# Patient Record
Sex: MALE | Race: BLACK OR AFRICAN AMERICAN | NOT HISPANIC OR LATINO | Employment: FULL TIME | URBAN - METROPOLITAN AREA
[De-identification: names, ages, dates, MRNs, and addresses within clinical notes are randomized per-mention and may not be internally consistent; named-entity substitution may affect disease eponyms.]

---

## 2017-02-14 ENCOUNTER — TRANSFERRED RECORDS (OUTPATIENT)
Dept: HEALTH INFORMATION MANAGEMENT | Facility: CLINIC | Age: 57
End: 2017-02-14

## 2017-02-27 ENCOUNTER — TRANSFERRED RECORDS (OUTPATIENT)
Dept: HEALTH INFORMATION MANAGEMENT | Facility: CLINIC | Age: 57
End: 2017-02-27

## 2017-02-27 ENCOUNTER — MEDICAL CORRESPONDENCE (OUTPATIENT)
Dept: HEALTH INFORMATION MANAGEMENT | Facility: CLINIC | Age: 57
End: 2017-02-27

## 2017-02-28 ENCOUNTER — PRE VISIT (OUTPATIENT)
Dept: ORTHOPEDICS | Facility: CLINIC | Age: 57
End: 2017-02-28

## 2017-02-28 ASSESSMENT — ENCOUNTER SYMPTOMS
MUSCLE WEAKNESS: 0
JOINT SWELLING: 0
STIFFNESS: 0
MYALGIAS: 0
MUSCLE CRAMPS: 0
BACK PAIN: 1
ARTHRALGIAS: 1
NECK PAIN: 0

## 2017-02-28 NOTE — TELEPHONE ENCOUNTER
1.  Date/reason for appt: 3/7/17 left ankle chip fracture   2.  Referring provider: CAIO HUERTA    3.  Call to patient (Yes / No - short description): no, referral   4.  Previous care at / records requested from: [facility]   5.  Other: Faxed requests.

## 2017-03-06 ENCOUNTER — MEDICAL CORRESPONDENCE (OUTPATIENT)
Dept: HEALTH INFORMATION MANAGEMENT | Facility: CLINIC | Age: 57
End: 2017-03-06

## 2017-03-07 ENCOUNTER — OFFICE VISIT (OUTPATIENT)
Dept: ORTHOPEDICS | Facility: CLINIC | Age: 57
End: 2017-03-07

## 2017-03-07 VITALS — WEIGHT: 214.6 LBS | BODY MASS INDEX: 29.07 KG/M2 | HEIGHT: 72 IN

## 2017-03-07 DIAGNOSIS — M19.072 ARTHRITIS OF LEFT ANKLE: Primary | ICD-10-CM

## 2017-03-07 NOTE — MR AVS SNAPSHOT
After Visit Summary   3/7/2017    Anderson Mitchell    MRN: [id number]           Patient Information     Date Of Birth          1960        Visit Information        Provider Department      3/7/2017 10:50 AM Ayush Hamilton MD [facility]        Today's Diagnoses     Arthritis of left ankle    -  1       Follow-ups after your visit        Your next 10 appointments already scheduled     Apr 04, 2017  8:30 AM CDT   XR INJECTION with UCXR3,  IMAGING NURSE, [facility] RAD   [facility] ([facility])    [address] [address] [phone]           Stop drinking 1 hour before the exam.  You may take your medicines as usual, except for blood thinners (Coumadin, Plavix, Ticlid, Persantine, Aggrenox, Pletal, Effient, Brilliant). Talk to your doctor if you take these.  Tell your doctor if:   You have ever had an allergic reaction to X-ray dye (contrast fluid).   There is a chance you may be pregnant.  Please bring a list of your current medicines to your exam. Include vitamins, minerals and over-the-counter medicines.  Please call the Imaging Department at your exam site with any questions.              Future tests that were ordered for you today     Open Future Orders        Priority Expected Expires Ordered    XR Joint Injection Intermed Left Routine 3/7/2017 3/7/2018 3/7/2017            Who to contact     Please call your clinic at [phone] to:    Ask questions about your health    Make or cancel appointments    Discuss your medicines    Learn about your test results    Speak to your doctor   If you have compliments or concerns about an experience at your clinic, or if you wish to file a complaint, please contact [facility] Patient Relations at [phone] or email us at [email or web address]         Additional Information About Your Visit        MyChart  "Information     [employer] gives you secure access to your electronic health record. If you see a primary care provider, you can also send messages to your care team and make appointments. If you have questions, please call your primary care clinic.  If you do not have a primary care provider, please call [phone] and they will assist you.      Zen99 is an electronic gateway that provides easy, online access to your medical records. With [employer], you can request a clinic appointment, read your test results, renew a prescription or communicate with your care team.     To access your existing account, please contact your [facility] or call [phone] for assistance.        Care EveryWhere ID     This is your Care EveryWhere ID. This could be used by other organizations to access your [place] medical records  [id number]        Your Vitals Were     Height BMI (Body Mass Index)                1.84 m (6' 0.44\") 28.75 kg/m2           Blood Pressure from Last 3 Encounters:   05/13/14 (!) 133/96   05/07/13 (!) 142/91   05/08/12 143/85    Weight from Last 3 Encounters:   03/07/17 97.3 kg (214 lb 9.6 oz)   05/13/14 102.5 kg (226 lb)   05/23/13 104.1 kg (229 lb 6.4 oz)               Primary Care Provider    Duane M Koons       No address on file        Thank you!     Thank you for choosing [facility]  for your care. Our goal is always to provide you with excellent care. Hearing back from our patients is one way we can continue to improve our services. Please take a few minutes to complete the written survey that you may receive in the mail after your visit with us. Thank you!             Your Updated Medication List - Protect others around you: Learn how to safely use, store and throw away your medicines at www.disposemymeds.org.          This list is accurate as of: 3/7/17 11:05 AM.  Always use your most recent med list.                   Brand Name Dispense Instructions " for use    ACIPHEX 20 MG EC tablet   Generic drug:  RABEprazole      Take 3 tablets by mouth daily.       FEXOFENADINE HCL PO      Take 1 tablet by mouth daily.       FLONASE 50 MCG/ACT spray   Generic drug:  fluticasone      2 sprays by Both Nostrils route daily.       hydrochlorothiazide 25 MG tablet    HYDRODIURIL     Take 1 tablet by mouth daily.       multivitamin per tablet      Take 1 tablet by mouth daily.       VERAPAMIL HCL PO

## 2017-03-07 NOTE — LETTER
3/7/2017       RE: Anderson Mitchell  [address]  [place] [address]     Dear Colleague,    Thank you for referring your patient, Anderson Mitchell, to the [facility] at [facility]. Please see a copy of my visit note below.    CHIEF COMPLAINT:  Left foot and ankle pain secondary to an injury sustained in 2016.      HISTORY OF PRESENT ILLNESS:  Mr. Mitchell is 56-year-old man who presents today for evaluation of his left ankle area.  The patient reports to have stepped off a surface while being at work.  The patient did not think much of it.  He did not give it any serious importance until later on when he was evaluated by a  and he was told it was just an ankle sprain.      Since then, the patient reports to have problems and difficulties with the left ankle.  Reports to have problems, especially with walking on uneven terrain.  The patient has not had any formal treatment for this.      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      The patient reports also to be very heavily involved in physical activities and to have been working on his weight control and reports to have lost 28 pounds in the past year as a way to try to avoid taking any blood pressure medications.      PAST MEDICAL HISTORY:  Hypertension, history of thalassemia.      PAST SURGICAL HISTORY:  Colonoscopy in 2012 as well as  abdominal surgery.      DRUG ALLERGIES:  None.      MEDICATIONS:  Please refer to encounter form.      PHYSICAL EXAMINATION:  On today's visit, he presents as a pleasant male in no apparent distress with a height of 6 feet and a weight of 214 pounds.  Denies to have any constitutional symptoms.      On today's visit, he presents with full range of motion of the left ankle, hindfoot and midfoot joints.  However, there is some stiffness  for inversion and eversion.  This is somewhat painful to him, especially along the sinus tarsi.  The posterior process of the subtalar joint is also painful.      The anteromedial aspect of the ankle joint is quite painful to the touch.  There is no effusion.  Forefoot exam is grossly unremarkable.      IMAGING:  CT scan from early 2017 was reviewed today which was significant for showing a fair amount of osteoarthritis across the subtalar joint with an ossicle located along the lateral gutter of the subtalar and ankle joint.  He presents with overall a very healthy ankle joint except for the presence of some osteophytes located along the most anteromedial portion.      ASSESSMENT:     1.  Left ankle impingement.   2.  Left subtalar joint osteoarthritis.      PLAN:  I discussed with the patient the natural history of his condition.  For the time being, given the fact that he has not tried any type of treatment, we are going to recommend to proceed with the use of a corticosteroid injection into his subtalar joint.  If this is not successful in improving his discomfort, we can always consider the possibility of a subtalar joint arthrodesis with lateral gutter debridement.      I discussed with him the most likely postoperative course and complications from such intervention.      All questions were answered.  Patient was pleased with the discussion.  The patient would like to proceed with the nonsurgical route for the time being and he will contact us if any problems arise.      All questions were answered.      TT 30 minutes, CT 20 minutes.           Again, thank you for allowing me to participate in the care of your patient.      Sincerely,    Ayush Hamilton MD

## 2017-03-07 NOTE — PROGRESS NOTES
CHIEF COMPLAINT:  Left foot and ankle pain secondary to an injury sustained in 2016.      HISTORY OF PRESENT ILLNESS:  Mr. Mitchell is 56-year-old man who presents today for evaluation of his left ankle area.  The patient reports to have stepped off a surface while being at work.  The patient did not think much of it.  He did not give it any serious importance until later on when he was evaluated by a  and he was told it was just an ankle sprain.      Since then, the patient reports to have problems and difficulties with the left ankle.  Reports to have problems, especially with walking on uneven terrain.  The patient has not had any formal treatment for this.      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      The patient reports also to be very heavily involved in physical activities and to have been working on his weight control and reports to have lost 28 pounds in the past year as a way to try to avoid taking any blood pressure medications.      PAST MEDICAL HISTORY:  Hypertension, history of thalassemia.      PAST SURGICAL HISTORY:  Colonoscopy in 2012 as well as  abdominal surgery.      DRUG ALLERGIES:  None.      MEDICATIONS:  Please refer to encounter form.      PHYSICAL EXAMINATION:  On today's visit, he presents as a pleasant male in no apparent distress with a height of 6 feet and a weight of 214 pounds.  Denies to have any constitutional symptoms.      On today's visit, he presents with full range of motion of the left ankle, hindfoot and midfoot joints.  However, there is some stiffness for inversion and eversion.  This is somewhat painful to him, especially along the sinus tarsi.  The posterior process of the subtalar joint is also painful.      The anteromedial aspect of the ankle joint is quite painful to the touch.  There is no effusion.  Forefoot exam is grossly unremarkable.      IMAGING:   CT scan from early 2017 was reviewed today which was significant for showing a fair amount of osteoarthritis across the subtalar joint with an ossicle located along the lateral gutter of the subtalar and ankle joint.  He presents with overall a very healthy ankle joint except for the presence of some osteophytes located along the most anteromedial portion.      ASSESSMENT:     1.  Left ankle impingement.   2.  Left subtalar joint osteoarthritis.      PLAN:  I discussed with the patient the natural history of his condition.  For the time being, given the fact that he has not tried any type of treatment, we are going to recommend to proceed with the use of a corticosteroid injection into his subtalar joint.  If this is not successful in improving his discomfort, we can always consider the possibility of a subtalar joint arthrodesis with lateral gutter debridement.      I discussed with him the most likely postoperative course and complications from such intervention.      All questions were answered.  Patient was pleased with the discussion.  The patient would like to proceed with the nonsurgical route for the time being and he will contact us if any problems arise.      All questions were answered.      TT 30 minutes, CT 20 minutes.       Answers for HPI/ROS submitted by the patient on 2/28/2017   General Symptoms: Yes  Skin Symptoms: No  HENT Symptoms: No  EYE SYMPTOMS: No  HEART SYMPTOMS: No  LUNG SYMPTOMS: No  INTESTINAL SYMPTOMS: No  URINARY SYMPTOMS: No  REPRODUCTIVE SYMPTOMS: No  SKELETAL SYMPTOMS: Yes  BLOOD SYMPTOMS: No  NERVOUS SYSTEM SYMPTOMS: No  MENTAL HEALTH SYMPTOMS: No  Back pain: Yes  Muscle aches: No  Neck pain: No  Swollen joints: No  Joint pain: Yes  Bone pain: Yes  Muscle cramps: No  Muscle weakness: No  Joint stiffness: No  Bone fracture: Yes

## 2017-12-13 DIAGNOSIS — M19.072 ARTHRITIS OF LEFT ANKLE: Primary | ICD-10-CM

## 2017-12-19 ENCOUNTER — RADIANT APPOINTMENT (OUTPATIENT)
Dept: GENERAL RADIOLOGY | Facility: CLINIC | Age: 57
End: 2017-12-19
Attending: ORTHOPAEDIC SURGERY
Payer: COMMERCIAL

## 2017-12-19 DIAGNOSIS — M19.072 ARTHRITIS OF LEFT ANKLE: ICD-10-CM

## 2017-12-19 RX ORDER — BUPIVACAINE HYDROCHLORIDE 2.5 MG/ML
10 INJECTION, SOLUTION EPIDURAL; INFILTRATION; INTRACAUDAL ONCE
Status: COMPLETED | OUTPATIENT
Start: 2017-12-19 | End: 2017-12-19

## 2017-12-19 RX ORDER — IOPAMIDOL 408 MG/ML
10 INJECTION, SOLUTION INTRATHECAL ONCE
Status: COMPLETED | OUTPATIENT
Start: 2017-12-19 | End: 2017-12-19

## 2017-12-19 RX ORDER — TRIAMCINOLONE ACETONIDE 40 MG/ML
40 INJECTION, SUSPENSION INTRA-ARTICULAR; INTRAMUSCULAR ONCE
Status: COMPLETED | OUTPATIENT
Start: 2017-12-19 | End: 2017-12-19

## 2017-12-19 RX ORDER — LIDOCAINE HYDROCHLORIDE 10 MG/ML
30 INJECTION, SOLUTION EPIDURAL; INFILTRATION; INTRACAUDAL; PERINEURAL ONCE
Status: COMPLETED | OUTPATIENT
Start: 2017-12-19 | End: 2017-12-19

## 2017-12-19 RX ADMIN — TRIAMCINOLONE ACETONIDE 40 MG: 40 INJECTION, SUSPENSION INTRA-ARTICULAR; INTRAMUSCULAR at 13:21

## 2017-12-19 RX ADMIN — IOPAMIDOL 10 ML: 408 INJECTION, SOLUTION INTRATHECAL at 13:21

## 2017-12-19 RX ADMIN — LIDOCAINE HYDROCHLORIDE 50 MG: 10 INJECTION, SOLUTION EPIDURAL; INFILTRATION; INTRACAUDAL; PERINEURAL at 13:21

## 2017-12-19 RX ADMIN — BUPIVACAINE HYDROCHLORIDE 10 MG: 2.5 INJECTION, SOLUTION EPIDURAL; INFILTRATION; INTRACAUDAL at 13:21

## 2018-04-04 ENCOUNTER — MYC MEDICAL ADVICE (OUTPATIENT)
Dept: ORTHOPEDICS | Facility: CLINIC | Age: 58
End: 2018-04-04

## 2018-04-04 DIAGNOSIS — M25.572 PAIN IN JOINT INVOLVING ANKLE AND FOOT, LEFT: Primary | ICD-10-CM

## 2018-04-04 NOTE — TELEPHONE ENCOUNTER
Order for injection was placed. Patient was sent message informing him of this, and the number to schedule was given.

## 2018-04-17 ENCOUNTER — RADIANT APPOINTMENT (OUTPATIENT)
Dept: GENERAL RADIOLOGY | Facility: CLINIC | Age: 58
End: 2018-04-17
Attending: ORTHOPAEDIC SURGERY
Payer: COMMERCIAL

## 2018-04-17 DIAGNOSIS — M25.572 PAIN IN JOINT INVOLVING ANKLE AND FOOT, LEFT: ICD-10-CM

## 2018-04-17 RX ORDER — LIDOCAINE HYDROCHLORIDE 10 MG/ML
5 INJECTION, SOLUTION EPIDURAL; INFILTRATION; INTRACAUDAL; PERINEURAL ONCE
Status: COMPLETED | OUTPATIENT
Start: 2018-04-17 | End: 2018-04-17

## 2018-04-17 RX ORDER — BUPIVACAINE HYDROCHLORIDE 2.5 MG/ML
25 INJECTION, SOLUTION EPIDURAL; INFILTRATION; INTRACAUDAL ONCE
Status: COMPLETED | OUTPATIENT
Start: 2018-04-17 | End: 2018-04-17

## 2018-04-17 RX ORDER — TRIAMCINOLONE ACETONIDE 40 MG/ML
40 INJECTION, SUSPENSION INTRA-ARTICULAR; INTRAMUSCULAR ONCE
Status: COMPLETED | OUTPATIENT
Start: 2018-04-17 | End: 2018-04-17

## 2018-04-17 RX ORDER — IOPAMIDOL 408 MG/ML
20 INJECTION, SOLUTION INTRATHECAL ONCE
Status: COMPLETED | OUTPATIENT
Start: 2018-04-17 | End: 2018-04-17

## 2018-04-17 RX ADMIN — LIDOCAINE HYDROCHLORIDE 2 ML: 10 INJECTION, SOLUTION EPIDURAL; INFILTRATION; INTRACAUDAL; PERINEURAL at 09:13

## 2018-04-17 RX ADMIN — BUPIVACAINE HYDROCHLORIDE 4 ML: 2.5 INJECTION, SOLUTION EPIDURAL; INFILTRATION; INTRACAUDAL at 09:13

## 2018-04-17 RX ADMIN — TRIAMCINOLONE ACETONIDE 1 ML: 40 INJECTION, SUSPENSION INTRA-ARTICULAR; INTRAMUSCULAR at 09:13

## 2018-04-17 RX ADMIN — IOPAMIDOL 1 ML: 408 INJECTION, SOLUTION INTRATHECAL at 09:13

## 2019-03-05 DIAGNOSIS — M25.572 PAIN IN JOINT INVOLVING ANKLE AND FOOT, LEFT: Primary | ICD-10-CM

## 2019-03-26 ENCOUNTER — ANCILLARY PROCEDURE (OUTPATIENT)
Dept: GENERAL RADIOLOGY | Facility: CLINIC | Age: 59
End: 2019-03-26
Attending: ORTHOPAEDIC SURGERY
Payer: COMMERCIAL

## 2019-03-26 DIAGNOSIS — M25.572 PAIN IN JOINT INVOLVING ANKLE AND FOOT, LEFT: ICD-10-CM

## 2019-03-26 RX ORDER — IOPAMIDOL 408 MG/ML
20 INJECTION, SOLUTION INTRATHECAL ONCE
Status: COMPLETED | OUTPATIENT
Start: 2019-03-26 | End: 2019-03-26

## 2019-03-26 RX ORDER — LIDOCAINE HYDROCHLORIDE 10 MG/ML
30 INJECTION, SOLUTION EPIDURAL; INFILTRATION; INTRACAUDAL; PERINEURAL ONCE
Status: COMPLETED | OUTPATIENT
Start: 2019-03-26 | End: 2019-03-26

## 2019-03-26 RX ORDER — TRIAMCINOLONE ACETONIDE 40 MG/ML
40 INJECTION, SUSPENSION INTRA-ARTICULAR; INTRAMUSCULAR ONCE
Status: COMPLETED | OUTPATIENT
Start: 2019-03-26 | End: 2019-03-26

## 2019-03-26 RX ORDER — BUPIVACAINE HYDROCHLORIDE 2.5 MG/ML
10 INJECTION, SOLUTION EPIDURAL; INFILTRATION; INTRACAUDAL ONCE
Status: COMPLETED | OUTPATIENT
Start: 2019-03-26 | End: 2019-03-26

## 2019-03-26 RX ADMIN — TRIAMCINOLONE ACETONIDE 40 MG: 40 INJECTION, SUSPENSION INTRA-ARTICULAR; INTRAMUSCULAR at 08:12

## 2019-03-26 RX ADMIN — LIDOCAINE HYDROCHLORIDE 5 ML: 10 INJECTION, SOLUTION EPIDURAL; INFILTRATION; INTRACAUDAL; PERINEURAL at 08:11

## 2019-03-26 RX ADMIN — BUPIVACAINE HYDROCHLORIDE 10 MG: 2.5 INJECTION, SOLUTION EPIDURAL; INFILTRATION; INTRACAUDAL at 08:12

## 2019-03-26 RX ADMIN — IOPAMIDOL 2 ML: 408 INJECTION, SOLUTION INTRATHECAL at 08:12

## 2019-09-30 ENCOUNTER — HEALTH MAINTENANCE LETTER (OUTPATIENT)
Age: 59
End: 2019-09-30

## 2019-12-31 ENCOUNTER — TRANSFERRED RECORDS (OUTPATIENT)
Dept: HEALTH INFORMATION MANAGEMENT | Facility: CLINIC | Age: 59
End: 2019-12-31

## 2019-12-31 ENCOUNTER — MEDICAL CORRESPONDENCE (OUTPATIENT)
Dept: HEALTH INFORMATION MANAGEMENT | Facility: CLINIC | Age: 59
End: 2019-12-31

## 2020-01-01 ENCOUNTER — TRANSFERRED RECORDS (OUTPATIENT)
Dept: HEALTH INFORMATION MANAGEMENT | Facility: CLINIC | Age: 60
End: 2020-01-01

## 2020-01-02 ENCOUNTER — TRANSFERRED RECORDS (OUTPATIENT)
Dept: HEALTH INFORMATION MANAGEMENT | Facility: CLINIC | Age: 60
End: 2020-01-02

## 2020-01-02 NOTE — TELEPHONE ENCOUNTER
RECORDS RECEIVED FROM: [facility]- Dr. Rico Adams   DATE RECEIVED: 1/21/2020   NOTES STATUS DETAILS   OFFICE NOTE from referring provider Received/ In process *1/2 Fax request sent to [facility] ([phone]) for referral and office notes. -Bhao     12/31/19 Referral    OFFICE NOTE from other specialist Received- 1/10/2020 *1/9/2020 Fax request sent to [facility] ([phone]) for recs. -Bhao    2/4/16 Office visit with Dr. Arben Sauer ([facility])   DISCHARGE SUMMARY from hospital N/A    OPERATIVE REPORT N/A    MEDICATION LIST Internal         ENDOSCOPY  Received EGD: 3/25/16 ([facility])   COLONOSCOPY N/A    ERCP N/A    EUS N/A    STOOL TESTING N/A    PERTINENT LABS Internal/ Received    PATHOLOGY REPORTS (RELATED) N/A    IMAGING (CT, MRI, EGD) Internal CT Abdomen Pelvis: 2/9/16     REFERRAL INFORMATION    Date referral was placed: 1/21/2020   Date all records received: N/A   Date records were scanned into Epic: N/A   Date records were sent to Provider to review: N/A   Date and recommendation received from provider:  LETTER SENT  SCHEDULE APPOINTMENT   Date patient was contacted to schedule: 1/2/2020     Action 1/9/2020 -Bhao   Action Taken Fax request sent to [facility] ([phone]) for office notes.     10:38am Received a call from [facility] HIM department; there are no office notes for this Pt- just the referral and labs that has been faxed over and received.     10:41am Called and LVM for Pt to see if there are any outside med recs.     3:25pm Pt called and noted that he has been seen at [facility] back in 2016. Will send a fax request to [facility] for recs. -Bhao      Action 1/10/2020 7:59am -Bhao   Action Taken Received office notes from [facility]; sent to scan for uploading.    8:15am Called and spoke with Pt; updated Pt that recs from [facility] has been received.

## 2020-01-06 ENCOUNTER — DOCUMENTATION ONLY (OUTPATIENT)
Dept: CARE COORDINATION | Facility: CLINIC | Age: 60
End: 2020-01-06

## 2020-01-06 ENCOUNTER — TRANSCRIBE ORDERS (OUTPATIENT)
Dept: OTHER | Age: 60
End: 2020-01-06

## 2020-01-06 DIAGNOSIS — R10.13 EPIGASTRIC PAIN: Primary | ICD-10-CM

## 2020-01-07 ASSESSMENT — ENCOUNTER SYMPTOMS
CHILLS: 0
BLOOD IN STOOL: 0
ALTERED TEMPERATURE REGULATION: 0
MYALGIAS: 1
BOWEL INCONTINENCE: 0
BACK PAIN: 1
NAUSEA: 0
RECTAL PAIN: 0
CONSTIPATION: 1
ABDOMINAL PAIN: 1
POLYDIPSIA: 0
DEPRESSION: 0
STIFFNESS: 0
WEIGHT LOSS: 1
DIARRHEA: 1
ARTHRALGIAS: 1
HEARTBURN: 1
MUSCLE WEAKNESS: 0
JAUNDICE: 0
HALLUCINATIONS: 0
WEIGHT GAIN: 0
INCREASED ENERGY: 0
PANIC: 1
FATIGUE: 0
FEVER: 0
NECK PAIN: 0
DECREASED CONCENTRATION: 0
NERVOUS/ANXIOUS: 1
POLYPHAGIA: 0
BLOATING: 1
JOINT SWELLING: 0
NIGHT SWEATS: 1
VOMITING: 0
MUSCLE CRAMPS: 0
INSOMNIA: 1
DECREASED APPETITE: 0

## 2020-01-20 ENCOUNTER — TELEPHONE (OUTPATIENT)
Dept: GASTROENTEROLOGY | Facility: CLINIC | Age: 60
End: 2020-01-20

## 2020-01-20 NOTE — TELEPHONE ENCOUNTER
Spoke to patient reminding of appointment scheduled on 1/21/20 at 320pm with [facility] clinic. Patient to arrive 15 min early. To reschedule or cancel patient to call [phone].      BENITA Pacheco

## 2020-01-21 ENCOUNTER — PRE VISIT (OUTPATIENT)
Dept: GASTROENTEROLOGY | Facility: CLINIC | Age: 60
End: 2020-01-21

## 2020-01-21 ENCOUNTER — OFFICE VISIT (OUTPATIENT)
Dept: GASTROENTEROLOGY | Facility: CLINIC | Age: 60
End: 2020-01-21
Payer: COMMERCIAL

## 2020-01-21 VITALS
OXYGEN SATURATION: 100 % | DIASTOLIC BLOOD PRESSURE: 81 MMHG | SYSTOLIC BLOOD PRESSURE: 141 MMHG | HEIGHT: 73 IN | WEIGHT: 209.5 LBS | TEMPERATURE: 98.6 F | HEART RATE: 58 BPM | BODY MASS INDEX: 27.77 KG/M2

## 2020-01-21 DIAGNOSIS — K21.9 GASTROESOPHAGEAL REFLUX DISEASE, ESOPHAGITIS PRESENCE NOT SPECIFIED: Primary | ICD-10-CM

## 2020-01-21 DIAGNOSIS — Z85.09 H/O MALIGNANT GASTROINTESTINAL STROMAL TUMOR (GIST): ICD-10-CM

## 2020-01-21 ASSESSMENT — MIFFLIN-ST. JEOR: SCORE: 1829.17

## 2020-01-21 ASSESSMENT — PAIN SCALES - GENERAL: PAINLEVEL: NO PAIN (0)

## 2020-01-21 NOTE — LETTER
1/21/2020       RE: Anderson Mitchell  [address]  [facility] [address]     Dear Colleague,    Thank you for referring your patient, Anderson Mitchell, to the [facility] GASTROENTEROLOGY AND IBD CLINIC at [facility]. Please see a copy of my visit note below.    GI CLINIC VISIT    CC/REFERRING MD:  Rico Adams    REASON FOR CONSULTATION:   The pt is a 59 year old male who I was asked to see in consultation at the request of Dr. Rico Adams for heartburn.    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    Symptoms of typical heartburn around time of new job, may have been GERD brought on by stress, change in diet, etc. Now nearly resolved while on rabeprazole. Unlikely to represent angina, cardiac chest pain given his ability to run 4 miles without symptoms. Less likely to represent recurrence of GIST- tumor is relatively low risk (typical cutoffs for risk are 5cm and 5 mitoses, so his tumor was not lowest risk).     Favor diagnostic EGD if still symptomatic from heartburn standpoint.    Favoring additional CT AP, although this might be excessive- oncology stated no further follow up needed in 2013 and CT scan and EGD in 2016 still unremarkable. Can be discussed at follow up appointments (EGD or with primary care).    1. Schedule EGD in 4-8 weeks. You could cancel if you are feeling back to normal.   2. Decrease rabeprazole to one tablet once per day for 1 week, then one tablet every other day for 1 week, then stop it. If still symptomatic from heartburn, ok to take once to twice per day.   3. We will discuss ordering a CT scan at your EGD appointment.     RTC 6 months    Thank you for this consultation.  It was a pleasure to participate in the care of this patient; please contact us with any further  questions.     Tejas Bradley MD    Division of Gastroenterology, Hepatology and Nutrition  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    Heartburn started when he started a new job November 25th. Described as burning, congestion, tightness at level of sternum. Now totally resolved, he thinks it may be due to improvement in stress at work. No other issues with food or liquids. He was having a lot of liquid into mouth- thought due to nasal drainage but may have some from esophagus. Also resolved. Currently feels 85% back to normal. Able to run 4 miles on Sunday on the treadmill without chest pain, shortness of breath.    Now taking rabeprazole with improvement. Was treating symptoms for 1 year with supplements- gastrazyme, hydro-zyme.     Non smoker. Plays baseball in summer.     Notes worsening symptoms if drinking alcohol.    Working at [employer].     Resected gastric GIST was 6cm with 7 mitoses out of 50hpf. He followed with hematology until 2013.     ROS:    No fevers or chills  No weight loss  No blurry vision, double vision or change in vision  No sore throat  No lymphadenopathy  No headache, paraesthesias, or weakness in a limb  No shortness of breath or wheezing  No chest pain or pressure  No arthralgias or myalgias  No rashes or skin changes  No odynophagia or dysphagia  No BRBPR, hematochezia, melena  No dysuria, frequency or urgency  No hot/cold intolerance or polyria  No anxiety or depression    PROBLEM LIST  Patient Active Problem List    Diagnosis Date Noted     Heterozygous thalassemia 05/07/2013     Priority: Medium     Diagnosis updated by automated process. Provider to review and confirm.       Essential hypertension 05/07/2013     Priority: Medium     Problem list name updated by automated process. Provider  to review       Shoulder pain 12/22/2009     Priority: Medium     Gastrointestinal stromal tumor (H) 05/05/2008     Priority: Medium       PERTINENT PAST MEDICAL HISTORY:  Past Medical History:   Diagnosis Date     Gastrointestinal stromal tumor (H) 5/5/2008     Hypertension      Malignant neoplasm (H)     gastrointestinal mass-removed 5-2008     Thalassaemia trait 5/7/2013     PREVIOUS SURGERIES:  Past Surgical History:   Procedure Laterality Date     ABDOMEN SURGERY       COLONOSCOPY  4/6/2012    Procedure:COLONOSCOPY; Surgeon:TAMIE REYNA; Location: GI     ORTHOPEDIC SURGERY       PREVIOUS ENDOSCOPY:  EGD 3/25/16  Normal esophagus. Z line 43cm  Stomach deformity from previous surgery. No GIST.   Normal duodenum.    Colonoscopy 4/6/12  Findings:       The perianal and digital rectal examinations were normal. A few        small-mouthed diverticula were found in the sigmoid colon. The exam was otherwise without abnormality.    ALLERGIES:  Allergies   Allergen Reactions     Nkda [No Known Drug Allergies]      PERTINENT MEDICATIONS:    Current Outpatient Medications:      FEXOFENADINE HCL PO, Take 1 tablet by mouth daily., Disp: , Rfl:      fluticasone (FLONASE) 50 MCG/ACT nasal spray, 2 sprays by Both Nostrils route daily., Disp: , Rfl:      hydrochlorothiazide (HYDRODIURIL) 25 MG tablet, Take 1 tablet by mouth daily., Disp: , Rfl:      Multiple Vitamin (MULTIVITAMIN) per tablet, Take 1 tablet by mouth daily., Disp: , Rfl:      rabeprazole (ACIPHEX) 20 MG tablet, Take 3 tablets by mouth daily., Disp: , Rfl:      VERAPAMIL HCL PO, , Disp: , Rfl:     SOCIAL HISTORY:  Social History     Socioeconomic History     Marital status: Single     Spouse name: Not on file     Number of children: Not on file     Years of education: Not on file     Highest education level: Not on file   Occupational History     Not on file   Social Needs     Financial resource strain: Not on file     Food insecurity:     Worry:  "Not on file     Inability: Not on file     Transportation needs:     Medical: Not on file     Non-medical: Not on file   Tobacco Use     Smoking status: Former Smoker     Types: Cigars     Smokeless tobacco: Never Used     Tobacco comment: OCC. CIGAR   Substance and Sexual Activity     Alcohol use: Yes     Alcohol/week: 0.0 standard drinks     Comment: 1-2 drinks a couple times per week     Drug use: No     Sexual activity: Not on file   Lifestyle     Physical activity:     Days per week: Not on file     Minutes per session: Not on file     Stress: Not on file   Relationships     Social connections:     Talks on phone: Not on file     Gets together: Not on file     Attends Yarsani service: Not on file     Active member of club or organization: Not on file     Attends meetings of clubs or organizations: Not on file     Relationship status: Not on file     Intimate partner violence:     Fear of current or ex partner: Not on file     Emotionally abused: Not on file     Physically abused: Not on file     Forced sexual activity: Not on file   Other Topics Concern     Parent/sibling w/ CABG, MI or angioplasty before 65F 55M? Not Asked   Social History Narrative     Not on file     FAMILY HISTORY:  Family History   Problem Relation Age of Onset     Hypertension Father      Respiratory Brother         YUNG     Respiratory Father         YUNG     Hypertension Brother      Thyroid Disease Mother      Cerebrovascular Disease Father      C.A.D. Father    Mother had stomach cancer and supposedly her parents also.  Past/family/social history reviewed and no changes    PHYSICAL EXAMINATION:  Constitutional: aaox3, cooperative, pleasant, not dyspneic/diaphoretic, no acute distress  Vitals reviewed: BP (!) 141/81   Pulse 58   Temp 98.6  F (37  C) (Oral)   Ht 1.854 m (6' 1\")   Wt 95 kg (209 lb 8 oz)   SpO2 100%   BMI 27.64 kg/m     Wt:   Wt Readings from Last 2 Encounters:   01/21/20 95 kg (209 lb 8 oz)   03/07/17 97.3 kg (214 " lb 9.6 oz)      Eyes: Sclera anicteric/injected  Ears/nose/mouth/throat: Normal oropharynx without ulcers or exudate, mucus membranes moist, hearing intact  Neck: supple, thyroid normal size  CV: No edema  Respiratory: Unlabored breathing  Lymph: No cervical lymphadenopathy  Abd: Nondistended, +bs, no hepatosplenomegaly, nontender, no peritoneal signs  Skin: warm, perfused, no jaundice  Psych: Normal affect  MSK: Normal gait    PERTINENT STUDIES:  Orders Only on 05/07/2013   Component Date Value Ref Range Status     Sodium 05/07/2013 145* 133 - 144 mmol/L Final     Potassium 05/07/2013 3.9  3.4 - 5.3 mmol/L Final     Chloride 05/07/2013 105  94 - 109 mmol/L Final     Carbon Dioxide 05/07/2013 28  20 - 32 mmol/L Final     Anion Gap 05/07/2013 11  6 - 17 mmol/L Final     Glucose 05/07/2013 100* 60 - 99 mg/dL Final     Urea Nitrogen 05/07/2013 16  7 - 30 mg/dL Final     Creatinine 05/07/2013 1.22  0.66 - 1.25 mg/dL Final     GFR Estimate 05/07/2013 63  >60 mL/min/1.7m2 Final     GFR Estimate If Black 05/07/2013 76  >60 mL/min/1.7m2 Final     Calcium 05/07/2013 8.6  8.5 - 10.4 mg/dL Final     Bilirubin Total 05/07/2013 0.6  0.2 - 1.3 mg/dL Final     Albumin 05/07/2013 3.7* 3.9 - 5.1 g/dL Final     Protein Total 05/07/2013 7.1  6.8 - 8.8 g/dL Final     Alkaline Phosphatase 05/07/2013 52  40 - 150 U/L Final     ALT 05/07/2013 47  0 - 70 U/L Final     AST 05/07/2013 45  0 - 45 U/L Final     WBC 05/07/2013 5.3  4.0 - 11.0 10e9/L Final     RBC Count 05/07/2013 5.50  4.4 - 5.9 10e12/L Final     Hemoglobin 05/07/2013 15.5  13.3 - 17.7 g/dL Final     Hematocrit 05/07/2013 42.3  40.0 - 53.0 % Final     MCV 05/07/2013 77* 78 - 100 fl Final     MCH 05/07/2013 28.2  26.5 - 33.0 pg Final     MCHC 05/07/2013 36.6* 31.5 - 36.5 g/dL Final     RDW 05/07/2013 14.4  10.0 - 15.0 % Final     Platelet Count 05/07/2013 205  150 - 450 10e9/L Final     Diff Method 05/07/2013 Automated Method   Final     % Neutrophils 05/07/2013 50.1  40 - 75 %  Final     % Lymphocytes 05/07/2013 36.4  20 - 48 % Final     % Monocytes 05/07/2013 9.5  0 - 12 % Final     % Eosinophils 05/07/2013 2.7  0 - 6 % Final     % Basophils 05/07/2013 1.1  0 - 2 % Final     % Immature Granulocytes 05/07/2013 0.2  0 - 0.4 % Final     Absolute Neutrophil 05/07/2013 2.6  1.6 - 8.3 10e9/L Final     Absolute Lymphocytes 05/07/2013 1.9  0.8 - 5.3 10e9/L Final     Absolute Monocytes 05/07/2013 0.5  0.0 - 1.3 10e9/L Final     Absolute Eosinophils 05/07/2013 0.1  0.0 - 0.7 10e9/L Final     Absolute Basophils 05/07/2013 0.1  0.0 - 0.2 10e9/L Final     Abs Immature Granulocytes 05/07/2013 0.0  0 - 0.03 10e9/L Final     CT AP 2/9/16  IMPRESSION:   1. No acute findings in the abdomen or pelvis.  2. Stable changes of partial gastrectomy.  3. Sigmoid diverticulosis without diverticulitis.    Answers for HPI/ROS submitted by the patient on 1/7/2020   General Symptoms: Yes  Skin Symptoms: No  HENT Symptoms: No  EYE SYMPTOMS: No  HEART SYMPTOMS: No  LUNG SYMPTOMS: No  INTESTINAL SYMPTOMS: Yes  URINARY SYMPTOMS: No  REPRODUCTIVE SYMPTOMS: Yes  SKELETAL SYMPTOMS: Yes  BLOOD SYMPTOMS: No  NERVOUS SYSTEM SYMPTOMS: No  MENTAL HEALTH SYMPTOMS: Yes  Fever: No  Loss of appetite: No  Weight loss: Yes  Weight gain: No  Fatigue: No  Night sweats: Yes  Chills: No  Increased stress: Yes  Excessive hunger: No  Excessive thirst: No  Feeling hot or cold when others believe the temperature is normal: No  Loss of height: No  Post-operative complications: No  Surgical site pain: No  Hallucinations: No  Change in or Loss of Energy: No  Hyperactivity: Yes  Confusion: No  Heart burn or indigestion: Yes  Nausea: No  Vomiting: No  Abdominal pain: Yes  Bloating: Yes  Constipation: Yes  Diarrhea: Yes  Blood in stool: No  Black stools: Yes  Rectal or Anal pain: No  Fecal incontinence: No  Yellowing of skin or eyes: No  Vomit with blood: No  Change in stools: Yes  Back pain: Yes  Muscle aches: Yes  Neck pain: No  Swollen joints:  No  Joint pain: Yes  Bone pain: No  Muscle cramps: No  Muscle weakness: No  Joint stiffness: No  Bone fracture: No  Scrotal pain or swelling: No  Erectile dysfunction: Yes  Penile discharge: No  Genital ulcers: No  Reduced libido: No  Nervous or Anxious: Yes  Depression: No  Trouble sleeping: Yes  Trouble thinking or concentrating: No  Mood changes: No  Panic attacks: Yes    Tejas Bradley MD

## 2020-01-21 NOTE — PATIENT INSTRUCTIONS
It was a pleasure taking care of you today.  I've included a brief summary of our discussion and care plan from today's visit below.  Please review this information with your primary care provider.  _______________________________________________________________________    My recommendations are summarized as follows:  1. Schedule EGD in 4-8 weeks. You could cancel if you are feeling back to normal.   2. Decrease rabeprazole to one tablet once per day for 1 week, then one tablet every other day for 1 week, then stop it. If still symptomatic from heartburn, ok to take once to twice per day.   3. We will discuss ordering a CT scan at your EGD appointment.     Please call our nurse Seema with any questions or concerns- [phone].  --    Return to GI Clinic in 6 months to review your progress.    _______________________________________________________________________    Who do I call with any questions after my visit?  Please be in touch if there are any further questions that arise following today's visit.  There are multiple ways to contact your gastroenterology care team.        During business hours, you may reach a Gastroenterology nurse at [phone] and choose option 3.         To schedule or reschedule an appointment, please call [phone].       You can always send a secure message through [employer].  [employer] messages are answered by your nurse or doctor typically within 24 hours.  Please allow extra time on weekends and holidays.        For urgent/emergent questions after business hours, you may reach the on-call GI Fellow by contacting the [facility] at [phone].     How will I get the results of any tests ordered?    You will receive all of your results.  If you have signed up for [employer], any tests ordered at your visit will be available to you after your physician reviews them.  Typically this takes 1-2 weeks.  If there are urgent results that require a change in your care  plan, your physician or nurse will call you to discuss the next steps.      What is [employer]?  [employer] is a secure way for you to access all of your healthcare records from the [facility].  It is a web based computer program, so you can sign on to it from any location.  It also allows you to send secure messages to your care team.  I recommend signing up for [employer] access if you have not already done so and are comfortable with using a computer.      How to I schedule a follow-up visit?  If you did not schedule a follow-up visit today, please call [phone] to schedule a follow-up office visit.        Sincerely,    Tejas Bradley MD     [facility]  Division of Gastroenterology

## 2020-01-21 NOTE — PROGRESS NOTES
GI CLINIC VISIT    CC/REFERRING MD:  Rico Adams    REASON FOR CONSULTATION:   The pt is a 59 year old male who I was asked to see in consultation at the request of Dr. Rico Adams for heartburn.    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    Symptoms of typical heartburn around time of new job, may have been GERD brought on by stress, change in diet, etc. Now nearly resolved while on rabeprazole. Unlikely to represent angina, cardiac chest pain given his ability to run 4 miles without symptoms. Less likely to represent recurrence of GIST- tumor is relatively low risk (typical cutoffs for risk are 5cm and 5 mitoses, so his tumor was not lowest risk).     Favor diagnostic EGD if still symptomatic from heartburn standpoint.    Favoring additional CT AP, although this might be excessive- oncology stated no further follow up needed in 2013 and CT scan and EGD in 2016 still unremarkable. Can be discussed at follow up appointments (EGD or with primary care).    1. Schedule EGD in 4-8 weeks. You could cancel if you are feeling back to normal.   2. Decrease rabeprazole to one tablet once per day for 1 week, then one tablet every other day for 1 week, then stop it. If still symptomatic from heartburn, ok to take once to twice per day.   3. We will discuss ordering a CT scan at your EGD appointment.     RTC 6 months    Thank you for this consultation.  It was a pleasure to participate in the care of this patient; please contact us with any further questions.     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    Heartburn started when he started a new job November 25th. Described as burning, congestion, tightness at level of sternum. Now totally resolved, he thinks it may be due to improvement in stress at work. No other issues with food or liquids. He was having a lot of liquid into mouth- thought due to nasal drainage but may have some from esophagus. Also resolved. Currently feels 85% back to normal. Able to run 4 miles on Sunday on the treadmill without chest pain, shortness of breath.    Now taking rabeprazole with improvement. Was treating symptoms for 1 year with supplements- gastrazyme, hydro-zyme.     Non smoker. Plays baseball in summer.     Notes worsening symptoms if drinking alcohol.    Working at [employer].     Resected gastric GIST was 6cm with 7 mitoses out of 50hpf. He followed with hematology until 2013.     ROS:    No fevers or chills  No weight loss  No blurry vision, double vision or change in vision  No sore throat  No lymphadenopathy  No headache, paraesthesias, or weakness in a limb  No shortness of breath or wheezing  No chest pain or pressure  No arthralgias or myalgias  No rashes or skin changes  No odynophagia or dysphagia  No BRBPR, hematochezia, melena  No dysuria, frequency or urgency  No hot/cold intolerance or polyria  No anxiety or depression    PROBLEM LIST  Patient Active Problem List    Diagnosis Date Noted     Heterozygous thalassemia 05/07/2013     Priority: Medium     Diagnosis updated by automated process. Provider to review and confirm.       Essential hypertension 05/07/2013     Priority: Medium     Problem list name updated by automated process. Provider to review       Shoulder pain 12/22/2009     Priority: Medium     Gastrointestinal stromal tumor (H) 05/05/2008     Priority: Medium       PERTINENT PAST MEDICAL HISTORY:  Past Medical History:   Diagnosis Date     Gastrointestinal stromal tumor (H) 5/5/2008     Hypertension      Malignant neoplasm (H)      gastrointestinal mass-removed 5-2008     Thalassaemia trait 5/7/2013     PREVIOUS SURGERIES:  Past Surgical History:   Procedure Laterality Date     ABDOMEN SURGERY       COLONOSCOPY  4/6/2012    Procedure:COLONOSCOPY; Surgeon:TAMIE REYNA; Location: GI     ORTHOPEDIC SURGERY       PREVIOUS ENDOSCOPY:  EGD 3/25/16  Normal esophagus. Z line 43cm  Stomach deformity from previous surgery. No GIST.   Normal duodenum.    Colonoscopy 4/6/12  Findings:       The perianal and digital rectal examinations were normal. A few        small-mouthed diverticula were found in the sigmoid colon. The exam was otherwise without abnormality.    ALLERGIES:  Allergies   Allergen Reactions     Nkda [No Known Drug Allergies]      PERTINENT MEDICATIONS:    Current Outpatient Medications:      FEXOFENADINE HCL PO, Take 1 tablet by mouth daily., Disp: , Rfl:      fluticasone (FLONASE) 50 MCG/ACT nasal spray, 2 sprays by Both Nostrils route daily., Disp: , Rfl:      hydrochlorothiazide (HYDRODIURIL) 25 MG tablet, Take 1 tablet by mouth daily., Disp: , Rfl:      Multiple Vitamin (MULTIVITAMIN) per tablet, Take 1 tablet by mouth daily., Disp: , Rfl:      rabeprazole (ACIPHEX) 20 MG tablet, Take 3 tablets by mouth daily., Disp: , Rfl:      VERAPAMIL HCL PO, , Disp: , Rfl:     SOCIAL HISTORY:  Social History     Socioeconomic History     Marital status: Single     Spouse name: Not on file     Number of children: Not on file     Years of education: Not on file     Highest education level: Not on file   Occupational History     Not on file   Social Needs     Financial resource strain: Not on file     Food insecurity:     Worry: Not on file     Inability: Not on file     Transportation needs:     Medical: Not on file     Non-medical: Not on file   Tobacco Use     Smoking status: Former Smoker     Types: Cigars     Smokeless tobacco: Never Used     Tobacco comment: OCC. CIGAR   Substance and Sexual Activity     Alcohol use: Yes      "Alcohol/week: 0.0 standard drinks     Comment: 1-2 drinks a couple times per week     Drug use: No     Sexual activity: Not on file   Lifestyle     Physical activity:     Days per week: Not on file     Minutes per session: Not on file     Stress: Not on file   Relationships     Social connections:     Talks on phone: Not on file     Gets together: Not on file     Attends Taoism service: Not on file     Active member of club or organization: Not on file     Attends meetings of clubs or organizations: Not on file     Relationship status: Not on file     Intimate partner violence:     Fear of current or ex partner: Not on file     Emotionally abused: Not on file     Physically abused: Not on file     Forced sexual activity: Not on file   Other Topics Concern     Parent/sibling w/ CABG, MI or angioplasty before 65F 55M? Not Asked   Social History Narrative     Not on file     FAMILY HISTORY:  Family History   Problem Relation Age of Onset     Hypertension Father      Respiratory Brother         YUNG     Respiratory Father         YUNG     Hypertension Brother      Thyroid Disease Mother      Cerebrovascular Disease Father      C.A.D. Father    Mother had stomach cancer and supposedly her parents also.  Past/family/social history reviewed and no changes    PHYSICAL EXAMINATION:  Constitutional: aaox3, cooperative, pleasant, not dyspneic/diaphoretic, no acute distress  Vitals reviewed: BP (!) 141/81   Pulse 58   Temp 98.6  F (37  C) (Oral)   Ht 1.854 m (6' 1\")   Wt 95 kg (209 lb 8 oz)   SpO2 100%   BMI 27.64 kg/m    Wt:   Wt Readings from Last 2 Encounters:   01/21/20 95 kg (209 lb 8 oz)   03/07/17 97.3 kg (214 lb 9.6 oz)      Eyes: Sclera anicteric/injected  Ears/nose/mouth/throat: Normal oropharynx without ulcers or exudate, mucus membranes moist, hearing intact  Neck: supple, thyroid normal size  CV: No edema  Respiratory: Unlabored breathing  Lymph: No cervical lymphadenopathy  Abd: Nondistended, +bs, no " hepatosplenomegaly, nontender, no peritoneal signs  Skin: warm, perfused, no jaundice  Psych: Normal affect  MSK: Normal gait    PERTINENT STUDIES:  Orders Only on 05/07/2013   Component Date Value Ref Range Status     Sodium 05/07/2013 145* 133 - 144 mmol/L Final     Potassium 05/07/2013 3.9  3.4 - 5.3 mmol/L Final     Chloride 05/07/2013 105  94 - 109 mmol/L Final     Carbon Dioxide 05/07/2013 28  20 - 32 mmol/L Final     Anion Gap 05/07/2013 11  6 - 17 mmol/L Final     Glucose 05/07/2013 100* 60 - 99 mg/dL Final     Urea Nitrogen 05/07/2013 16  7 - 30 mg/dL Final     Creatinine 05/07/2013 1.22  0.66 - 1.25 mg/dL Final     GFR Estimate 05/07/2013 63  >60 mL/min/1.7m2 Final     GFR Estimate If Black 05/07/2013 76  >60 mL/min/1.7m2 Final     Calcium 05/07/2013 8.6  8.5 - 10.4 mg/dL Final     Bilirubin Total 05/07/2013 0.6  0.2 - 1.3 mg/dL Final     Albumin 05/07/2013 3.7* 3.9 - 5.1 g/dL Final     Protein Total 05/07/2013 7.1  6.8 - 8.8 g/dL Final     Alkaline Phosphatase 05/07/2013 52  40 - 150 U/L Final     ALT 05/07/2013 47  0 - 70 U/L Final     AST 05/07/2013 45  0 - 45 U/L Final     WBC 05/07/2013 5.3  4.0 - 11.0 10e9/L Final     RBC Count 05/07/2013 5.50  4.4 - 5.9 10e12/L Final     Hemoglobin 05/07/2013 15.5  13.3 - 17.7 g/dL Final     Hematocrit 05/07/2013 42.3  40.0 - 53.0 % Final     MCV 05/07/2013 77* 78 - 100 fl Final     MCH 05/07/2013 28.2  26.5 - 33.0 pg Final     MCHC 05/07/2013 36.6* 31.5 - 36.5 g/dL Final     RDW 05/07/2013 14.4  10.0 - 15.0 % Final     Platelet Count 05/07/2013 205  150 - 450 10e9/L Final     Diff Method 05/07/2013 Automated Method   Final     % Neutrophils 05/07/2013 50.1  40 - 75 % Final     % Lymphocytes 05/07/2013 36.4  20 - 48 % Final     % Monocytes 05/07/2013 9.5  0 - 12 % Final     % Eosinophils 05/07/2013 2.7  0 - 6 % Final     % Basophils 05/07/2013 1.1  0 - 2 % Final     % Immature Granulocytes 05/07/2013 0.2  0 - 0.4 % Final     Absolute Neutrophil 05/07/2013 2.6  1.6 -  8.3 10e9/L Final     Absolute Lymphocytes 05/07/2013 1.9  0.8 - 5.3 10e9/L Final     Absolute Monocytes 05/07/2013 0.5  0.0 - 1.3 10e9/L Final     Absolute Eosinophils 05/07/2013 0.1  0.0 - 0.7 10e9/L Final     Absolute Basophils 05/07/2013 0.1  0.0 - 0.2 10e9/L Final     Abs Immature Granulocytes 05/07/2013 0.0  0 - 0.03 10e9/L Final     CT AP 2/9/16  IMPRESSION:   1. No acute findings in the abdomen or pelvis.  2. Stable changes of partial gastrectomy.  3. Sigmoid diverticulosis without diverticulitis.    Answers for HPI/ROS submitted by the patient on 1/7/2020   General Symptoms: Yes  Skin Symptoms: No  HENT Symptoms: No  EYE SYMPTOMS: No  HEART SYMPTOMS: No  LUNG SYMPTOMS: No  INTESTINAL SYMPTOMS: Yes  URINARY SYMPTOMS: No  REPRODUCTIVE SYMPTOMS: Yes  SKELETAL SYMPTOMS: Yes  BLOOD SYMPTOMS: No  NERVOUS SYSTEM SYMPTOMS: No  MENTAL HEALTH SYMPTOMS: Yes  Fever: No  Loss of appetite: No  Weight loss: Yes  Weight gain: No  Fatigue: No  Night sweats: Yes  Chills: No  Increased stress: Yes  Excessive hunger: No  Excessive thirst: No  Feeling hot or cold when others believe the temperature is normal: No  Loss of height: No  Post-operative complications: No  Surgical site pain: No  Hallucinations: No  Change in or Loss of Energy: No  Hyperactivity: Yes  Confusion: No  Heart burn or indigestion: Yes  Nausea: No  Vomiting: No  Abdominal pain: Yes  Bloating: Yes  Constipation: Yes  Diarrhea: Yes  Blood in stool: No  Black stools: Yes  Rectal or Anal pain: No  Fecal incontinence: No  Yellowing of skin or eyes: No  Vomit with blood: No  Change in stools: Yes  Back pain: Yes  Muscle aches: Yes  Neck pain: No  Swollen joints: No  Joint pain: Yes  Bone pain: No  Muscle cramps: No  Muscle weakness: No  Joint stiffness: No  Bone fracture: No  Scrotal pain or swelling: No  Erectile dysfunction: Yes  Penile discharge: No  Genital ulcers: No  Reduced libido: No  Nervous or Anxious: Yes  Depression: No  Trouble sleeping: Yes  Trouble  thinking or concentrating: No  Mood changes: No  Panic attacks: Yes

## 2020-01-21 NOTE — NURSING NOTE
"Chief Complaint   Patient presents with     New Patient     New consult       Vitals:    01/21/20 1537   BP: (!) 141/81   Pulse: 58   Temp: 98.6  F (37  C)   TempSrc: Oral   SpO2: 100%   Weight: 95 kg (209 lb 8 oz)   Height: 1.854 m (6' 1\")       Body mass index is 27.64 kg/m .    Charo Higgins CMA    "

## 2020-01-22 ENCOUNTER — HOSPITAL ENCOUNTER (OUTPATIENT)
Facility: AMBULATORY SURGERY CENTER | Age: 60
End: 2020-01-22
Attending: INTERNAL MEDICINE
Payer: COMMERCIAL

## 2020-03-13 ENCOUNTER — TELEPHONE (OUTPATIENT)
Dept: GASTROENTEROLOGY | Facility: CLINIC | Age: 60
End: 2020-03-13

## 2020-03-13 NOTE — TELEPHONE ENCOUNTER
Patient Name: Anderson Mitchell   : 1960  MRN: [id number]       : N/A    MyChart Active    VM with information needed to complete pre-assessment call.  Request pt contact Endoscopy Pre-assessment RN to complete upcoming procedure information.  Telephone call-back number provided.    Ashly Montgomery RN  [facility]    Additional Information regarding appointment:  _____________________________________________________      Patient scheduled for:  EGD    Indication for procedure. Gastroesophageal reflux disease, esophagitis presence not specified    Sedation Type: MAC    Procedure Provider:  Kirk      Referring Provider. Belgica Rivers (PCP)    Arrival time verified: Fri / 3.20.2020 / 1200    Facility location verified:   [facility]  [address] [place] [id number]      History & Physical: N/A

## 2020-06-17 ENCOUNTER — TELEPHONE (OUTPATIENT)
Dept: GASTROENTEROLOGY | Facility: CLINIC | Age: 60
End: 2020-06-17

## 2020-07-06 DIAGNOSIS — Z11.59 ENCOUNTER FOR SCREENING FOR OTHER VIRAL DISEASES: Primary | ICD-10-CM

## 2020-07-24 ENCOUNTER — TELEPHONE (OUTPATIENT)
Dept: GASTROENTEROLOGY | Facility: CLINIC | Age: 60
End: 2020-07-24

## 2020-07-24 NOTE — TELEPHONE ENCOUNTER
Patient scheduled for EGD    Indication for procedure. Gastroesophageal reflux disease, esophagitis presence not specified; H/O malignant gastrointestinal stromal tumor (GIST    Referring Provider. Koons, Duane M, Greeno, Edward William, MD    ? No     Arrival time verified? 9 AM    Facility location verified? [facility]    Instructions given regarding prep and procedure Instructions reviewed    Prep Type  NPO    Are you taking any anticoagulants or blood thinners? No     Instructions given? N/a     Electronic implanted devices? Denies     Pre procedure teaching completed? Yes    Transportation from procedure?  policy reviewed. Instructed patient to have someone stay with him for 24 hours post exam    H&P / Pre op physical completed? N/a          [FreeTextEntry1] : check labs\par hold coumadin today\par no new issues to reprot\par

## 2020-07-27 ENCOUNTER — PATIENT OUTREACH (OUTPATIENT)
Dept: GASTROENTEROLOGY | Facility: CLINIC | Age: 60
End: 2020-07-27

## 2020-07-28 DIAGNOSIS — Z11.59 ENCOUNTER FOR SCREENING FOR OTHER VIRAL DISEASES: ICD-10-CM

## 2020-07-29 ENCOUNTER — ANESTHESIA EVENT (OUTPATIENT)
Dept: SURGERY | Facility: AMBULATORY SURGERY CENTER | Age: 60
End: 2020-07-29

## 2020-07-29 LAB
SARS-COV-2 RNA SPEC QL NAA+PROBE: NOT DETECTED
SPECIMEN SOURCE: NORMAL

## 2020-07-29 NOTE — PLAN OF CARE
Attempted to call Pt to confirm details of EGD on 7/30/20 with Dr. HARRIS Bradley. Pt hung up phone when he was informed his EGD may have to be rescheduled for another date d/t pending COVID test. Attempted to call Pt back and Writer stated the phone call was a courteous call to confirm details. Pt stated he knew the details and Pt hung up phone.

## 2020-07-30 ENCOUNTER — HOSPITAL ENCOUNTER (OUTPATIENT)
Facility: AMBULATORY SURGERY CENTER | Age: 60
End: 2020-07-30
Attending: INTERNAL MEDICINE
Payer: COMMERCIAL

## 2020-07-30 ENCOUNTER — ANESTHESIA (OUTPATIENT)
Dept: SURGERY | Facility: AMBULATORY SURGERY CENTER | Age: 60
End: 2020-07-30

## 2020-07-30 VITALS
SYSTOLIC BLOOD PRESSURE: 142 MMHG | HEART RATE: 60 BPM | DIASTOLIC BLOOD PRESSURE: 91 MMHG | HEIGHT: 73 IN | OXYGEN SATURATION: 100 % | BODY MASS INDEX: 26.64 KG/M2 | WEIGHT: 201 LBS | TEMPERATURE: 97.5 F | RESPIRATION RATE: 16 BRPM

## 2020-07-30 VITALS — HEART RATE: 60 BPM

## 2020-07-30 DIAGNOSIS — C49.A0 GASTROINTESTINAL STROMAL TUMOR (H): Primary | ICD-10-CM

## 2020-07-30 LAB — UPPER GI ENDOSCOPY: NORMAL

## 2020-07-30 RX ORDER — GLYCOPYRROLATE 0.2 MG/ML
INJECTION, SOLUTION INTRAMUSCULAR; INTRAVENOUS PRN
Status: DISCONTINUED | OUTPATIENT
Start: 2020-07-30 | End: 2020-07-30

## 2020-07-30 RX ORDER — ONDANSETRON 2 MG/ML
4 INJECTION INTRAMUSCULAR; INTRAVENOUS EVERY 30 MIN PRN
Status: DISCONTINUED | OUTPATIENT
Start: 2020-07-30 | End: 2020-07-31 | Stop reason: HOSPADM

## 2020-07-30 RX ORDER — CETIRIZINE HYDROCHLORIDE 10 MG/1
10 TABLET ORAL DAILY
COMMUNITY

## 2020-07-30 RX ORDER — MEPERIDINE HYDROCHLORIDE 25 MG/ML
12.5 INJECTION INTRAMUSCULAR; INTRAVENOUS; SUBCUTANEOUS
Status: DISCONTINUED | OUTPATIENT
Start: 2020-07-30 | End: 2020-07-31 | Stop reason: HOSPADM

## 2020-07-30 RX ORDER — PROPOFOL 10 MG/ML
INJECTION, EMULSION INTRAVENOUS PRN
Status: DISCONTINUED | OUTPATIENT
Start: 2020-07-30 | End: 2020-07-30

## 2020-07-30 RX ORDER — ONDANSETRON 4 MG/1
4 TABLET, ORALLY DISINTEGRATING ORAL EVERY 30 MIN PRN
Status: DISCONTINUED | OUTPATIENT
Start: 2020-07-30 | End: 2020-07-31 | Stop reason: HOSPADM

## 2020-07-30 RX ORDER — LIDOCAINE 40 MG/G
CREAM TOPICAL
Status: DISCONTINUED | OUTPATIENT
Start: 2020-07-30 | End: 2020-07-31 | Stop reason: HOSPADM

## 2020-07-30 RX ORDER — ONDANSETRON 4 MG/1
4 TABLET, ORALLY DISINTEGRATING ORAL EVERY 6 HOURS PRN
Status: CANCELLED | OUTPATIENT
Start: 2020-07-30

## 2020-07-30 RX ORDER — ONDANSETRON 2 MG/ML
4 INJECTION INTRAMUSCULAR; INTRAVENOUS
Status: DISCONTINUED | OUTPATIENT
Start: 2020-07-30 | End: 2020-07-31 | Stop reason: HOSPADM

## 2020-07-30 RX ORDER — SODIUM CHLORIDE, SODIUM LACTATE, POTASSIUM CHLORIDE, CALCIUM CHLORIDE 600; 310; 30; 20 MG/100ML; MG/100ML; MG/100ML; MG/100ML
INJECTION, SOLUTION INTRAVENOUS CONTINUOUS PRN
Status: DISCONTINUED | OUTPATIENT
Start: 2020-07-30 | End: 2020-07-30

## 2020-07-30 RX ORDER — NALOXONE HYDROCHLORIDE 0.4 MG/ML
.1-.4 INJECTION, SOLUTION INTRAMUSCULAR; INTRAVENOUS; SUBCUTANEOUS
Status: DISCONTINUED | OUTPATIENT
Start: 2020-07-30 | End: 2020-07-31 | Stop reason: HOSPADM

## 2020-07-30 RX ORDER — FLUMAZENIL 0.1 MG/ML
0.2 INJECTION, SOLUTION INTRAVENOUS
Status: CANCELLED | OUTPATIENT
Start: 2020-07-30 | End: 2020-07-30

## 2020-07-30 RX ORDER — ONDANSETRON 2 MG/ML
4 INJECTION INTRAMUSCULAR; INTRAVENOUS EVERY 6 HOURS PRN
Status: CANCELLED | OUTPATIENT
Start: 2020-07-30

## 2020-07-30 RX ORDER — NALOXONE HYDROCHLORIDE 0.4 MG/ML
.1-.4 INJECTION, SOLUTION INTRAMUSCULAR; INTRAVENOUS; SUBCUTANEOUS
Status: CANCELLED | OUTPATIENT
Start: 2020-07-30 | End: 2020-07-31

## 2020-07-30 RX ORDER — SODIUM CHLORIDE, SODIUM LACTATE, POTASSIUM CHLORIDE, CALCIUM CHLORIDE 600; 310; 30; 20 MG/100ML; MG/100ML; MG/100ML; MG/100ML
INJECTION, SOLUTION INTRAVENOUS CONTINUOUS
Status: DISCONTINUED | OUTPATIENT
Start: 2020-07-30 | End: 2020-07-31 | Stop reason: HOSPADM

## 2020-07-30 RX ORDER — SODIUM CHLORIDE, SODIUM LACTATE, POTASSIUM CHLORIDE, CALCIUM CHLORIDE 600; 310; 30; 20 MG/100ML; MG/100ML; MG/100ML; MG/100ML
500 INJECTION, SOLUTION INTRAVENOUS CONTINUOUS
Status: DISCONTINUED | OUTPATIENT
Start: 2020-07-30 | End: 2020-07-31 | Stop reason: HOSPADM

## 2020-07-30 RX ADMIN — PROPOFOL 50 MG: 10 INJECTION, EMULSION INTRAVENOUS at 10:06

## 2020-07-30 RX ADMIN — PROPOFOL 70 MG: 10 INJECTION, EMULSION INTRAVENOUS at 09:59

## 2020-07-30 RX ADMIN — PROPOFOL 100 MG: 10 INJECTION, EMULSION INTRAVENOUS at 10:01

## 2020-07-30 RX ADMIN — SODIUM CHLORIDE, SODIUM LACTATE, POTASSIUM CHLORIDE, CALCIUM CHLORIDE: 600; 310; 30; 20 INJECTION, SOLUTION INTRAVENOUS at 09:54

## 2020-07-30 RX ADMIN — GLYCOPYRROLATE 0.2 MG: 0.2 INJECTION, SOLUTION INTRAMUSCULAR; INTRAVENOUS at 09:56

## 2020-07-30 RX ADMIN — SODIUM CHLORIDE, SODIUM LACTATE, POTASSIUM CHLORIDE, CALCIUM CHLORIDE 500 ML: 600; 310; 30; 20 INJECTION, SOLUTION INTRAVENOUS at 09:10

## 2020-07-30 ASSESSMENT — MIFFLIN-ST. JEOR: SCORE: 1785.61

## 2020-07-30 NOTE — DISCHARGE INSTRUCTIONS
Discharge Instructions after Upper Endoscopy (EGD)       Activity and Diet   You were given medicine for pain. You may be dizzy or sleepy.   For 24 hours:     Do not drive or use heavy equipment.     Do not make important decisions.     Do not drink any alcohol.   ___ You may return to your regular diet.       Discomfort   You may have a sore throat for 2 to 3 days. It may help to:     Avoid hot liquids for 24 hours.     Use sore throat lozenges.     Gargle as needed with salt water up to 4 times a day. Mix 1 cup of warm water with 1 teaspoon of salt. Do not swallow.   ___ Your esophagus was dilated (opened) or banded during the exam:     Drink only cool liquids for the rest of the day. Eat a soft diet for the next few days.     You may have a sore chest for 2 to 3 days.       You may take Tylenol (acetaminophen) for pain unless your doctor has told you not to.       Do not take aspirin or ibuprofen (Advil, Motrin) or other NSAIDS (anti-inflammatory drugs) for ___ days.       Follow-up   ___ We took small tissue samples for study. If you do not have a follow-up visit scheduled, call your provider s office in 2 weeks for the results.       Other instructions________________________________________________________       When to call us:   Problems are rare. Call right away if you have:     Unusual throat pain or trouble swallowing     Unusual pain in belly or chest that is not relieved by belching or passing air     Black stools (tar-like looking bowel movement)     Temperature above 100.6  F. (37.5  C).       If you vomit blood or have severe pain, go to an emergency room.       If you have questions, call:   Monday to Friday, 7 a.m. to 4:30 p.m.: Endoscopy: [phone] (We may have to call you back)       After hours: Hospital: [phone] (Ask for the GI fellow on call)

## 2020-07-30 NOTE — PRE-PROCEDURE
Anderson Mitchell  [id number]  male  60 year old      Reason for procedure/surgery: GERD, history of GIST    Patient Active Problem List   Diagnosis     Shoulder pain     Gastrointestinal stromal tumor (H)     Heterozygous thalassemia     Essential hypertension       Past Surgical History:    Past Surgical History:   Procedure Laterality Date     ABDOMEN SURGERY       COLONOSCOPY  4/6/2012    Procedure:COLONOSCOPY; Surgeon:TAMIE REYNA; Location: GI     ORTHOPEDIC SURGERY         Past Medical History:   Past Medical History:   Diagnosis Date     Anxiety 2016    not Medicated     Arthritis     tr knee and both ankles     Chronic constipation NOW    from Re-flux med     Fracture 2016    lower lt ankle     Gastrointestinal stromal tumor (H) 5/5/2008     Hypertension      Malignant neoplasm (H)     gastrointestinal mass-removed 5-2008     Thalassaemia trait 5/7/2013       Social History:   Social History     Tobacco Use     Smoking status: Former Smoker     Packs/day: 0.00     Years: 0.00     Pack years: 0.00     Types: Cigars     Smokeless tobacco: Never Used     Tobacco comment: OCC. CIGAR   Substance Use Topics     Alcohol use: Yes     Alcohol/week: 0.0 standard drinks     Comment: 2 a week       Family History:   Family History   Problem Relation Age of Onset     Hypertension Father         Descease     Respiratory Father         YUNG     Cerebrovascular Disease Father         ,     C.A.D. Father      Respiratory Brother         YUNG     Hypertension Brother      Thyroid Disease Mother         descease     Hypertension Brother        Allergies:   Allergies   Allergen Reactions     Nkda [No Known Drug Allergies]        Active Medications:   Current Outpatient Medications   Medication Sig Dispense Refill     cetirizine (ZYRTEC) 10 MG tablet Take 10 mg by mouth daily       fluticasone (FLONASE) 50 MCG/ACT nasal spray 2 sprays by Both Nostrils route daily.       hydrochlorothiazide (HYDRODIURIL) 25  "MG tablet Take 1 tablet by mouth daily.       Multiple Vitamin (MULTIVITAMIN) per tablet Take 1 tablet by mouth daily.       rabeprazole (ACIPHEX) 20 MG tablet Take 3 tablets by mouth daily.       VERAPAMIL HCL PO Take 240 mg by mouth          Systemic Review:   CONSTITUTIONAL: NEGATIVE for fever, chills, change in weight  ENT/MOUTH: NEGATIVE for ear, mouth and throat problems  RESP: NEGATIVE for significant cough or SOB  CV: NEGATIVE for chest pain, palpitations or peripheral edema    Physical Examination:   Vital Signs: BP (!) 157/92   Temp 97.9  F (36.6  C) (Oral)   Resp 16   Ht 1.854 m (6' 1\")   Wt 91.2 kg (201 lb)   SpO2 100%   BMI 26.52 kg/m    GENERAL: healthy, alert and no distress  NECK: no adenopathy, no asymmetry, masses, or scars  RESP: lungs clear to auscultation - no rales, rhonchi or wheezes  CV: regular rate and rhythm, normal S1 S2, no S3 or S4, no murmur, click or rub, no peripheral edema and peripheral pulses strong  ABDOMEN: soft, nontender, no hepatosplenomegaly, no masses and bowel sounds normal  MS: no gross musculoskeletal defects noted, no edema    Plan: Appropriate to proceed as scheduled.      Tejas Bradley MD  7/30/2020    PCP:  Koons, Duane M "

## 2020-07-30 NOTE — ANESTHESIA PREPROCEDURE EVALUATION
"Anesthesia Pre-Procedure Evaluation    Patient: Anderson Mitchell   MRN:     [id number] Gender:   male   Age:    60 year old :      1960        Preoperative Diagnosis: Gastroesophageal reflux disease, esophagitis presence not specified [K21.9]   Procedure(s):  ESOPHAGOGASTRODUODENOSCOPY (EGD)     LABS:  CBC:   Lab Results   Component Value Date    WBC 5.3 2013    WBC 5.5 2012    HGB 15.5 2013    HGB 15.9 2012    HCT 42.3 2013    HCT 42.8 2012     2013     2012     BMP:   Lab Results   Component Value Date     (H) 2013     2012    POTASSIUM 3.9 2013    POTASSIUM 4.0 2012    CHLORIDE 105 2013    CHLORIDE 104 2012    CO2 28 2013    CO2 27 2012    BUN 16 2013    BUN 20 2012    CR 1.22 2013    CR 1.18 2012     (H) 2013     (H) 2012     COAGS: No results found for: PTT, INR, FIBR  POC: No results found for: BGM, HCG, HCGS  OTHER:   Lab Results   Component Value Date    JAMES 8.6 2013    ALBUMIN 3.7 (L) 2013    PROTTOTAL 7.1 2013    ALT 47 2013    AST 45 2013    ALKPHOS 52 2013    BILITOTAL 0.6 2013        Preop Vitals    BP Readings from Last 3 Encounters:   20 (!) 157/92   20 (!) 141/81   14 (!) 133/96    Pulse Readings from Last 3 Encounters:   20 58   14 82   13 84      Resp Readings from Last 3 Encounters:   20 16   14 20   13 24    SpO2 Readings from Last 3 Encounters:   20 100%   20 100%   14 94%      Temp Readings from Last 1 Encounters:   20 36.6  C (97.9  F) (Oral)    Ht Readings from Last 1 Encounters:   20 1.854 m (6' 1\")      Wt Readings from Last 1 Encounters:   20 91.2 kg (201 lb)    Estimated body mass index is 26.52 kg/m  as calculated from the following:    Height as of this encounter: 1.854 m " "(6' 1\").    Weight as of this encounter: 91.2 kg (201 lb).     LDA:  Peripheral IV 07/30/20 Right Hand (Active)   Site Assessment WDL 07/30/20 0903   Line Status Saline locked 07/30/20 0903   Phlebitis Scale 0-->no symptoms 07/30/20 0903   Infiltration Scale 0 07/30/20 0903   Number of days: 0        Past Medical History:   Diagnosis Date     Anxiety 2016    not Medicated     Arthritis     tr knee and both ankles     Chronic constipation NOW    from Re-flux med     Fracture 2016    lower lt ankle     Gastrointestinal stromal tumor (H) 5/5/2008     Hypertension      Malignant neoplasm (H)     gastrointestinal mass-removed 5-2008     Thalassaemia trait 5/7/2013      Past Surgical History:   Procedure Laterality Date     ABDOMEN SURGERY       COLONOSCOPY  4/6/2012    Procedure:COLONOSCOPY; Surgeon:TAMIE REYNA; Location: GI     ORTHOPEDIC SURGERY        Allergies   Allergen Reactions     Nkda [No Known Drug Allergies]         Anesthesia Evaluation     . Pt has had prior anesthetic. Type: MAC    No history of anesthetic complications          ROS/MED HX    ENT/Pulmonary:  - neg pulmonary ROS     Neurologic:       Cardiovascular:     (+) hypertension-range: 140/80s, ---. : . . . :. .       METS/Exercise Tolerance:  >4 METS   Hematologic:         Musculoskeletal:         GI/Hepatic:     (+) GERD (asymptomatic today)       Renal/Genitourinary:         Endo:         Psychiatric:         Infectious Disease:         Malignancy:         Other:                         PHYSICAL EXAM:   Mental Status/Neuro: A/A/O   Airway: Facies: Feasible  Mallampati: I  Mouth/Opening: Full  TM distance: > 6 cm  Neck ROM: Full   Respiratory: Auscultation: CTAB     Resp. Rate: Normal     Resp. Effort: Normal      CV: Rhythm: Regular  Rate: Age appropriate  Heart: Normal Sounds  Edema: None   Comments:      Dental: Normal Dentition                Assessment:   ASA SCORE: 2    H&P: History and physical reviewed and following " examination; no interval change.   Smoking Status:  Non-Smoker/Unknown   NPO Status: NPO Appropriate     Plan:   Anes. Type:  MAC   Pre-Medication: None   Induction:  N/a   Airway: Native Airway   Access/Monitoring: PIV   Maintenance: N/a     Postop Plan:   Postop Pain: None  Postop Sedation/Airway: Not planned  Disposition: Outpatient     PONV Management:   Adult Risk Factors:, Non-Smoker     CONSENT: Direct conversation   Plan and risks discussed with: Patient          Comments for Plan/Consent:  Discussed plan for MAC, including risk of aspiration pneumonia, backup plan of general anesthesia and risks of general anesthesia, including sore throat/hoarse voice, abrasions/damage to lips/tongue/teeth, nausea, rare complications (including medication reactions, cardiac, pulmonary).                   Cheryl Rider MD

## 2020-07-30 NOTE — ANESTHESIA POSTPROCEDURE EVALUATION
Anesthesia POST Procedure Evaluation    Patient: Anderson Mitchell   MRN:     [id number] Gender:   male   Age:    60 year old :      1960        Preoperative Diagnosis: Gastroesophageal reflux disease, esophagitis presence not specified [K21.9]   Procedure(s):  ESOPHAGOGASTRODUODENOSCOPY, WITH BIOPSY   Postop Comments: No value filed.     Anesthesia Type: MAC       Disposition: Outpatient   Postop Pain Control: Uneventful            Sign Out: Well controlled pain   PONV: No   Neuro/Psych: Uneventful            Sign Out: Acceptable/Baseline neuro status   Airway/Respiratory: Uneventful            Sign Out: Acceptable/Baseline resp. status   CV/Hemodynamics: Uneventful            Sign Out: Acceptable CV status   Other NRE: NONE   DID A NON-ROUTINE EVENT OCCUR? No         Last Anesthesia Record Vitals:  CRNA VITALS  2020 0944 - 2020 1044      2020             SpO2:  96 %          Last PACU Vitals:  Vitals Value Taken Time   BP     Temp     Pulse 60 2020 10:10 AM   Resp     SpO2     Temp src     NIBP 150/89 2020 10:10 AM   Pulse 65 2020 10:11 AM   SpO2 96 % 2020 10:12 AM   Resp 19 2020 10:11 AM   Temp     Ht Rate 64 2020 10:11 AM   Temp 2           Electronically Signed By: Cheryl Rider MD, 2020, 12:30 PM

## 2020-07-31 LAB — COPATH REPORT: NORMAL

## 2020-12-10 ENCOUNTER — ANCILLARY PROCEDURE (OUTPATIENT)
Dept: CT IMAGING | Facility: CLINIC | Age: 60
End: 2020-12-10
Attending: INTERNAL MEDICINE
Payer: COMMERCIAL

## 2020-12-10 DIAGNOSIS — C49.A0 GASTROINTESTINAL STROMAL TUMOR (H): ICD-10-CM

## 2020-12-10 LAB — RADIOLOGIST FLAGS: NORMAL

## 2020-12-10 PROCEDURE — 74177 CT ABD & PELVIS W/CONTRAST: CPT | Performed by: RADIOLOGY

## 2020-12-10 RX ORDER — IOPAMIDOL 755 MG/ML
123 INJECTION, SOLUTION INTRAVASCULAR ONCE
Status: COMPLETED | OUTPATIENT
Start: 2020-12-10 | End: 2020-12-10

## 2020-12-10 RX ADMIN — IOPAMIDOL 123 ML: 755 INJECTION, SOLUTION INTRAVASCULAR at 09:14

## 2020-12-11 ENCOUNTER — MYC MEDICAL ADVICE (OUTPATIENT)
Dept: GASTROENTEROLOGY | Facility: CLINIC | Age: 60
End: 2020-12-11

## 2020-12-15 DIAGNOSIS — N28.1 KIDNEY CYST, ACQUIRED: Primary | ICD-10-CM

## 2020-12-16 ENCOUNTER — TELEPHONE (OUTPATIENT)
Dept: UROLOGY | Facility: CLINIC | Age: 60
End: 2020-12-16

## 2020-12-16 NOTE — TELEPHONE ENCOUNTER
[facility] Call Center    Phone Message    May a detailed message be left on voicemail: yes     Reason for Call: Appointment Intake    Referring Provider Name: Tejas Bradley MD in [facility] GA[facility]OLOGY  Diagnosis and/or Symptoms: incidentally noted left kidney cyst vs RCC, please coordinate appropriate workup and follow up.    Spoke with patient and offered next available video appointment, which is 2/26. Patient stated that he is more concerned and is not willing to wait until then. Please advise on any possible sooner openings. Patient can be reached directly at [phone]. Thanks!    Action Taken: Message routed to:  [facility] Urology    Travel Screening: Not Applicable

## 2020-12-16 NOTE — TELEPHONE ENCOUNTER
uncomfotable  with burning during urination pressure to go for months  His ct scan notes enlargement and getting larger can you give me a time sooner for his consultation . Natalie Benton, [facility] Staff Nurse

## 2020-12-19 ENCOUNTER — MYC MEDICAL ADVICE (OUTPATIENT)
Dept: ORTHOPEDICS | Facility: CLINIC | Age: 60
End: 2020-12-19

## 2020-12-19 DIAGNOSIS — M25.572 PAIN IN JOINT INVOLVING ANKLE AND FOOT, LEFT: Primary | ICD-10-CM

## 2020-12-21 NOTE — TELEPHONE ENCOUNTER
MEDICAL RECORDS REQUEST   [facility]  Urology Clinic  [address] [place] [address]  PHONE: [phone]  Fax: [phone]        FUTURE VISIT INFORMATION                                                   Anderson Mitchell, : 1960 scheduled for future visit at [facility]    APPOINTMENT INFORMATION:    Date: 20 2:40PM    Provider:  Raymundo Vivas MD    Reason for Visit/Diagnosis: Renal cyst     REFERRAL INFORMATION:    Referring provider:  N/A    Specialty: N/A    Referring providers clinic:      Clinic contact number:  N/A    RECORDS REQUESTED FOR VISIT                                                     NOTES  STATUS/DETAILS   OFFICE NOTE from referring provider  yes   OFFICE NOTE from other specialist  no   DISCHARGE SUMMARY from hospital  no   DISCHARGE REPORT from the ER  no   OPERATIVE REPORT  no   MEDICATION LIST  no   KIDNEY CYST      CT ABD PELVIS   yes- 12/10/20      PRE-VISIT CHECKLIST      Record collection complete Yes   Appointment appropriately scheduled           (right time/right provider) Yes   MyChart activation Yes   Questionnaire complete If no, please explain: In process      Completed by: Viky Higgins

## 2020-12-23 ENCOUNTER — VIRTUAL VISIT (OUTPATIENT)
Dept: UROLOGY | Facility: CLINIC | Age: 60
End: 2020-12-23
Attending: INTERNAL MEDICINE
Payer: COMMERCIAL

## 2020-12-23 ENCOUNTER — PRE VISIT (OUTPATIENT)
Dept: UROLOGY | Facility: CLINIC | Age: 60
End: 2020-12-23

## 2020-12-23 DIAGNOSIS — Z11.59 ENCOUNTER FOR SCREENING FOR OTHER VIRAL DISEASES: Primary | ICD-10-CM

## 2020-12-23 DIAGNOSIS — N28.1 KIDNEY CYST, ACQUIRED: Primary | ICD-10-CM

## 2020-12-23 DIAGNOSIS — N28.89 LEFT RENAL MASS: ICD-10-CM

## 2020-12-23 PROCEDURE — 99243 OFF/OP CNSLTJ NEW/EST LOW 30: CPT | Mod: 95 | Performed by: UROLOGY

## 2020-12-23 RX ORDER — ACETAMINOPHEN 160 MG/5ML
300 SUSPENSION, ORAL (FINAL DOSE FORM) ORAL 3 TIMES DAILY
COMMUNITY
End: 2021-07-15

## 2020-12-23 NOTE — PROGRESS NOTES
"Anderson Mitchell is a 60 year old male who is being evaluated via a billable video visit.       Video Visit Technology for this patient: No video technology available to patient, please call patient over the phone   [phone]         The patient has been notified of following:      \"This video visit will be conducted via a call between you and your physician/provider. We have found that certain health care needs can be provided without the need for an in-person physical exam.  This service lets us provide the care you need with a video conversation.  If a prescription is necessary we can send it directly to your pharmacy.  If lab work is needed we can place an order for that and you can then stop by our lab to have the test done at a later time.     Video visits are billed at different rates depending on your insurance coverage.  Please reach out to your insurance provider with any questions.     If during the course of the call the physician/provider feels a video visit is not appropriate, you will not be charged for this service.\"     Patient has given verbal consent for Video visit? Yes  How would you like to obtain your AVS? MyChart  If you are dropped from the video visit, the video invite should be resent to: Text to cell phone: [phone]     Will anyone else be joining your video visit? No          Chief Complaint:   Left Renal Mass         Consult or Referral:     Mr. Anderson Mitchell is a 60 year old male evaluated at the request of Dr. Bradley.         History of Present Illness:    Anderson Mitchell is a very pleasant 60 year old male who presents with a history of left renal mass/complex cyst. This was noted incidentally on recent CT.    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     Hypertension      Malignant neoplasm (H)     gastrointestinal mass-removed 5-2008     Thalassaemia trait 5/7/2013          Past Surgical History:     Past Surgical History:   Procedure Laterality Date     ABDOMEN SURGERY       COLONOSCOPY  4/6/2012    Procedure:COLONOSCOPY; Surgeon:TAMIE REYNA; Location: GI     ESOPHAGOSCOPY, GASTROSCOPY, DUODENOSCOPY (EGD), COMBINED N/A 7/30/2020    Procedure: ESOPHAGOGASTRODUODENOSCOPY, WITH BIOPSY;  Surgeon: Tejas Bradley MD;  [facility]     ORTHOPEDIC SURGERY            Medications     Current Outpatient Medications   Medication     cetirizine (ZYRTEC) 10 MG tablet     fluticasone (FLONASE) 50 MCG/ACT nasal spray     Multiple Vitamin (MULTIVITAMIN) per tablet     rabeprazole (ACIPHEX) 20 MG tablet     st pittman wort 300 MG TABS tablet     VERAPAMIL HCL PO     hydrochlorothiazide (HYDRODIURIL) 25 MG tablet     No current facility-administered medications for this visit.             Family History:     Family History   Problem Relation Age of Onset     Hypertension Father         Descease     Respiratory Father         YUNG     Cerebrovascular Disease Father         ,     C.A.D. Father      Respiratory Brother         YUNG     Hypertension Brother      Thyroid Disease Mother         descease     Hypertension Brother      No known family history of  malignancy.         Social History:     Social History     Socioeconomic History     Marital status: Single     Spouse name: Not on file     Number of children: Not on file     Years of education: Not on file     Highest education level: Not on file   Occupational History     Not on file   Social Needs     Financial resource strain: Not on file     Food insecurity     Worry: Not on file     Inability: Not on file     Transportation needs     Medical: Not on file     Non-medical: Not on file   Tobacco Use     Smoking status: Former Smoker     Packs/day: 0.00      Years: 0.00     Pack years: 0.00     Types: Cigars     Smokeless tobacco: Never Used     Tobacco comment: OCC. CIGAR   Substance and Sexual Activity     Alcohol use: Yes     Alcohol/week: 0.0 standard drinks     Comment: 2 a week     Drug use: No     Sexual activity: Never   Lifestyle     Physical activity     Days per week: Not on file     Minutes per session: Not on file     Stress: Not on file   Relationships     Social connections     Talks on phone: Not on file     Gets together: Not on file     Attends Hinduism service: Not on file     Active member of club or organization: Not on file     Attends meetings of clubs or organizations: Not on file     Relationship status: Not on file     Intimate partner violence     Fear of current or ex partner: Not on file     Emotionally abused: Not on file     Physically abused: Not on file     Forced sexual activity: Not on file   Other Topics Concern     Parent/sibling w/ CABG, MI or angioplasty before 65F 55M? Not Asked   Social History Narrative     Not on file            Allergies:   Cats, Nkda [no known drug allergies], No clinical screening - see comments, and Nuts         Review of Systems:  From intake questionnaire     Skin: negative  Eyes: negative  Ears/Nose/Throat: negative  Respiratory: No shortness of breath, dyspnea on exertion, cough, or hemoptysis  Cardiovascular: No chest pain or palpitations  Gastrointestinal: negative; no nausea/vomiting, constipation or diarrhea  Genitourinary: as per HPI  Musculoskeletal: negative  Neurologic: negative  Psychiatric: negative  Hematologic/Lymphatic/Immunologic: negative  Endocrine: negative         Physical Exam:     Patient is a 60 year old  male   Vitals: There were no vitals taken for this visit.  Constitutional: There is no height or weight on file to calculate BMI.  Alert, no acute distress, oriented, conversant  Eyes: no scleral icterus; extraocular muscles intact  Respiratory: no respiratory distress, or pursed  lip breathing  Musculoskeletal: normal range of motion  Skin: no notable lesions visible  Neuro: Alert, oriented, speech and mentation normal  Psych: affect and mood normal, alert and oriented to person, place and time  ***      Labs and Pathology:    I reviewed all applicable laboratory and pathology data and went over findings with patient  Significant for   Lab Results   Component Value Date    CR 1.22 05/07/2013    CR 1.18 05/08/2012    CR 1.22 11/02/2011    CR 1.33 05/02/2011    CR 1.15 11/03/2010    CR 1.12 05/03/2010    CR 1.16 12/10/2009    CR 1.15 09/03/2009    CR 1.13 06/04/2009    CR 1.21 03/09/2009         Imaging:    I directly visualized and reviewed all applicable imaging a with patient.    CT abd/pelvis 12/10/2020   IMPRESSION:  In this patient with a history of GIST and partial  gastrectomy: No evidence for disease recurrence.  1. Incidentally noted 7 mm left renal inferior pole nonsimple cyst  which has grown since 2/9/2016. Differential proteinaceous cyst versus  renal cell carcinoma. Recommend MRI for further evaluation or urologic  consultation.      Outside and Past Medical records:    I spent 10 minutes reviewing outside and past medical records.         Assessment and Plan:     Assessment:   {Diagnoses:967634}    Plan:  ***    Orders  No orders of the defined types were placed in this encounter.        Video-Visit Details    Type of service:  Video Visit    Video Start Time: 2:58 PM  Video End Time: 3:11 PM    Originating Location (pt. Location): Home    Distant Location (provider location):  [facility] UROLOGY AND [facility] FOR PROSTATE AND UROLOGIC CANCERS    Platform used for Video Visit: Guanakito Vivas MD  Urology  [facility]

## 2020-12-23 NOTE — LETTER
"12/23/2020       RE: Anderson Mitchell  [address]  [place] [address]     Dear Colleague,    Thank you for referring your patient, Anderson Mitchell, to the [facility]OLOGY [facility] [place] [facility]. Please see a copy of my visit note below.    Anderson Mitchell is a 60 year old male who is being evaluated via a billable video visit.       Video Visit Technology for this patient: No video technology available to patient, please call patient over the phone   [phone]          The patient has been notified of following:      \"This video visit will be conducted via a call between you and your physician/provider. We have found that certain health care needs can be provided without the need for an in-person physical exam.  This service lets us provide the care you need with a video conversation.  If a prescription is necessary we can send it directly to your pharmacy.  If lab work is needed we can place an order for that and you can then stop by our lab to have the test done at a later time.     Video visits are billed at different rates depending on your insurance coverage.  Please reach out to your insurance provider with any questions.     If during the course of the call the physician/provider feels a video visit is not appropriate, you will not be charged for this service.\"     Patient has given verbal consent for Video visit? Yes  How would you like to obtain your AVS? MyChart  If you are dropped from the video visit, the video invite should be resent to: Text to cell phone: [phone]     Will anyone else be joining your video visit? No          Chief Complaint:   Left Renal Mass         Consult or Referral:     Mr. Anderson Mitchell is a 60 year old male evaluated at the request of Dr. Bradley.         History of Present Illness:    Anderson Mitchell is a very pleasant 60 year old male who presents with a " history of left renal mass/complex cyst. This was noted incidentally on recent CT. No abd or flank pain. No hematuria.    ##RENAL MASS INITIAL ENCOUNTER##     Anderson Mitchell is a very pleasant 60 year old male who presents with a history of a Cystic renal lesion/mass.  This was discovered incidentally.  Initially diagnosed about 2 week(s) ago.  The lesion is cystic ( Category 2 (few thin septa, hyperdense cyst <3 cm, well marginated).  The maximal dimension of the renal lesion is <1 centimeters and located on the left side.      Concerning  his renal function, his last SCr is   Lab Results   Component Value Date    CR 1.22 05/07/2013   .  he has the following risk factors for development of chronic kidney disease none.       he does have a history of previous abdominal or retroperitoneal surgery.  There is not a family history of renal cell cancer.  The patient has a history of smoking and currently is not smoking.    The mass/lesion is located in the Left side and is primarily located in the lower pole.  The tumor is also primarily endophytic.  The mass/lesion primarily lies on the anterior surface.  With regard to the collecting system, the edge of the tumor arrives more than 7 mm from the collecting system.    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          Past Surgical History:     Past Surgical History:   Procedure Laterality Date     ABDOMEN SURGERY       COLONOSCOPY  4/6/2012    Procedure:COLONOSCOPY; Surgeon:TAMIE REYNA; Location: GI     ESOPHAGOSCOPY, GASTROSCOPY, DUODENOSCOPY (EGD),  COMBINED N/A 7/30/2020    Procedure: ESOPHAGOGASTRODUODENOSCOPY, WITH BIOPSY;  Surgeon: Tejas Bradley MD;  [facility]     ORTHOPEDIC SURGERY            Medications     Current Outpatient Medications   Medication     cetirizine (ZYRTEC) 10 MG tablet     fluticasone (FLONASE) 50 MCG/ACT nasal spray     Multiple Vitamin (MULTIVITAMIN) per tablet     rabeprazole (ACIPHEX) 20 MG tablet     st pittman wort 300 MG TABS tablet     VERAPAMIL HCL PO     hydrochlorothiazide (HYDRODIURIL) 25 MG tablet     No current facility-administered medications for this visit.             Family History:     Family History   Problem Relation Age of Onset     Hypertension Father         Descease     Respiratory Father         YUNG     Cerebrovascular Disease Father         ,     C.A.D. Father      Respiratory Brother         YUNG     Hypertension Brother      Thyroid Disease Mother         descease     Hypertension Brother      No known family history of  malignancy.         Social History:     Social History     Socioeconomic History     Marital status: Single     Spouse name: Not on file     Number of children: Not on file     Years of education: Not on file     Highest education level: Not on file   Occupational History     Not on file   Social Needs     Financial resource strain: Not on file     Food insecurity     Worry: Not on file     Inability: Not on file     Transportation needs     Medical: Not on file     Non-medical: Not on file   Tobacco Use     Smoking status: Former Smoker     Packs/day: 0.00     Years: 0.00     Pack years: 0.00     Types: Cigars     Smokeless tobacco: Never Used     Tobacco comment: OCC. CIGAR   Substance and Sexual Activity     Alcohol use: Yes     Alcohol/week: 0.0 standard drinks     Comment: 2 a week     Drug use: No     Sexual activity: Never   Lifestyle     Physical activity     Days per week: Not on file     Minutes per session: Not on file     Stress: Not on file   Relationships     Social  connections     Talks on phone: Not on file     Gets together: Not on file     Attends Gnosticist service: Not on file     Active member of club or organization: Not on file     Attends meetings of clubs or organizations: Not on file     Relationship status: Not on file     Intimate partner violence     Fear of current or ex partner: Not on file     Emotionally abused: Not on file     Physically abused: Not on file     Forced sexual activity: Not on file   Other Topics Concern     Parent/sibling w/ CABG, MI or angioplasty before 65F 55M? Not Asked   Social History Narrative     Not on file            Allergies:   Cats, Nkda [no known drug allergies], No clinical screening - see comments, and Nuts         Review of Systems:  From intake questionnaire     Skin: negative  Eyes: negative  Ears/Nose/Throat: negative  Respiratory: No shortness of breath, dyspnea on exertion, cough, or hemoptysis  Cardiovascular: No chest pain or palpitations  Gastrointestinal: negative; no nausea/vomiting, constipation or diarrhea  Genitourinary: as per HPI  Musculoskeletal: negative  Neurologic: negative  Psychiatric: negative  Hematologic/Lymphatic/Immunologic: negative  Endocrine: negative         Physical Exam:     Patient is a 60 year old  male   Vitals: There were no vitals taken for this visit.  Constitutional: There is no height or weight on file to calculate BMI.  Alert, no acute distress, oriented, conversant  Eyes: no scleral icterus; extraocular muscles intact  Respiratory: no respiratory distress, or pursed lip breathing  Musculoskeletal: normal range of motion  Skin: no notable lesions visible  Neuro: Alert, oriented, speech and mentation normal  Psych: affect and mood normal, alert and oriented to person, place and time      Labs and Pathology:    I reviewed all applicable laboratory and pathology data and went over findings with patient  Significant for   Lab Results   Component Value Date    CR 1.22 05/07/2013    CR 1.18  05/08/2012    CR 1.22 11/02/2011    CR 1.33 05/02/2011    CR 1.15 11/03/2010    CR 1.12 05/03/2010    CR 1.16 12/10/2009    CR 1.15 09/03/2009    CR 1.13 06/04/2009    CR 1.21 03/09/2009       Imaging:    I directly visualized and reviewed all applicable imaging a with patient.    CT abd/pelvis 12/10/2020   IMPRESSION:  In this patient with a history of GIST and partial  gastrectomy: No evidence for disease recurrence.  1. Incidentally noted 7 mm left renal inferior pole nonsimple cyst  which has grown since 2/9/2016. Differential proteinaceous cyst versus  renal cell carcinoma. Recommend MRI for further evaluation or urologic  consultation.      Outside and Past Medical records:    I spent 10 minutes reviewing outside and past medical records.         Assessment and Plan:     Assessment: 60 year old male with 7 mm hyperdense, indeterminate renal cyst in the lower pole of the left kidney. This was discovered incidentally. We had an extensive discussion about the significance of a localized, enhancing kidney mass.  We discussed that approximately 80% of enhancing renal masses turn out to be kidney cancer upon removal, while 20% are found to be benign.  Although certain features such as size, gender and tumor characteristics can change these risks.    We discussed the role of renal mass biopsy including the fact that renal mass biopsy is safe with current techniques, it can be inaccurate and it can be non-diagnostic.  Furthermore, the majority of patients find they ultimately need to proceed with treatment anyway.      We discussed the options for treatment of a localized kidney mass including active surveillance, thermal ablation, and surgical extirpation.  Surgical removal has the best track record and close to a 99% chance of cure for T1a lesions compared to 90% cure with thermal ablation and is generally preferred.  Furthermore, thermal ablation is not optimal for larger masses or centrally located masses.      We  discussed the role of observation and the low risk of metastases associated with lesions less than 2-3 cm in size and the potential for slow/stable growth in many cases.  However, the unpredictable natural history of these lesions was mentioned as a drawback with this approach and the lack of effective therapy in the event of systemic progression.  In general, this approach is most favored for those with limited life expectancy and/or those with indeterminate (< 2-3 cm) renal masses.    We briefly discussed the role of surgery to address renal masses, but in this circumstance, the current mass is small in indeterminate in nature. As such, a period of surveillance would be reasonable. Will plan to obtain MRI in 6 months to better assess nature of this lesion and evaluate interval growth. He is agreeable with the plan for surveillance and will follow-up with me in 6 months with MRI.    Plan:  Follow-up 6 months with renal MRI    Orders  Orders Placed This Encounter   Procedures     MR Renal wo & w Contrast     Basic metabolic panel       Video-Visit Details    Type of service:  Video Visit    Video Start Time: 2:58 PM  Video End Time: 3:11 PM    Originating Location (pt. Location): Home    Distant Location (provider location):  [facility] UROLOGY AND [facility] FOR PROSTATE AND UROLOGIC CANCERS    Platform used for Video Visit: Guanakito Vivas MD  Urology  [facility]

## 2020-12-24 RX ORDER — DEXTROSE MONOHYDRATE 25 G/50ML
25-50 INJECTION, SOLUTION INTRAVENOUS
Status: CANCELLED | OUTPATIENT
Start: 2020-12-24

## 2020-12-24 RX ORDER — NICOTINE POLACRILEX 4 MG
15-30 LOZENGE BUCCAL
Status: CANCELLED | OUTPATIENT
Start: 2020-12-24

## 2020-12-26 DIAGNOSIS — Z11.59 ENCOUNTER FOR SCREENING FOR OTHER VIRAL DISEASES: ICD-10-CM

## 2020-12-26 LAB
SARS-COV-2 RNA SPEC QL NAA+PROBE: NOT DETECTED
SPECIMEN SOURCE: NORMAL

## 2020-12-26 PROCEDURE — U0003 INFECTIOUS AGENT DETECTION BY NUCLEIC ACID (DNA OR RNA); SEVERE ACUTE RESPIRATORY SYNDROME CORONAVIRUS 2 (SARS-COV-2) (CORONAVIRUS DISEASE [COVID-19]), AMPLIFIED PROBE TECHNIQUE, MAKING USE OF HIGH THROUGHPUT TECHNOLOGIES AS DESCRIBED BY CMS-2020-01-R: HCPCS | Performed by: ORTHOPAEDIC SURGERY

## 2020-12-27 NOTE — PROGRESS NOTES
"Anderson Mitchell is a 60 year old male who is being evaluated via a billable video visit.       Video Visit Technology for this patient: No video technology available to patient, please call patient over the phone   [phone]          The patient has been notified of following:      \"This video visit will be conducted via a call between you and your physician/provider. We have found that certain health care needs can be provided without the need for an in-person physical exam.  This service lets us provide the care you need with a video conversation.  If a prescription is necessary we can send it directly to your pharmacy.  If lab work is needed we can place an order for that and you can then stop by our lab to have the test done at a later time.     Video visits are billed at different rates depending on your insurance coverage.  Please reach out to your insurance provider with any questions.     If during the course of the call the physician/provider feels a video visit is not appropriate, you will not be charged for this service.\"     Patient has given verbal consent for Video visit? Yes  How would you like to obtain your AVS? MyChart  If you are dropped from the video visit, the video invite should be resent to: Text to cell phone: [phone]     Will anyone else be joining your video visit? No          Chief Complaint:   Left Renal Mass         Consult or Referral:     Mr. Anderson Mitchell is a 60 year old male evaluated at the request of Dr. Bradley.         History of Present Illness:    Anderson Mitchell is a very pleasant 60 year old male who presents with a history of left renal mass/complex cyst. This was noted incidentally on recent CT. No abd or flank pain. No hematuria.    ##RENAL MASS INITIAL ENCOUNTER##     Anderson Mitchell is a very pleasant 60 year old male who presents with a history of a Cystic renal lesion/mass.  This was discovered incidentally.  Initially diagnosed " about 2 week(s) ago.  The lesion is cystic ( Category 2 (few thin septa, hyperdense cyst <3 cm, well marginated).  The maximal dimension of the renal lesion is <1 centimeters and located on the left side.      Concerning  his renal function, his last SCr is   Lab Results   Component Value Date    CR 1.22 05/07/2013   .  he has the following risk factors for development of chronic kidney disease none.       he does have a history of previous abdominal or retroperitoneal surgery.  There is not a family history of renal cell cancer.  The patient has a history of smoking and currently is not smoking.    The mass/lesion is located in the Left side and is primarily located in the lower pole.  The tumor is also primarily endophytic.  The mass/lesion primarily lies on the anterior surface.  With regard to the collecting system, the edge of the tumor arrives more than 7 mm from the collecting system.    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          Past Surgical History:     Past Surgical History:   Procedure Laterality Date     ABDOMEN SURGERY       COLONOSCOPY  4/6/2012    Procedure:COLONOSCOPY; Surgeon:TAMIE REYNA; Location: GI     ESOPHAGOSCOPY, GASTROSCOPY, DUODENOSCOPY (EGD), COMBINED N/A 7/30/2020    Procedure: ESOPHAGOGASTRODUODENOSCOPY, WITH BIOPSY;  Surgeon: Tejas Bradley MD;  Location: [facility] OR     ORTHOPEDIC SURGERY            Medications     Current Outpatient Medications   Medication     cetirizine (ZYRTEC) 10 MG tablet     fluticasone (FLONASE) 50 MCG/ACT nasal spray     Multiple Vitamin  (MULTIVITAMIN) per tablet     rabeprazole (ACIPHEX) 20 MG tablet     st pittman wort 300 MG TABS tablet     VERAPAMIL HCL PO     hydrochlorothiazide (HYDRODIURIL) 25 MG tablet     No current facility-administered medications for this visit.             Family History:     Family History   Problem Relation Age of Onset     Hypertension Father         Descease     Respiratory Father         YUNG     Cerebrovascular Disease Father         ,     C.A.D. Father      Respiratory Brother         YUNG     Hypertension Brother      Thyroid Disease Mother         descease     Hypertension Brother      No known family history of  malignancy.         Social History:     Social History     Socioeconomic History     Marital status: Single     Spouse name: Not on file     Number of children: Not on file     Years of education: Not on file     Highest education level: Not on file   Occupational History     Not on file   Social Needs     Financial resource strain: Not on file     Food insecurity     Worry: Not on file     Inability: Not on file     Transportation needs     Medical: Not on file     Non-medical: Not on file   Tobacco Use     Smoking status: Former Smoker     Packs/day: 0.00     Years: 0.00     Pack years: 0.00     Types: Cigars     Smokeless tobacco: Never Used     Tobacco comment: OCC. CIGAR   Substance and Sexual Activity     Alcohol use: Yes     Alcohol/week: 0.0 standard drinks     Comment: 2 a week     Drug use: No     Sexual activity: Never   Lifestyle     Physical activity     Days per week: Not on file     Minutes per session: Not on file     Stress: Not on file   Relationships     Social connections     Talks on phone: Not on file     Gets together: Not on file     Attends [employer] service: Not on file     Active member of club or organization: Not on file     Attends meetings of clubs or organizations: Not on file     Relationship status: Not on file     Intimate partner violence     Fear of current or ex  partner: Not on file     Emotionally abused: Not on file     Physically abused: Not on file     Forced sexual activity: Not on file   Other Topics Concern     Parent/sibling w/ CABG, MI or angioplasty before 65F 55M? Not Asked   Social History Narrative     Not on file            Allergies:   Cats, Nkda [no known drug allergies], No clinical screening - see comments, and Nuts         Review of Systems:  From intake questionnaire     Skin: negative  Eyes: negative  Ears/Nose/Throat: negative  Respiratory: No shortness of breath, dyspnea on exertion, cough, or hemoptysis  Cardiovascular: No chest pain or palpitations  Gastrointestinal: negative; no nausea/vomiting, constipation or diarrhea  Genitourinary: as per HPI  Musculoskeletal: negative  Neurologic: negative  Psychiatric: negative  Hematologic/Lymphatic/Immunologic: negative  Endocrine: negative         Physical Exam:     Patient is a 60 year old  male   Vitals: There were no vitals taken for this visit.  Constitutional: There is no height or weight on file to calculate BMI.  Alert, no acute distress, oriented, conversant  Eyes: no scleral icterus; extraocular muscles intact  Respiratory: no respiratory distress, or pursed lip breathing  Musculoskeletal: normal range of motion  Skin: no notable lesions visible  Neuro: Alert, oriented, speech and mentation normal  Psych: affect and mood normal, alert and oriented to person, place and time      Labs and Pathology:    I reviewed all applicable laboratory and pathology data and went over findings with patient  Significant for   Lab Results   Component Value Date    CR 1.22 05/07/2013    CR 1.18 05/08/2012    CR 1.22 11/02/2011    CR 1.33 05/02/2011    CR 1.15 11/03/2010    CR 1.12 05/03/2010    CR 1.16 12/10/2009    CR 1.15 09/03/2009    CR 1.13 06/04/2009    CR 1.21 03/09/2009       Imaging:    I directly visualized and reviewed all applicable imaging a with patient.    CT abd/pelvis 12/10/2020   IMPRESSION:  In  this patient with a history of GIST and partial  gastrectomy: No evidence for disease recurrence.  1. Incidentally noted 7 mm left renal inferior pole nonsimple cyst  which has grown since 2/9/2016. Differential proteinaceous cyst versus  renal cell carcinoma. Recommend MRI for further evaluation or urologic  consultation.      Outside and Past Medical records:    I spent 10 minutes reviewing outside and past medical records.         Assessment and Plan:     Assessment: 60 year old male with 7 mm hyperdense, indeterminate renal cyst in the lower pole of the left kidney. This was discovered incidentally. We had an extensive discussion about the significance of a localized, enhancing kidney mass.  We discussed that approximately 80% of enhancing renal masses turn out to be kidney cancer upon removal, while 20% are found to be benign.  Although certain features such as size, gender and tumor characteristics can change these risks.    We discussed the role of renal mass biopsy including the fact that renal mass biopsy is safe with current techniques, it can be inaccurate and it can be non-diagnostic.  Furthermore, the majority of patients find they ultimately need to proceed with treatment anyway.      We discussed the options for treatment of a localized kidney mass including active surveillance, thermal ablation, and surgical extirpation.  Surgical removal has the best track record and close to a 99% chance of cure for T1a lesions compared to 90% cure with thermal ablation and is generally preferred.  Furthermore, thermal ablation is not optimal for larger masses or centrally located masses.      We discussed the role of observation and the low risk of metastases associated with lesions less than 2-3 cm in size and the potential for slow/stable growth in many cases.  However, the unpredictable natural history of these lesions was mentioned as a drawback with this approach and the lack of effective therapy in the event  of systemic progression.  In general, this approach is most favored for those with limited life expectancy and/or those with indeterminate (< 2-3 cm) renal masses.    We briefly discussed the role of surgery to address renal masses, but in this circumstance, the current mass is small in indeterminate in nature. As such, a period of surveillance would be reasonable. Will plan to obtain MRI in 6 months to better assess nature of this lesion and evaluate interval growth. He is agreeable with the plan for surveillance and will follow-up with me in 6 months with MRI.    Plan:  Follow-up 6 months with renal MRI    Orders  Orders Placed This Encounter   Procedures     MR Renal wo & w Contrast     Basic metabolic panel       Video-Visit Details    Type of service:  Video Visit    Video Start Time: 2:58 PM  Video End Time: 3:11 PM    Originating Location (pt. Location): Home    Distant Location (provider location):  [facility] UROLOGY AND [facility] FOR PROSTATE AND UROLOGIC CANCERS    Platform used for Video Visit: Guanakito Vivas MD  Urology  [facility]

## 2020-12-28 ENCOUNTER — TELEPHONE (OUTPATIENT)
Dept: MEDSURG UNIT | Facility: CLINIC | Age: 60
End: 2020-12-28

## 2020-12-28 NOTE — TELEPHONE ENCOUNTER
Pre-Procedure Negative COVID Test Results    Results Reviewed  The patient has a negative COVID test result within the required timeframe for the scheduled procedure.     No COVID pre-call needed.     Aure Dong RN

## 2020-12-29 ENCOUNTER — HOSPITAL ENCOUNTER (OUTPATIENT)
Dept: GENERAL RADIOLOGY | Facility: CLINIC | Age: 60
End: 2020-12-29
Attending: ORTHOPAEDIC SURGERY
Payer: COMMERCIAL

## 2020-12-29 VITALS — HEART RATE: 69 BPM | DIASTOLIC BLOOD PRESSURE: 94 MMHG | OXYGEN SATURATION: 100 % | SYSTOLIC BLOOD PRESSURE: 150 MMHG

## 2020-12-29 DIAGNOSIS — M25.572 PAIN IN JOINT INVOLVING ANKLE AND FOOT, LEFT: ICD-10-CM

## 2020-12-29 PROCEDURE — 77002 NEEDLE LOCALIZATION BY XRAY: CPT

## 2020-12-29 PROCEDURE — 250N000009 HC RX 250: Performed by: PHYSICIAN ASSISTANT

## 2020-12-29 PROCEDURE — 250N000011 HC RX IP 250 OP 636: Performed by: PHYSICIAN ASSISTANT

## 2020-12-29 PROCEDURE — 255N000002 HC RX 255 OP 636: Performed by: PHYSICIAN ASSISTANT

## 2020-12-29 RX ORDER — ETHYL CHLORIDE 100 %
1 AEROSOL, SPRAY (ML) TOPICAL ONCE
Status: COMPLETED | OUTPATIENT
Start: 2020-12-29 | End: 2020-12-29

## 2020-12-29 RX ORDER — LIDOCAINE HYDROCHLORIDE 10 MG/ML
5 INJECTION, SOLUTION EPIDURAL; INFILTRATION; INTRACAUDAL; PERINEURAL ONCE
Status: COMPLETED | OUTPATIENT
Start: 2020-12-29 | End: 2020-12-29

## 2020-12-29 RX ORDER — IOPAMIDOL 408 MG/ML
10 INJECTION, SOLUTION INTRATHECAL ONCE
Status: COMPLETED | OUTPATIENT
Start: 2020-12-29 | End: 2020-12-29

## 2020-12-29 RX ORDER — LIDOCAINE HYDROCHLORIDE 10 MG/ML
30 INJECTION, SOLUTION EPIDURAL; INFILTRATION; INTRACAUDAL; PERINEURAL ONCE
Status: COMPLETED | OUTPATIENT
Start: 2020-12-29 | End: 2020-12-29

## 2020-12-29 RX ORDER — TRIAMCINOLONE ACETONIDE 40 MG/ML
40 INJECTION, SUSPENSION INTRA-ARTICULAR; INTRAMUSCULAR ONCE
Status: COMPLETED | OUTPATIENT
Start: 2020-12-29 | End: 2020-12-29

## 2020-12-29 RX ADMIN — LIDOCAINE HYDROCHLORIDE 2 ML: 10 INJECTION, SOLUTION EPIDURAL; INFILTRATION; INTRACAUDAL; PERINEURAL at 10:41

## 2020-12-29 RX ADMIN — TRIAMCINOLONE ACETONIDE 40 MG: 40 INJECTION, SUSPENSION INTRA-ARTICULAR; INTRAMUSCULAR at 10:41

## 2020-12-29 RX ADMIN — IOPAMIDOL 1 ML: 408 INJECTION, SOLUTION INTRATHECAL at 10:40

## 2020-12-29 RX ADMIN — LIDOCAINE HYDROCHLORIDE 4 ML: 10 INJECTION, SOLUTION EPIDURAL; INFILTRATION; INTRACAUDAL; PERINEURAL at 10:09

## 2020-12-29 RX ADMIN — Medication 2 APPLICATION.: at 10:08

## 2020-12-29 NOTE — IP AVS SNAPSHOT
[address] [address]  Phone: [phone]                                    After Visit Summary   12/29/2020    Anderson Mitchell    MRN: [id number]           After Visit Summary Signature Page    I have received my discharge instructions, and my questions have been answered. I have discussed any challenges I see with this plan with the nurse or doctor.    ..........................................................................................................................................  Patient/Patient Representative Signature      ..........................................................................................................................................  Patient Representative Print Name and Relationship to Patient    ..................................................               ................................................  Date                                   Time    ..........................................................................................................................................  Reviewed by Signature/Title    ...................................................              ..............................................  Date                                               Time          22EPIC Rev 08/18

## 2020-12-29 NOTE — DISCHARGE INSTRUCTIONS
Orthopedic Discharge Instructions: After Your Injection  ________________________________________    Patient Name:  Anderson Mitchell  Today's Date:  December 29, 2020  The provider who performed your Left Ankle Joint Injection was _______________ at [facility]) in the Radiology Department.  Care of needle site    If you have new numbness down your leg, this may last up to 6 hours, but it should go away. You may need help with walking until your leg feels normal.     Over the next 24 to 48 hours, pain at the needle site may increase before it gets better.     For the next 48 hours, use ice packs for 15 minutes, three to four times a day for pain.    If you have a bandage, you may remove it the next morning.     No tub baths, hot tubs or swimming for 48 hours. You may shower the next day.   Activity    Do not drive until morning.     Limit your activity based on your pain level. Follow your doctor s orders for activity.     You may eat a normal diet.     If you had sedation,   - You may feel sleepy, forgetful or unsteady.   - Do not drink alcohol for 24 hours.  Medicines    If you take aspirin or platelet inhibitors, you can restart them tomorrow.     Restart all other medicines today at your regular dose, including Coumadin (warfarin).    If you are restarting Coumadin, talk to your doctor about having your INR checked.   If you had a steroid shot     The medicine should help reduce swelling and pain. This may take from 7 to 10 days.     Side effects from the shot will be mild and go away in 2 to 3 days. Common side effects may include:  -  Insomnia (trouble sleeping).  -  Heartburn.  -  Flushed face.  -  Water retention (bloating or fluid build-up).  -  Increased appetite (feeling more hungry than usual).  -  Increased blood sugar.  If you have diabetes, watch your blood sugar closely. If needed, call your doctor to help you control your blood sugar.  Some patients will get lasting relief from a single  shot. Others may require up to three shots to get results. If you have more than one steroid shot, they should be given two weeks apart.  Some patients do not have relief of symptoms.    Follow-up:  No follow-up needed     Call your doctor or go to the Emergency Room if you have severe pain, fever or problems with bowel or bladder control.

## 2021-01-15 ENCOUNTER — HEALTH MAINTENANCE LETTER (OUTPATIENT)
Age: 61
End: 2021-01-15

## 2021-02-03 ENCOUNTER — TRANSFERRED RECORDS (OUTPATIENT)
Dept: HEALTH INFORMATION MANAGEMENT | Facility: CLINIC | Age: 61
End: 2021-02-03

## 2021-02-08 ENCOUNTER — TRANSFERRED RECORDS (OUTPATIENT)
Dept: HEALTH INFORMATION MANAGEMENT | Facility: CLINIC | Age: 61
End: 2021-02-08

## 2021-02-10 ENCOUNTER — TRANSFERRED RECORDS (OUTPATIENT)
Dept: HEALTH INFORMATION MANAGEMENT | Facility: CLINIC | Age: 61
End: 2021-02-10

## 2021-02-12 ENCOUNTER — MEDICAL CORRESPONDENCE (OUTPATIENT)
Dept: HEALTH INFORMATION MANAGEMENT | Facility: CLINIC | Age: 61
End: 2021-02-12

## 2021-02-15 ENCOUNTER — TRANSCRIBE ORDERS (OUTPATIENT)
Dept: OTHER | Age: 61
End: 2021-02-15

## 2021-02-15 DIAGNOSIS — R20.0 NUMBNESS: Primary | ICD-10-CM

## 2021-03-01 ASSESSMENT — ENCOUNTER SYMPTOMS
INCREASED ENERGY: 0
TREMORS: 0
DISTURBANCES IN COORDINATION: 0
MUSCLE CRAMPS: 1
HALLUCINATIONS: 0
ARTHRALGIAS: 1
WEAKNESS: 1
DECREASED APPETITE: 0
HEADACHES: 0
ALTERED TEMPERATURE REGULATION: 0
MUSCLE WEAKNESS: 1
POLYDIPSIA: 1
NUMBNESS: 1
STIFFNESS: 0
JOINT SWELLING: 0
POLYPHAGIA: 0
FEVER: 0
SEIZURES: 0
TINGLING: 1
WEIGHT LOSS: 1
DIZZINESS: 0
NECK PAIN: 0
WEIGHT GAIN: 1
FATIGUE: 0
MEMORY LOSS: 0
LOSS OF CONSCIOUSNESS: 0
PARALYSIS: 0
SPEECH CHANGE: 0
NIGHT SWEATS: 0
MYALGIAS: 0
CHILLS: 0
BACK PAIN: 0

## 2021-03-01 NOTE — NURSING NOTE
"Reason For Visit:   Chief Complaint   Patient presents with     Consult     Left chip fracture. Pt stated he walked over a chip about a year ago and landed wrong. CT scan on 2-.        Primary MD: Koons, Duane M  Referring:   CAIO HUERTA    Age: 56 year old    ?  No      Ht 1.84 m (6' 0.44\")  Wt 97.3 kg (214 lb 9.6 oz)  BMI 28.75 kg/m2      Pain Assessment  Patient Currently in Pain: Yes  0-10 Pain Scale: 6  Primary Pain Location: Ankle  Pain Orientation: Left  Pain Descriptors: Aching (intermittent)  Alleviating Factors:  (Nothing)  Aggravating Factors: Standing (for long periods of time. )                   Current Outpatient Prescriptions   Medication Sig Dispense Refill     VERAPAMIL HCL PO        rabeprazole (ACIPHEX) 20 MG tablet Take 3 tablets by mouth daily.       FEXOFENADINE HCL PO Take 1 tablet by mouth daily.       fluticasone (FLONASE) 50 MCG/ACT nasal spray 2 sprays by Both Nostrils route daily.       hydrochlorothiazide (HYDRODIURIL) 25 MG tablet Take 1 tablet by mouth daily.       Multiple Vitamin (MULTIVITAMIN) per tablet Take 1 tablet by mouth daily.         Allergies   Allergen Reactions     Nkda [No Known Drug Allergies]        " complains of pain/discomfort

## 2021-03-10 ENCOUNTER — PRE VISIT (OUTPATIENT)
Dept: NEUROLOGY | Facility: CLINIC | Age: 61
End: 2021-03-10

## 2021-03-10 NOTE — TELEPHONE ENCOUNTER
FUTURE VISIT INFORMATION      FUTURE VISIT INFORMATION:    Date: 3/15/2021    Time: 830am    [place] [facility] [place]ERRAL INFORMATION:    Referring provider:  Rico Resendiz     Referring providers clinic:  [facility]     Reason for visit/diagnosis  Numbness     RECORDS REQUESTED FROM:       Clinic name Comments Records Status Imaging Status   [facility]   Scanned to Chart N/A

## 2021-03-15 ENCOUNTER — OFFICE VISIT (OUTPATIENT)
Dept: NEUROLOGY | Facility: CLINIC | Age: 61
End: 2021-03-15
Attending: FAMILY MEDICINE
Payer: COMMERCIAL

## 2021-03-15 VITALS
OXYGEN SATURATION: 100 % | SYSTOLIC BLOOD PRESSURE: 146 MMHG | RESPIRATION RATE: 16 BRPM | DIASTOLIC BLOOD PRESSURE: 88 MMHG | HEART RATE: 54 BPM

## 2021-03-15 DIAGNOSIS — G62.9 NEUROPATHY: Primary | ICD-10-CM

## 2021-03-15 DIAGNOSIS — G62.9 NEUROPATHY: ICD-10-CM

## 2021-03-15 LAB
ALBUMIN SERPL-MCNC: 3.8 G/DL (ref 3.4–5)
ALP SERPL-CCNC: 61 U/L (ref 40–150)
ALT SERPL W P-5'-P-CCNC: 42 U/L (ref 0–70)
AST SERPL W P-5'-P-CCNC: 28 U/L (ref 0–45)
B BURGDOR IGG+IGM SER QL: 0.04 (ref 0–0.89)
BILIRUB DIRECT SERPL-MCNC: 0.2 MG/DL (ref 0–0.2)
BILIRUB SERPL-MCNC: 0.5 MG/DL (ref 0.2–1.3)
DEPRECATED CALCIDIOL+CALCIFEROL SERPL-MC: 42 UG/L (ref 20–75)
PROT SERPL-MCNC: 7.5 G/DL (ref 6.8–8.8)

## 2021-03-15 PROCEDURE — 36415 COLL VENOUS BLD VENIPUNCTURE: CPT | Performed by: PATHOLOGY

## 2021-03-15 PROCEDURE — 84630 ASSAY OF ZINC: CPT | Mod: 90 | Performed by: PATHOLOGY

## 2021-03-15 PROCEDURE — 86618 LYME DISEASE ANTIBODY: CPT | Mod: 90 | Performed by: PATHOLOGY

## 2021-03-15 PROCEDURE — 83520 IMMUNOASSAY QUANT NOS NONAB: CPT | Mod: 90 | Performed by: PATHOLOGY

## 2021-03-15 PROCEDURE — 82525 ASSAY OF COPPER: CPT | Mod: 90 | Performed by: PATHOLOGY

## 2021-03-15 PROCEDURE — 99000 SPECIMEN HANDLING OFFICE-LAB: CPT | Performed by: PATHOLOGY

## 2021-03-15 PROCEDURE — 84165 PROTEIN E-PHORESIS SERUM: CPT | Mod: 90 | Performed by: PATHOLOGY

## 2021-03-15 PROCEDURE — 86255 FLUORESCENT ANTIBODY SCREEN: CPT | Mod: 90 | Performed by: PATHOLOGY

## 2021-03-15 PROCEDURE — 84446 ASSAY OF VITAMIN E: CPT | Mod: 90 | Performed by: PATHOLOGY

## 2021-03-15 PROCEDURE — 84207 ASSAY OF VITAMIN B-6: CPT | Mod: 90 | Performed by: PATHOLOGY

## 2021-03-15 PROCEDURE — 82306 VITAMIN D 25 HYDROXY: CPT | Mod: 90 | Performed by: PATHOLOGY

## 2021-03-15 PROCEDURE — 99205 OFFICE O/P NEW HI 60 MIN: CPT | Performed by: PSYCHIATRY & NEUROLOGY

## 2021-03-15 PROCEDURE — 83519 RIA NONANTIBODY: CPT | Mod: 90 | Performed by: PATHOLOGY

## 2021-03-15 PROCEDURE — 82784 ASSAY IGA/IGD/IGG/IGM EACH: CPT | Mod: 90 | Performed by: PATHOLOGY

## 2021-03-15 PROCEDURE — 86146 BETA-2 GLYCOPROTEIN ANTIBODY: CPT | Mod: 90 | Performed by: PATHOLOGY

## 2021-03-15 PROCEDURE — 86334 IMMUNOFIX E-PHORESIS SERUM: CPT | Mod: 90 | Performed by: PATHOLOGY

## 2021-03-15 PROCEDURE — 80076 HEPATIC FUNCTION PANEL: CPT | Performed by: PATHOLOGY

## 2021-03-15 RX ORDER — TRIAMCINOLONE ACETONIDE 55 UG/1
55 SPRAY, METERED NASAL
COMMUNITY
Start: 2021-03-05 | End: 2022-08-08

## 2021-03-15 ASSESSMENT — PAIN SCALES - GENERAL: PAINLEVEL: NO PAIN (0)

## 2021-03-15 NOTE — PATIENT INSTRUCTIONS
I suspect there is an ongoing neuropathy (nerve irritation) leading to your symptoms and further testing is needed:  - EMG of the upper extremities (both arms and hands) as well as one leg (Left)  - Serum studies to look for common causes of neuropathy that can come on relatively quickly

## 2021-03-15 NOTE — PROGRESS NOTES
[facility] Neurology Consultation    Anderson Mitchell MRN# [id number]   Age: 60 year old YOB: 1960     Requesting physician: Michael Steven Broton Koons, Duane M     Reason for Consultation: numbness      History of Presenting Symptoms:   Anderson Mitchell is a 60 year old male who presents today for evaluation of numbness.  The patient was seen with his PCP 2/3/2020 for multiple issues, but most specifically he was describign a 6 month history of right hand weakness.  The patient also noted numbness in his right hand after suffering a headaches over his left eye and left side of the head (lasted 24 hours).    The patient has pertinent medical history for HTN (treated with verapamil, chlorthalidone), and is s/p gastrectomy for gastrointestinal mass (5/2008).  Most recently (12/23/2020), he was seen with urology for a incidental finding of 7mm hyperdense, renal cystic mass that was enhancing and was to follow up with MRI in 6 months.    In 1/2015 the patient was seen with his PCP and was reporting 9 month history of paresthesias in his b/l hands and feet leading to tests being ordered; EMG was done 2/4/2015 showed chronic/remote L4-S1 radiculopathy in the clinical context of lumbosacral spinal stenosis.  MRI lumbar spine was ordered, but I see no imaging report or results upon chart review.    Today, the patient reports having numbness and tingling in his hands and feet for 4-5 months (possibly longer).  Symptom onset was quick, and initially on the medial surface of his second digit, then digit b/l.  There is tingling and itching in the hands on (palms and fingers).  The hand symptoms are all day long, and not changing over the last months.  There is burning that fluctuates on top of the other positive sensory symptoms.  No position makes his hand symptoms improve or worsen.  He hasn't tried any mediations to help his symptoms of the hands.  There is no discoloration of the hands or feet, and no rash.  The  patient has noted dropping items sometimes, and needs to concentrate on squeezing items as to not drop them.     At about a month later, the patient developed similar symptoms of paresthesias at the heels, plantar surface of foot, and balls of toes.  These symptoms are more of a fluctuation, versus continued through the day.  He has plantar fascitis, and this isn't necessarily the same thing.  The symptoms are mostly of numbness with tingling, with occasional burning. There is no shooting pain. No specific weakness is noted in the feet.      Past Medical History:     Past Medical History:   Diagnosis Date     Anxiety 2016    not Medicated     Arthritis     tr knee and both ankles     Chronic constipation NOW    from Re-flux med     Fracture 2016    lower lt ankle     Gastrointestinal stromal tumor (H) 5/5/2008     Hypertension      Malignant neoplasm (H)     gastrointestinal mass-removed 5-2008     Thalassaemia trait 5/7/2013      Past Surgical History:     Past Surgical History:   Procedure Laterality Date     ABDOMEN SURGERY       COLONOSCOPY  4/6/2012    Procedure:COLONOSCOPY; Surgeon:TAMIE REYNA; Location: GI     ESOPHAGOSCOPY, GASTROSCOPY, DUODENOSCOPY (EGD), COMBINED N/A 7/30/2020    Procedure: ESOPHAGOGASTRODUODENOSCOPY, WITH BIOPSY;  Surgeon: Tejas Bradley MD;  Location:  OR     ORTHOPEDIC SURGERY        Social History:     Tobacco Use     Smoking status: Former Smoker     Packs/day: 0.00     Years: 0.00     Pack years: 0.00     Types: Cigars     Smokeless tobacco: Never Used     Tobacco comment: OCC. CIGAR   Substance Use Topics     Alcohol use: Yes     Alcohol/week: 0.0 standard drinks     Comment: 2 a week     Drug use: No      Family History:     Family History   Problem Relation Age of Onset     Hypertension Father         Descease     Respiratory Father         YUNG     Cerebrovascular Disease Father         ,     C.A.D. Father      Respiratory Brother         YUNG      Hypertension Brother      Thyroid Disease Mother         descease     Hypertension Brother       Medications:     Current Outpatient Medications   Medication Sig     cetirizine (ZYRTEC) 10 MG tablet Take 10 mg by mouth daily     fluticasone (FLONASE) 50 MCG/ACT nasal spray 2 sprays by Both Nostrils route daily.     hydrochlorothiazide (HYDRODIURIL) 25 MG tablet Take 1 tablet by mouth daily.     Multiple Vitamin (MULTIVITAMIN) per tablet Take 1 tablet by mouth daily.     rabeprazole (ACIPHEX) 20 MG tablet Take 3 tablets by mouth daily.     st johns wort 300 MG TABS tablet Take 300 mg by mouth 3 times daily     VERAPAMIL HCL PO Take 240 mg by mouth       Allergies:     Allergies   Allergen Reactions     Cats Itching and Other (See Comments)     Nkda [No Known Drug Allergies]      No Clinical Screening - See Comments Other (See Comments)     Nuts Itching      Review of Systems:   A comprehensive 10 point review of systems (constitutional, ENT, cardiac, peripheral vascular, lymphatic, respiratory, GI, , Musculoskeletal, skin, Neurological) was performed and found to be negative except as described in this note.      Physical Exam:   Vitals: BP (!) 146/88   Pulse 54   Resp 16   SpO2 100%    General: Seated comfortably in no acute distress.  HEENT: Neck supple with normal range of motion. No paracervical muscle tenderness or tightness.    Heart: Regular rate  Lungs: breathing comfortably  GI: Non tender  Extremities: no edema, full pulses  Skin: No rashes but mottling with darkened spots throughout palms, soles of feet, and body is noted.  Neurologic:     Mental Status: Fully alert, attentive and oriented. Speech clear and fluent, no paraphasic errors.      Cranial Nerves: Visual fields intact. PERRL. EOMI with normal smooth pursuit. Facial sensation intact/symmetric. Facial movements symmetric. Hearing not formally tested but intact to conversation. Palate elevation symmetric, uvula midline. No dysarthria. Shoulder  shrug strong bilaterally. Tongue protrusion midline.     Motor: No tremors or other abnormal movements observed. Muscle tone normal throughout. No pronator drift. Normal/symmetric rapid finger tapping. Strength 5/5 throughout upper and lower extremities.     Deep Tendon Reflexes: 2+/symmetric throughout upper and lower extremities. No clonus. Toes downgoing bilaterally.     Sensory: Slight errors in reproting pinprick and light touch in distal toes (errors in reporting sensation) in both feet. Vibration 10 seconds b/l at toes. Error in reporting proprioception on left great toe. Otherwise sensations intact throughout b/l in upper and lower extremities. Sway with Romberg, but no falls.      Coordination: Finger-nose-finger intact without dysmetria. Rapid alternating movements intact/symmetric with normal speed and rhythm.     Gait: Normal, steady casual gait. Tandem is with some need of side-steps when done backward. Heel and toe walking is without som.         Data: Pertinent prior to visit   Imaging:  No major imaging to review (cannot see MRI cervical spine report or image, no lumbar image to review)    Laboratory:  A1c, ESR, TSH, B12, Folate - all normal         Assessment and Plan:   Assessment:  Distal symmetric sensory polyneuropathy    The patient has had a relatively acute onset of sensory positive symptoms that are non-progressive starting in his hands b/l, and now are in his hands and feet b/l.  Exam today is only revealing for slight proprioceptive loss in the left great toe, and slightly diminished vibration in the toes b/l.  Clinically, his initial sensory symptoms were in a median nerve distribution, but have since spread slightly out of this region b/l.  Given his history of gastrointestinal stromal tumor, and new renal mass that is being monitored, along with absence of heavy alcohol use or diabetes, I think further investigation into multiple causes of small and large fiber neuropathy is needed.   Although the patient has prior history of lumbosacral radiculopathy, his distribution and clinical presentation is not suggestive for a radicular compression and I do not think MRI lumbar spine would be that helpful.    I did not note any specific focal deficits consistent with a hemorrhagic stroke or ischemic stroke on exam today. While the patient did have a major headache around the onset of his symptoms, I do not feel strongly about obtaining an MRI brain to rule out possible stroke given his exam today.  If testing is unrevealing, then this test will be reconsidered along with other testing (small fiber neuropathy testing).  At our next visit, pending results, the patient and I will discuss possible symptomatic treatment if needed (consider gabapentin, Lyrica, duloxetine, other).     Plan:  EMG b/l hands, left leg  SPEP, Immunofixation, Copper, Zinc, Hepatic panel, B6, B1, Vitamin D, Vitamin E, ANCA, Beta 2 glycoprotein, Lyme, paraneoplastic panel     Follow up in Neurology clinic in one month  or earlier as needed should new concerns arise.    JERRELL Wallace D.O.   of Neurology    Total time (70 min) in this patient encounter was spent on precharting, counseling and/or coordination of care. We reviewed diagnostic results, impressions, and discussed other possible tests if symptoms do not improve. We discussed the implications of the diagnosis, as well as risks and benefits of management options. We reviewed treatment instructions and our scheduled follow-up as specified in the discharge plan. We also discussed the importance of compliance with the chosen course of treatment. The patient is in agreement with this plan and has no further questions.

## 2021-03-15 NOTE — LETTER
3/15/2021       RE: Anderson Mitchell  [address]  [place] [address]     Dear Colleague,    Thank you for referring your patient, Anderson Mitchell, to the [facility] NEUROLOGY CLIN[facility] [place] at [facility]. Please see a copy of my visit note below.    [facility] Neurology Consultation    Anderson Mitchell MRN# [id number]   Age: 60 year old YOB: 1960     Requesting physician: Michael Steven Broton Koons, Duane M     Reason for Consultation: numbness      History of Presenting Symptoms:   Anderson Mitchell is a 60 year old male who presents today for evaluation of numbness.  The patient was seen with his PCP 2/3/2020 for multiple issues, but most specifically he was describign a 6 month history of right hand weakness.  The patient also noted numbness in his right hand after suffering a headaches over his left eye and left side of the head (lasted 24 hours).    The patient has pertinent medical history for HTN (treated with verapamil, chlorthalidone), and is s/p gastrectomy for gastrointestinal mass (5/2008).  Most recently (12/23/2020), he was seen with urology for a incidental finding of 7mm hyperdense, renal cystic mass that was enhancing and was to follow up with MRI in 6 months.    In 1/2015 the patient was seen with his PCP and was reporting 9 month history of paresthesias in his b/l hands and feet leading to tests being ordered; EMG was done 2/4/2015 showed chronic/remote L4-S1 radiculopathy in the clinical context of lumbosacral spinal stenosis.  MRI lumbar spine was ordered, but I see no imaging report or results upon chart review.    Today, the patient reports having numbness and tingling in his hands and feet for 4-5 months (possibly longer).  Symptom onset was quick, and initially on the medial surface of his second digit, then digit b/l.  There is tingling and itching in the hands on (palms and fingers).  The hand  symptoms are all day long, and not changing over the last months.  There is burning that fluctuates on top of the other positive sensory symptoms.  No position makes his hand symptoms improve or worsen.  He hasn't tried any mediations to help his symptoms of the hands.  There is no discoloration of the hands or feet, and no rash.  The patient has noted dropping items sometimes, and needs to concentrate on squeezing items as to not drop them.     At about a month later, the patient developed similar symptoms of paresthesias at the heels, plantar surface of foot, and balls of toes.  These symptoms are more of a fluctuation, versus continued through the day.  He has plantar fascitis, and this isn't necessarily the same thing.  The symptoms are mostly of numbness with tingling, with occasional burning. There is no shooting pain. No specific weakness is noted in the feet.      Past Medical History:     Past Medical History:   Diagnosis Date     Anxiety 2016    not Medicated     Arthritis     tr knee and both ankles     Chronic constipation NOW    from Re-flux med     Fracture 2016    lower lt ankle     Gastrointestinal stromal tumor (H) 5/5/2008     Hypertension      Malignant neoplasm (H)     gastrointestinal mass-removed 5-2008     Thalassaemia trait 5/7/2013      Past Surgical History:     Past Surgical History:   Procedure Laterality Date     ABDOMEN SURGERY       COLONOSCOPY  4/6/2012    Procedure:COLONOSCOPY; Surgeon:TAMIE REYNA; Location: GI     ESOPHAGOSCOPY, GASTROSCOPY, DUODENOSCOPY (EGD), COMBINED N/A 7/30/2020    Procedure: ESOPHAGOGASTRODUODENOSCOPY, WITH BIOPSY;  Surgeon: Tejas Bradley MD;  Location:  OR     ORTHOPEDIC SURGERY        Social History:     Tobacco Use     Smoking status: Former Smoker     Packs/day: 0.00     Years: 0.00     Pack years: 0.00     Types: Cigars     Smokeless tobacco: Never Used     Tobacco comment: OCC. CIGAR   Substance Use Topics     Alcohol use: Yes      Alcohol/week: 0.0 standard drinks     Comment: 2 a week     Drug use: No      Family History:     Family History   Problem Relation Age of Onset     Hypertension Father         Descease     Respiratory Father         YUNG     Cerebrovascular Disease Father         ,     C.A.D. Father      Respiratory Brother         YUNG     Hypertension Brother      Thyroid Disease Mother         descease     Hypertension Brother       Medications:     Current Outpatient Medications   Medication Sig     cetirizine (ZYRTEC) 10 MG tablet Take 10 mg by mouth daily     fluticasone (FLONASE) 50 MCG/ACT nasal spray 2 sprays by Both Nostrils route daily.     hydrochlorothiazide (HYDRODIURIL) 25 MG tablet Take 1 tablet by mouth daily.     Multiple Vitamin (MULTIVITAMIN) per tablet Take 1 tablet by mouth daily.     rabeprazole (ACIPHEX) 20 MG tablet Take 3 tablets by mouth daily.     st johns wort 300 MG TABS tablet Take 300 mg by mouth 3 times daily     VERAPAMIL HCL PO Take 240 mg by mouth       Allergies:     Allergies   Allergen Reactions     Cats Itching and Other (See Comments)     Nkda [No Known Drug Allergies]      No Clinical Screening - See Comments Other (See Comments)     Nuts Itching      Review of Systems:   A comprehensive 10 point review of systems (constitutional, ENT, cardiac, peripheral vascular, lymphatic, respiratory, GI, , Musculoskeletal, skin, Neurological) was performed and found to be negative except as described in this note.      Physical Exam:   Vitals: BP (!) 146/88   Pulse 54   Resp 16   SpO2 100%    General: Seated comfortably in no acute distress.  HEENT: Neck supple with normal range of motion. No paracervical muscle tenderness or tightness.    Heart: Regular rate  Lungs: breathing comfortably  GI: Non tender  Extremities: no edema, full pulses  Skin: No rashes but mottling with darkened spots throughout palms, soles of feet, and body is noted.  Neurologic:     Mental Status: Fully alert, attentive  and oriented. Speech clear and fluent, no paraphasic errors.      Cranial Nerves: Visual fields intact. PERRL. EOMI with normal smooth pursuit. Facial sensation intact/symmetric. Facial movements symmetric. Hearing not formally tested but intact to conversation. Palate elevation symmetric, uvula midline. No dysarthria. Shoulder shrug strong bilaterally. Tongue protrusion midline.     Motor: No tremors or other abnormal movements observed. Muscle tone normal throughout. No pronator drift. Normal/symmetric rapid finger tapping. Strength 5/5 throughout upper and lower extremities.     Deep Tendon Reflexes: 2+/symmetric throughout upper and lower extremities. No clonus. Toes downgoing bilaterally.     Sensory: Slight errors in reproting pinprick and light touch in distal toes (errors in reporting sensation) in both feet. Vibration 10 seconds b/l at toes. Error in reporting proprioception on left great toe. Otherwise sensations intact throughout b/l in upper and lower extremities. Sway with Romberg, but no falls.      Coordination: Finger-nose-finger intact without dysmetria. Rapid alternating movements intact/symmetric with normal speed and rhythm.     Gait: Normal, steady casual gait. Tandem is with some need of side-steps when done backward. Heel and toe walking is without som.         Data: Pertinent prior to visit   Imaging:  No major imaging to review (cannot see MRI cervical spine report or image, no lumbar image to review)    Laboratory:  A1c, ESR, TSH, B12, Folate - all normal         Assessment and Plan:   Assessment:  Distal symmetric sensory polyneuropathy    The patient has had a relatively acute onset of sensory positive symptoms that are non-progressive starting in his hands b/l, and now are in his hands and feet b/l.  Exam today is only revealing for slight proprioceptive loss in the left great toe, and slightly diminished vibration in the toes b/l.  Clinically, his initial sensory symptoms were in a  median nerve distribution, but have since spread slightly out of this region b/l.  Given his history of gastrointestinal stromal tumor, and new renal mass that is being monitored, along with absence of heavy alcohol use or diabetes, I think further investigation into multiple causes of small and large fiber neuropathy is needed.  Although the patient has prior history of lumbosacral radiculopathy, his distribution and clinical presentation is not suggestive for a radicular compression and I do not think MRI lumbar spine would be that helpful.    I did not note any specific focal deficits consistent with a hemorrhagic stroke or ischemic stroke on exam today. While the patient did have a major headache around the onset of his symptoms, I do not feel strongly about obtaining an MRI brain to rule out possible stroke given his exam today.  If testing is unrevealing, then this test will be reconsidered along with other testing (small fiber neuropathy testing).  At our next visit, pending results, the patient and I will discuss possible symptomatic treatment if needed (consider gabapentin, Lyrica, duloxetine, other).     Plan:  EMG b/l hands, left leg  SPEP, Immunofixation, Copper, Zinc, Hepatic panel, B6, B1, Vitamin D, Vitamin E, ANCA, Beta 2 glycoprotein, Lyme, paraneoplastic panel     Follow up in Neurology clinic in one month  or earlier as needed should new concerns arise.    JERRELL Wallace D.O.   of Neurology    Total time (70 min) in this patient encounter was spent on precharting, counseling and/or coordination of care. We reviewed diagnostic results, impressions, and discussed other possible tests if symptoms do not improve. We discussed the implications of the diagnosis, as well as risks and benefits of management options. We reviewed treatment instructions and our scheduled follow-up as specified in the discharge plan. We also discussed the importance of compliance with the chosen course  of treatment. The patient is in agreement with this plan and has no further questions.

## 2021-03-16 ENCOUNTER — IMMUNIZATION (OUTPATIENT)
Dept: NURSING | Facility: CLINIC | Age: 61
End: 2021-03-16
Payer: COMMERCIAL

## 2021-03-16 LAB
ALBUMIN SERPL ELPH-MCNC: 4.5 G/DL (ref 3.7–5.1)
ALPHA1 GLOB SERPL ELPH-MCNC: 0.3 G/DL (ref 0.2–0.4)
ALPHA2 GLOB SERPL ELPH-MCNC: 0.6 G/DL (ref 0.5–0.9)
ANCA AB PATTERN SER IF-IMP: NORMAL
B-GLOBULIN SERPL ELPH-MCNC: 0.6 G/DL (ref 0.6–1)
B2 GLYCOPROT1 IGG SERPL IA-ACNC: 2.6 U/ML
B2 GLYCOPROT1 IGM SERPL IA-ACNC: <2.9 U/ML
C-ANCA TITR SER IF: NORMAL {TITER}
GAMMA GLOB SERPL ELPH-MCNC: 1.4 G/DL (ref 0.7–1.6)
IGA SERPL-MCNC: 55 MG/DL (ref 84–499)
IGG SERPL-MCNC: 1562 MG/DL (ref 610–1616)
IGM SERPL-MCNC: 35 MG/DL (ref 35–242)
M PROTEIN SERPL ELPH-MCNC: 0.8 G/DL
PROT PATTERN SERPL ELPH-IMP: ABNORMAL
PROT PATTERN SERPL IFE-IMP: ABNORMAL

## 2021-03-16 PROCEDURE — 0001A PR COVID VAC PFIZER DIL RECON 30 MCG/0.3 ML IM: CPT

## 2021-03-16 PROCEDURE — 91300 PR COVID VAC PFIZER DIL RECON 30 MCG/0.3 ML IM: CPT

## 2021-03-17 ENCOUNTER — OFFICE VISIT (OUTPATIENT)
Dept: NEUROLOGY | Facility: CLINIC | Age: 61
End: 2021-03-17
Attending: PSYCHIATRY & NEUROLOGY
Payer: COMMERCIAL

## 2021-03-17 DIAGNOSIS — G62.9 NEUROPATHY: ICD-10-CM

## 2021-03-17 DIAGNOSIS — R20.9 DISTURBANCE OF SKIN SENSATION: Primary | ICD-10-CM

## 2021-03-17 LAB
COPPER SERPL-MCNC: 83.8 UG/DL (ref 70–140)
VIT B6 SERPL-MCNC: 136 NMOL/L (ref 20–125)
ZINC SERPL-MCNC: 94 UG/DL (ref 60–120)

## 2021-03-17 PROCEDURE — 95912 NRV CNDJ TEST 11-12 STUDIES: CPT | Performed by: PSYCHIATRY & NEUROLOGY

## 2021-03-17 NOTE — LETTER
3/17/2021       RE: Anderson Mitchell  [address]  [place] [place] [facility] [address]     Dear Colleague,    Thank you for referring your patient, Anderson Mitchell, to the [facility] EMG CLINIC [place] at [facility]. Please see a copy of my visit note below.    [facility]  Electrodiagnostic Laboratory      Nerve Conduction & EMG Report        Patient:       Anderson Mitchell  Patient ID:    [id number]  Gender:        Male  YOB: 1960  Age:           60 Years 9 Months        History & Examination:  Anderson Mitchell is a 60 year old man with symmetric acral paresthesias. Examination demonstrates preserved strength and reflexes. He is referred for evaluation of possible polyneuropathy or entrapment neuropathies.      Techniques:   Motor conduction studies were done with surface recording electrodes. Sensory conduction studies were performed with surface electrodes, unless indicated otherwise by (n), designating the use of subdermal recording electrodes. Temperature was monitored and recorded throughout the study. Upper extremities were maintained at a temperature of 32 degrees Centigrade or higher.  Lower extremities were maintained at a temperature of 31degrees Centigrade or higher. EMG was done with a concentric needle electrode.        Results:  Left sural, superficial peroneal, median, ulnar, and radial sensory conduction studies were normal. Left peroneal, tibial, median, and ulnar motor conduction studies were normal.     Interpretation:  This is a normal study. In particular, there is no evidence of large-fiber polyneuropathy or entrapment neuropathy.      William Kaplan M.D.         Sensory NCS      Nerve / Sites Rec. Site Onset Peak Ref. NP Amp Ref. PP Amp Dist Charles Ref. Temp     ms ms ms  V  V  V cm m/s m/s  C   L MEDIAN - Dig II Anti      Wrist Dig II 2.66 3.75  16.3 10.0 25.0 14 52.7 48.0 32   L ULNAR - Dig V Anti      Wrist  Dig V 2.60 3.33  17.2 8.0 52.9 12.5 48.0 48.0 32   L RADIAL - Snuff      Forearm Snuff 2.14 2.66  23.7 15.0 27.9 12.5 58.5 48.0 32.5   L SURAL - Lat Mall      Calf Ankle 3.02 3.80  8.3 8.0 10.8 14 46.3 38.0 31.1      Calf Ankle 2.92 3.59  9.0 8.0 12.1 14 48.0  31.2   L SUP PERONEAL      Lat Leg Ware 2.86 3.75  8.5  9.5 12.5 43.6 38.0 31.6   L MEDIAN - Ulnar - Palmar      Median Wrist 1.61 2.14 2.40 30.4  35.6 8 49.5  32.4      Ulnar Wrist 1.56 2.14 2.40 10.7  11.8 8 51.2  32.2       Motor NCS      Nerve / Sites Rec. Site Lat Ref. Amp Ref. Rel Amp Dist Charles Ref. Dur. Area Temp.     ms ms mV mV % cm m/s m/s ms %  C   L MEDIAN - APB      Wrist APB 4.27 4.40 6.6 5.0 100 8   7.60 100 32.4      Elbow APB 8.70  6.5  98.2 25 56.5 48.0 7.55 91.3 32.4   L ULNAR - ADM      Wrist ADM 3.13 3.50 11.0 5.0 100 8   6.41 100 32.4      B.Elbow ADM 6.77  10.4  94.8 24 65.8 48.0 7.03 94.1 32.4      A.Elbow ADM 8.44  10.0  91.4 10 60.0 48.0 7.34 94.2 32.2   L DEEP PERONEAL - EDB      Ankle EDB 3.33 6.00 5.1 2.5 100 8   5.94 100 31.1      FibHead EDB 11.04  3.8  73.9 37 48.0 38.0 6.61 84.3 31.1      Pop Fos EDB 13.28  2.5  49.8 12 53.6 38.0 6.67 64 31.1   L TIBIAL - AH      Ankle AH 4.17 6.00 5.6 4.0 100 8   7.29 100 31      Pop Fos AH 13.54  2.9  51.3 44 46.9 38.0 8.18 74.9 30.8   L MEDIAN - II Lumb      Median II Lumb 3.70  1.5  100 10   7.86 100 32.2      Ulnar Palm Int 3.80  8.7  577 10   6.61 305 32.2   L PERONEAL - Tib Ant      Fib Head Tib Ant 2.81  10.1  100 12   11.88 100 31.1      Knee Tib Ant 4.69  10.1  99.7 10 53.3  11.98 102 31.6       F  Wave      Nerve Min F Lat Max F Lat Mean FLat Temp.    ms ms ms  C   L TIBIAL 57.81 61.56 59.45 30.8                                 Again, thank you for allowing me to participate in the care of your patient.      Sincerely,    William Kaplan MD

## 2021-03-17 NOTE — PROGRESS NOTES
[facility]  Electrodiagnostic Laboratory    Nerve Conduction & EMG Report          Patient:       Anderson Mitchell  Patient ID:    [id number]  Gender:        Male  YOB: 1960  Age:           60 Years 9 Months        History & Examination:  Anderson Mitchell is a 60 year old man with symmetric acral paresthesias. Examination demonstrates preserved strength and reflexes. He is referred for evaluation of possible polyneuropathy or entrapment neuropathies.      Techniques:   Motor conduction studies were done with surface recording electrodes. Sensory conduction studies were performed with surface electrodes, unless indicated otherwise by (n), designating the use of subdermal recording electrodes. Temperature was monitored and recorded throughout the study. Upper extremities were maintained at a temperature of 32 degrees Centigrade or higher.  Lower extremities were maintained at a temperature of 31degrees Centigrade or higher. EMG was done with a concentric needle electrode.        Results:  Left sural, superficial peroneal, median, ulnar, and radial sensory conduction studies were normal. Left peroneal, tibial, median, and ulnar motor conduction studies were normal.     Interpretation:  This is a normal study. In particular, there is no evidence of large-fiber polyneuropathy or entrapment neuropathy.      William Kaplan M.D.         Sensory NCS      Nerve / Sites Rec. Site Onset Peak Ref. NP Amp Ref. PP Amp Dist Charles Ref. Temp     ms ms ms  V  V  V cm m/s m/s  C   L MEDIAN - Dig II Anti      Wrist Dig II 2.66 3.75  16.3 10.0 25.0 14 52.7 48.0 32   L ULNAR - Dig V Anti      Wrist Dig V 2.60 3.33  17.2 8.0 52.9 12.5 48.0 48.0 32   L RADIAL - Snuff      Forearm Snuff 2.14 2.66  23.7 15.0 27.9 12.5 58.5 48.0 32.5   L SURAL - Lat Mall      Calf Ankle 3.02 3.80  8.3 8.0 10.8 14 46.3 38.0 31.1      Calf Ankle 2.92 3.59  9.0 8.0 12.1 14 48.0  31.2   L SUP PERONEAL      Lat Leg Ware 2.86 3.75  8.5  9.5 12.5  43.6 38.0 31.6   L MEDIAN - Ulnar - Palmar      Median Wrist 1.61 2.14 2.40 30.4  35.6 8 49.5  32.4      Ulnar Wrist 1.56 2.14 2.40 10.7  11.8 8 51.2  32.2       Motor NCS      Nerve / Sites Rec. Site Lat Ref. Amp Ref. Rel Amp Dist Charles Ref. Dur. Area Temp.     ms ms mV mV % cm m/s m/s ms %  C   L MEDIAN - APB      Wrist APB 4.27 4.40 6.6 5.0 100 8   7.60 100 32.4      Elbow APB 8.70  6.5  98.2 25 56.5 48.0 7.55 91.3 32.4   L ULNAR - ADM      Wrist ADM 3.13 3.50 11.0 5.0 100 8   6.41 100 32.4      B.Elbow ADM 6.77  10.4  94.8 24 65.8 48.0 7.03 94.1 32.4      A.Elbow ADM 8.44  10.0  91.4 10 60.0 48.0 7.34 94.2 32.2   L DEEP PERONEAL - EDB      Ankle EDB 3.33 6.00 5.1 2.5 100 8   5.94 100 31.1      FibHead EDB 11.04  3.8  73.9 37 48.0 38.0 6.61 84.3 31.1      Pop Fos EDB 13.28  2.5  49.8 12 53.6 38.0 6.67 64 31.1   L TIBIAL - AH      Ankle AH 4.17 6.00 5.6 4.0 100 8   7.29 100 31      Pop Fos AH 13.54  2.9  51.3 44 46.9 38.0 8.18 74.9 30.8   L MEDIAN - II Lumb      Median II Lumb 3.70  1.5  100 10   7.86 100 32.2      Ulnar Palm Int 3.80  8.7  577 10   6.61 305 32.2   L PERONEAL - Tib Ant      Fib Head Tib Ant 2.81  10.1  100 12   11.88 100 31.1      Knee Tib Ant 4.69  10.1  99.7 10 53.3  11.98 102 31.6       F  Wave      Nerve Min F Lat Max F Lat Mean FLat Temp.    ms ms ms  C   L TIBIAL 57.81 61.56 59.45 30.8

## 2021-03-18 LAB
A-TOCOPHEROL VIT E SERPL-MCNC: 9.5 MG/L (ref 5.5–18)
BETA+GAMMA TOCOPHEROL SERPL-MCNC: 1 MG/L (ref 0–6)

## 2021-03-23 ENCOUNTER — TELEPHONE (OUTPATIENT)
Dept: NEUROLOGY | Facility: CLINIC | Age: 61
End: 2021-03-23

## 2021-03-23 DIAGNOSIS — G62.9 NEUROPATHY: Primary | ICD-10-CM

## 2021-03-23 LAB — PNP ABY SERUM: NORMAL

## 2021-03-23 NOTE — TELEPHONE ENCOUNTER
Charan العلي and I spoke about his positive results regarding SPEP and Immunofixation, and I indicated further testing may be needed to define if he has a MGUS or other gammopathy causing a small fiber neuroapthy.  The patient was understanding that he would need to have further tests of his urine and serum to define this condition further, with a possible consult to hematology/oncology pending the results.    - light chains serum and urine  - total protein immunofixation urine    JERRELL Wallace D.O.  [facility] Neurology

## 2021-04-01 DIAGNOSIS — G62.9 NEUROPATHY: ICD-10-CM

## 2021-04-01 LAB
KAPPA LC UR-MCNC: 1.27 MG/DL (ref 0.33–1.94)
KAPPA LC/LAMBDA SER: 0.44 {RATIO} (ref 0.26–1.65)
LAMBDA LC SERPL-MCNC: 2.88 MG/DL (ref 0.57–2.63)

## 2021-04-01 PROCEDURE — 83883 ASSAY NEPHELOMETRY NOT SPEC: CPT | Mod: 90 | Performed by: PATHOLOGY

## 2021-04-01 PROCEDURE — 36415 COLL VENOUS BLD VENIPUNCTURE: CPT | Performed by: PATHOLOGY

## 2021-04-01 PROCEDURE — 99000 SPECIMEN HANDLING OFFICE-LAB: CPT | Performed by: PATHOLOGY

## 2021-04-02 DIAGNOSIS — G62.9 NEUROPATHY: ICD-10-CM

## 2021-04-02 LAB
COLLECT DURATION TIME UR: 24 H
SPECIMEN VOL UR: 1612 ML

## 2021-04-02 PROCEDURE — 99000 SPECIMEN HANDLING OFFICE-LAB: CPT | Performed by: PATHOLOGY

## 2021-04-02 PROCEDURE — 83883 ASSAY NEPHELOMETRY NOT SPEC: CPT | Mod: 90 | Performed by: PATHOLOGY

## 2021-04-02 PROCEDURE — 81050 URINALYSIS VOLUME MEASURE: CPT | Performed by: PATHOLOGY

## 2021-04-02 PROCEDURE — 86335 IMMUNFIX E-PHORSIS/URINE/CSF: CPT | Mod: 90 | Performed by: PATHOLOGY

## 2021-04-05 LAB
PROT ELPH PNL UR ELPH: NORMAL
TOTAL LIGHT CHAINS UR: NORMAL

## 2021-04-06 ENCOUNTER — IMMUNIZATION (OUTPATIENT)
Dept: NURSING | Facility: CLINIC | Age: 61
End: 2021-04-06
Attending: INTERNAL MEDICINE
Payer: COMMERCIAL

## 2021-04-06 PROCEDURE — 91300 PR COVID VAC PFIZER DIL RECON 30 MCG/0.3 ML IM: CPT

## 2021-04-06 PROCEDURE — 0002A PR COVID VAC PFIZER DIL RECON 30 MCG/0.3 ML IM: CPT

## 2021-04-16 ENCOUNTER — VIRTUAL VISIT (OUTPATIENT)
Dept: NEUROLOGY | Facility: CLINIC | Age: 61
End: 2021-04-16
Payer: COMMERCIAL

## 2021-04-16 DIAGNOSIS — G62.9 NEUROPATHY: Primary | ICD-10-CM

## 2021-04-16 PROCEDURE — 99214 OFFICE O/P EST MOD 30 MIN: CPT | Mod: TEL | Performed by: PSYCHIATRY & NEUROLOGY

## 2021-04-16 RX ORDER — GABAPENTIN 100 MG/1
CAPSULE ORAL
Qty: 126 CAPSULE | Refills: 0 | Status: SHIPPED | OUTPATIENT
Start: 2021-04-16 | End: 2021-07-15

## 2021-04-16 RX ORDER — GABAPENTIN 300 MG/1
300 CAPSULE ORAL 3 TIMES DAILY
Qty: 120 CAPSULE | Refills: 3 | Status: SHIPPED | OUTPATIENT
Start: 2021-05-06 | End: 2021-07-15

## 2021-04-16 NOTE — PROGRESS NOTES
Charan العلي is a 60 year old who is being evaluated via a billable video visit.      How would you like to obtain your AVS? mychart  If the video visit is dropped, the invitation should be resent by: send link to [phone]  Will anyone else be joining your video visit? NO      Video Start Time: 0800  Video-Visit Details    Type of service:  Video Visit    Video End Time:8:32 AM    Originating Location (pt. Location): Home    Distant Location (provider location):  [facility] NEUROLOGY [facility]     Platform used for Video Visit: Intelen

## 2021-04-16 NOTE — PROGRESS NOTES
[facility] Neurology Follow Up Visit    Anderson Mitchell MRN# [id number]   Age: 60 year old YOB: 1960     Brief history of symptoms: The patient was initially seen in neurologic consultation on 3/15/2021 for evaluation of neuropathy. Please see the comprehensive neurologic consultation note from that date in the Epic records for details. The patient had 6 months of numbness in his right hand that was recurrent (also reported 9 months of parethesias of the b/l hands and feet in 2015 which led to EMG showing remote L4-S1 radiculopathy.  On exam the patient had slightly diminished proprioception on the b/l toes and was to have an EMG and serum studies to look for common causes of neuropathy.    EMG on 3/17/2021 was normal.    Serum studies were revealing for slightly elevated B6, slight monoclonal IgG of lambda light chain on immunofixation and SPEP.    Follow up studies with ELP urine showed faint monoclonal IgG immunoglobulin of lambda light chain type, and kappa lambda light chain showed Lambda free light chains at 2.88.    Interval history: The patient has the same symptoms as before (intermittent tingling over his hands and feet) lasting at times all day long.  He has noted that the use of tylenol was helpful for relieving some if his symptoms.       Medications:     Current Outpatient Medications   Medication Sig     cetirizine (ZYRTEC) 10 MG tablet Take 10 mg by mouth daily     fluticasone (FLONASE) 50 MCG/ACT nasal spray 2 sprays by Both Nostrils route daily.     hydrochlorothiazide (HYDRODIURIL) 25 MG tablet Take 1 tablet by mouth daily.     Multiple Vitamin (MULTIVITAMIN) per tablet Take 1 tablet by mouth daily.     rabeprazole (ACIPHEX) 20 MG tablet Take 3 tablets by mouth daily.     st johns wort 300 MG TABS tablet Take 300 mg by mouth 3 times daily     triamcinolone (NASACORT) 55 MCG/ACT nasal aerosol 55 sprays     VERAPAMIL HCL PO Take 240 mg by mouth       Review of Systems:   A comprehensive 10  point review of systems (constitutional, ENT, cardiac, peripheral vascular, lymphatic, respiratory, GI, , Musculoskeletal, skin, Neurological) was performed and found to be negative except as described in this note.      Physical Exam:   Telephone         Assessment and Plan:   Assessment:  Small fiber neuropathy, suspected  Monoclonal IgG with elevated Lambda light chain    The patient is having continued symptoms of numbness and tingling in the hands and feet.  He has had some response to tylenol in the past, and may respond to a neuropathic agent for his sensory disturbances if they are coming from (as suspected) a small fiber neuropathy. Given his results on immunofixation and kappa/lambda light chain testing, I do think he would benefit from seeing a hematology/oncology provider to further investigate a potential MGUS.     Plan:  - Gabapentin titrate to 300 mg TID  - Hematology/Oncology referral    Follow up in Neurology clinic in 8 weeks or earlier as needed should new concerns arise.    JERRELL Wallace D.O.   of Neurology    Total time (33 min) in this patient encounter was spent on pre-charting, counseling and/or coordination of care. We reviewed diagnostic results, impressions, and discussed other possible tests if symptoms do not improve. We discussed the implications of the diagnosis, as well as risks and benefits of management options. We reviewed treatment instructions and our scheduled follow-up as specified in the discharge plan. We also discussed the importance of compliance with the chosen course of treatment. The patient is in agreement with this plan and has no further questions.

## 2021-04-16 NOTE — LETTER
4/16/2021       RE: Anderson Mitchell  [address]  [place] [address]     Dear Colleague,    Thank you for referring your patient, Anderson Mitchell, to the [facility] NEUROLOGY CLINIC [place] at [facility]. Please see a copy of my visit note below.    [facility] Neurology Follow Up Visit    Anderson Mitchell MRN# [id number]   Age: 60 year old YOB: 1960     Brief history of symptoms: The patient was initially seen in neurologic consultation on 3/15/2021 for evaluation of neuropathy. Please see the comprehensive neurologic consultation note from that date in the Epic records for details. The patient had 6 months of numbness in his right hand that was recurrent (also reported 9 months of parethesias of the b/l hands and feet in 2015 which led to EMG showing remote L4-S1 radiculopathy.  On exam the patient had slightly diminished proprioception on the b/l toes and was to have an EMG and serum studies to look for common causes of neuropathy.    EMG on 3/17/2021 was normal.    Serum studies were revealing for slightly elevated B6, slight monoclonal IgG of lambda light chain on immunofixation and SPEP.    Follow up studies with ELP urine showed faint monoclonal IgG immunoglobulin of lambda light chain type, and kappa lambda light chain showed Lambda free light chains at 2.88.    Interval history: The patient has the same symptoms as before (intermittent tingling over his hands and feet) lasting at times all day long.  He has noted that the use of tylenol was helpful for relieving some if his symptoms.       Medications:     Current Outpatient Medications   Medication Sig     cetirizine (ZYRTEC) 10 MG tablet Take 10 mg by mouth daily     fluticasone (FLONASE) 50 MCG/ACT nasal spray 2 sprays by Both Nostrils route daily.     hydrochlorothiazide (HYDRODIURIL) 25 MG tablet Take 1 tablet by mouth daily.     Multiple Vitamin (MULTIVITAMIN) per  tablet Take 1 tablet by mouth daily.     rabeprazole (ACIPHEX) 20 MG tablet Take 3 tablets by mouth daily.     st johns wort 300 MG TABS tablet Take 300 mg by mouth 3 times daily     triamcinolone (NASACORT) 55 MCG/ACT nasal aerosol 55 sprays     VERAPAMIL HCL PO Take 240 mg by mouth       Review of Systems:   A comprehensive 10 point review of systems (constitutional, ENT, cardiac, peripheral vascular, lymphatic, respiratory, GI, , Musculoskeletal, skin, Neurological) was performed and found to be negative except as described in this note.      Physical Exam:   Telephone         Assessment and Plan:   Assessment:  Small fiber neuropathy, suspected  Monoclonal IgG with elevated Lambda light chain    The patient is having continued symptoms of numbness and tingling in the hands and feet.  He has had some response to tylenol in the past, and may respond to a neuropathic agent for his sensory disturbances if they are coming from (as suspected) a small fiber neuropathy. Given his results on immunofixation and kappa/lambda light chain testing, I do think he would benefit from seeing a hematology/oncology provider to further investigate a potential MGUS.     Plan:  - Gabapentin titrate to 300 mg TID  - Hematology/Oncology referral    Follow up in Neurology clinic in 8 weeks or earlier as needed should new concerns arise.    JERRELL Wallace D.O.   of Neurology    Total time (33 min) in this patient encounter was spent on pre-charting, counseling and/or coordination of care. We reviewed diagnostic results, impressions, and discussed other possible tests if symptoms do not improve. We discussed the implications of the diagnosis, as well as risks and benefits of management options. We reviewed treatment instructions and our scheduled follow-up as specified in the discharge plan. We also discussed the importance of compliance with the chosen course of treatment. The patient is in agreement with this  plan and has no further questions.      Charan العلي is a 60 year old who is being evaluated via a billable video visit.      How would you like to obtain your AVS? mychart  If the video visit is dropped, the invitation should be resent by: send link to [phone]  Will anyone else be joining your video visit? NO      Video Start Time: 0800  Video-Visit Details    Type of service:  Video Visit    Video End Time:8:32 AM    Originating Location (pt. Location): Home    Distant Location (provider location):  [facility] NEUROLOGY [facility]     Platform used for Video Visit: [email or web address]      Again, thank you for allowing me to participate in the care of your patient.      Sincerely,    Timo Wallace, DO

## 2021-05-18 ENCOUNTER — PRE VISIT (OUTPATIENT)
Dept: ONCOLOGY | Facility: CLINIC | Age: 61
End: 2021-05-18

## 2021-06-08 DIAGNOSIS — Z11.59 ENCOUNTER FOR SCREENING FOR OTHER VIRAL DISEASES: ICD-10-CM

## 2021-06-08 DIAGNOSIS — M25.572 PAIN IN JOINT INVOLVING ANKLE AND FOOT, LEFT: Primary | ICD-10-CM

## 2021-06-14 ENCOUNTER — VIRTUAL VISIT (OUTPATIENT)
Dept: ONCOLOGY | Facility: CLINIC | Age: 61
End: 2021-06-14
Attending: PSYCHIATRY & NEUROLOGY
Payer: COMMERCIAL

## 2021-06-14 DIAGNOSIS — G62.9 NEUROPATHY: ICD-10-CM

## 2021-06-14 DIAGNOSIS — D47.2 MGUS (MONOCLONAL GAMMOPATHY OF UNKNOWN SIGNIFICANCE): Primary | ICD-10-CM

## 2021-06-14 PROCEDURE — 99204 OFFICE O/P NEW MOD 45 MIN: CPT | Mod: 95 | Performed by: INTERNAL MEDICINE

## 2021-06-14 NOTE — H&P (VIEW-ONLY)
Visit Date: 06/14/2021     This consult has been requested by Dr. iTmo Wallace for monoclonal gammopathy.     Mr. Mitchell is a 61-year-old gentleman with polyneuropathy.  The patient follows up in neurology clinic.  He had multiple labs done as part of workup for neuropathy and it revealed monoclonal gammopathy. His labs are reviewed and summarized below.  1.  Multiple labs done on 02/03/2021:  -HIV nonreactive.  -Normal calcium and creatinine.  -Normal hemoglobin A1c.  -Normal ESR.  -Normal TSH.  -Normal vitamin B12.  -Normal folic acid.  2.  Multiple labs done on 03/15/2021:    -Normal LFT.  -Normal copper.  -Normal zinc.  -Normal vitamin D.  -SPEP revealed monoclonal peak of 0.8.  -Serum immunofixation reveals monoclonal IgG lambda.  -IgG level of 1562.  3.  On 04/01/2021:  -Edison free light chain of 1.27 and lambda free light chain of 2.88. Normal ratio.  -Urine immunofixation reveals faint monoclonal IgG lambda.  4.  Multiple CBCs have been done in the past.  WBC, hemoglobin and platelet have been normal.  MCV has been mildly low around 77.  The patient carries a diagnosis of thalassemia trait.     The patient had gastric GIST many years ago.  In 2008, he had EGD done, which revealed a submucosal smooth nodule 5 cm near the antrum of the stomach.  The patient had wedge gastrectomy on 05/05/2008.  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  No angiolymphatic or perineural invasion.  Margins negative.     REVIEW OF SYSTEMS:  Main problem is neuropathy.  He has numbness in the hand.  He has some numbness and sometimes burning sensation in the feet.  He is still active.  He can do all the work.  He exercises without restrictions.     REVIEW OF SYSTEMS:  No headache.  No dizziness.  No neck pain.  No chest pain.  No shortness of breath at rest.  No abdominal pain, nausea or vomiting.  No urinary or bowel complaints.  No bleeding.  No fever, chills, or night sweats.     He had constipation.  It  resolved when he stopped gabapentin.     All other review of systems is negative.     ALLERGIES:  Reviewed.     MEDICATIONS:  Reviewed.     PAST MEDICAL HISTORY:    1.  Peripheral neuropathy.  2.  Anxiety.  3.  Osteoarthritis.  4.  Left ankle fracture.  5.  GIST status post resection in 05/2008.  6.  Hypertension.  7.  Thalassemia trait.  8.  Partial gastrectomy.     SOCIAL HISTORY:    -No smoking.   -No alcohol use.     FAMILY HISTORY:    -Father had prostate cancer.    -Mother had some kind of intestinal cancer.    -He has 4 brothers.  No cancer in them.  -He has 4 sisters. One has breast cancer.     PHYSICAL EXAMINATION:    He is alert and oriented x 3.  Not in any distress.    No cough. No labored breathing.   Rest of a comprehensive physical exam was deferred due to public health emergency video visit restrictions.     LABORATORY:  Reviewed.     ASSESSMENT:    1.  A 61-year-old gentleman with MGUS.  2.  Peripheral neuropathy.  3.  Gastric GIST, status post partial gastrectomy in 05/2008.  No evidence of recurrence.  4.  Thalassemia trait.     RECOMMENDATIONS:    1.  I had a long discussion with the patient via video visit.  Labs were all reviewed. It revealed monoclonal gammopathy.  Difference between  MGUS and myeloma reviewed.  Based on all the investigations so far, he has MGUS.  I discussed with him regarding relationship of a monoclonal gammopathy and neuropathy.  I explained to the patient that we need to rule out myeloma/amyloidosis.  He already had multiple labs including SPEP, serum and urine immunofixation done.  Discussed regarding bone marrow biopsy.  Procedure was explained.  He is agreeable for it.  Bone marrow biopsy under sedation will be scheduled.  We also discussed regarding a skeletal survey.  He is agreeable for it.  Bone survey will be scheduled.  I will see him in about 2 weeks after the bone marrow biopsy.     2.  The patient has history of gastric GIST.  It is 13 years since he had a  gastric resection.  He is doing well.  Last CT abdomen and pelvis on 12/10/2020 did not reveal any evidence of malignancy. No follow up scans needed.    3.  The patient has mild microcytosis without anemia.  He carries a diagnosis of thalassemia trait, which will explain that.    4.  He had few questions, which were all answered.  I will see him after bone marrow biopsy.     Thanks for the consult.     Total video visit time of 45 minutes.     Jackie Braden MD           D: 2021   T: 2021   MT: [id number]     Name:     LITZY MUSTAFA  MRN:      [id number]        Account:    [id number]   :      1960           Visit Date: 2021      Document: [id number]

## 2021-06-14 NOTE — PROGRESS NOTES
Charan العلي is a 61 year old who is being evaluated via a billable video visit.      How would you like to obtain your AVS? Amazonhart    Please send text to: [phone]    Will anyone else be joining your video visit? No      Video Start Time:   Video-Visit Details    Type of service:  Video Visit    Video End Time:    Originating Location (pt. Location): Home    Distant Location (provider location):  [facility]     Platform used for Video Visit: Charles

## 2021-06-14 NOTE — PATIENT INSTRUCTIONS
1. Bone marrow biopsy in next 1-2 weeks.  2. Bone survey in next few weeks.  3. See me in 1 month.    * Please call to schedule

## 2021-06-14 NOTE — LETTER
6/14/2021         RE: Anderson Mitchell  [address]  [facility] [address]        Dear Colleague,    Thank you for referring your patient, Anderson Mitchell, to the [facility]. Please see a copy of my visit note below.    Charan العلي is a 61 year old who is being evaluated via a billable video visit.      How would you like to obtain your AVS? [employer]    Please send text to: [phone]    Will anyone else be joining your video visit? No  {If patient encounters technical issues they should call [phone] :[id number]}    Video Start Time:   Video-Visit Details    Type of service:  Video Visit    Video End Time:    Originating Location (pt. Location): Home    Distant Location (provider location):  [facility]     Platform used for Video Visit: [facility]    Visit Date: 06/14/2021     This consult has been requested by Dr. Timo Wallace for monoclonal gammopathy.     Mr. Mitchell is a 61-year-old gentleman with polyneuropathy.  The patient follows up in neurology clinic.  He had multiple labs done as part of workup for neuropathy and it revealed monoclonal gammopathy. His labs are reviewed and summarized below.  1.  Multiple labs done on 02/03/2021:  -HIV nonreactive.  -Normal calcium and creatinine.  -Normal hemoglobin A1c.  -Normal ESR.  -Normal TSH.  -Normal vitamin B12.  -Normal folic acid.  2.  Multiple labs done on 03/15/2021:    -Normal LFT.  -Normal copper.  -Normal zinc.  -Normal vitamin D.  -SPEP revealed monoclonal peak of 0.8.  -Serum immunofixation reveals monoclonal IgG lambda.  -IgG level of 1562.  3.  On 04/01/2021:  -[place] free light chain of 1.27 and lambda free light chain of 2.88. Normal ratio.  -Urine immunofixation reveals faint monoclonal IgG lambda.  4.  Multiple CBCs have been done in the past.  WBC, hemoglobin and platelet have been normal.  MCV has been mildly low around 77.  The patient carries a diagnosis of  thalassemia trait.     The patient had gastric GIST many years ago.  In 2008, he had EGD done, which revealed a submucosal smooth nodule 5 cm near the antrum of the stomach.  The patient had wedge gastrectomy on 05/05/2008.  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  No angiolymphatic or perineural invasion.  Margins negative.     REVIEW OF SYSTEMS:  Main problem is neuropathy.  He has numbness in the hand.  He has some numbness and sometimes burning sensation in the feet.  He is still active.  He can do all the work.  He exercises without restrictions.     REVIEW OF SYSTEMS:  No headache.  No dizziness.  No neck pain.  No chest pain.  No shortness of breath at rest.  No abdominal pain, nausea or vomiting.  No urinary or bowel complaints.  No bleeding.  No fever, chills, or night sweats.     He had constipation.  It resolved when he stopped gabapentin.     All other review of systems is negative.     ALLERGIES:  Reviewed.     MEDICATIONS:  Reviewed.     PAST MEDICAL HISTORY:    1.  Peripheral neuropathy.  2.  Anxiety.  3.  Osteoarthritis.  4.  Left ankle fracture.  5.  GIST status post resection in 05/2008.  6.  Hypertension.  7.  Thalassemia trait.  8.  Partial gastrectomy.     SOCIAL HISTORY:    -No smoking.   -No alcohol use.     FAMILY HISTORY:    -Father had prostate cancer.    -Mother had some kind of intestinal cancer.    -He has 4 brothers.  No cancer in them.  -He has 4 sisters. One has breast cancer.     PHYSICAL EXAMINATION:    He is alert and oriented x 3.  Not in any distress.    No cough. No labored breathing.   Rest of a comprehensive physical exam was deferred due to public health emergency video visit restrictions.     LABORATORY:  Reviewed.     ASSESSMENT:    1.  A 61-year-old gentleman with MGUS.  2.  Peripheral neuropathy.  3.  Gastric GIST, status post partial gastrectomy in 05/2008.  No evidence of recurrence.  4.  Thalassemia trait.     RECOMMENDATIONS:    1.  I had a long discussion  with the patient via video visit.  Labs were all reviewed. It revealed monoclonal gammopathy.  Difference between  MGUS and myeloma reviewed.  Based on all the investigations so far, he has MGUS.  I discussed with him regarding relationship of a monoclonal gammopathy and neuropathy.  I explained to the patient that we need to rule out myeloma/amyloidosis.  He already had multiple labs including SPEP, serum and urine immunofixation done.  Discussed regarding bone marrow biopsy.  Procedure was explained.  He is agreeable for it.  Bone marrow biopsy under sedation will be scheduled.  We also discussed regarding a skeletal survey.  He is agreeable for it.  Bone survey will be scheduled.  I will see him in about 2 weeks after the bone marrow biopsy.     2.  The patient has history of gastric GIST.  It is 13 years since he had a gastric resection.  He is doing well.  Last CT abdomen and pelvis on 12/10/2020 did not reveal any evidence of malignancy. No follow up scans needed.    3.  The patient has mild microcytosis without anemia.  He carries a diagnosis of thalassemia trait, which will explain that.    4.  He had few questions, which were all answered.  I will see him after bone marrow biopsy.     Thanks for the consult.     Total video visit time of 45 minutes.     Jackie Braden MD           D: 2021   T: 2021   MT: [id number]     Name:     LITZY MUSTAFA  MRN:      [id number]        Account:    [id number]   :      1960           Visit Date: 2021      Document: [id number]    Visit Date: 2021     This consult has been requested by Dr. Timo Wallace for monoclonal gammopathy.     Mr. Mustafa is a 61-year-old gentleman with polyneuropathy.  The patient follows up in neurology clinic.  He had multiple labs done as part of workup for neuropathy and it revealed monoclonal gammopathy. His labs are reviewed and summarized below.  1.  Multiple labs done on 2021:  -HIV  nonreactive.  -Normal calcium and creatinine.  -Normal hemoglobin A1c.  -Normal ESR.  -Normal TSH.  -Normal vitamin B12.  -Normal folic acid.  2.  Multiple labs done on 03/15/2021:    -Normal LFT.  -Normal copper.  -Normal zinc.  -Normal vitamin D.  -SPEP revealed monoclonal peak of 0.8.  -Serum immunofixation reveals monoclonal IgG lambda.  -IgG level of 1562.  3.  On 04/01/2021:  -[place] free light chain of 1.27 and lambda free light chain of 2.88. Normal ratio.  -Urine immunofixation reveals faint monoclonal IgG lambda.  4.  Multiple CBCs have been done in the past.  WBC, hemoglobin and platelet have been normal.  MCV has been mildly low around 77.  The patient carries a diagnosis of thalassemia trait.     The patient had gastric GIST many years ago.  In 2008, he had EGD done, which revealed a submucosal smooth nodule 5 cm near the antrum of the stomach.  The patient had wedge gastrectomy on 05/05/2008.  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  No angiolymphatic or perineural invasion.  Margins negative.     REVIEW OF SYSTEMS:  Main problem is neuropathy.  He has numbness in the hand.  He has some numbness and sometimes burning sensation in the feet.  He is still active.  He can do all the work.  He exercises without restrictions.     REVIEW OF SYSTEMS:  No headache.  No dizziness.  No neck pain.  No chest pain.  No shortness of breath at rest.  No abdominal pain, nausea or vomiting.  No urinary or bowel complaints.  No bleeding.  No fever, chills, or night sweats.     He had constipation.  It resolved when he stopped gabapentin.     All other review of systems is negative.     ALLERGIES:  Reviewed.     MEDICATIONS:  Reviewed.     PAST MEDICAL HISTORY:    1.  Peripheral neuropathy.  2.  Anxiety.  3.  Osteoarthritis.  4.  Left ankle fracture.  5.  GIST status post resection in 05/2008.  6.  Hypertension.  7.  Thalassemia trait.  8.  Partial gastrectomy.     SOCIAL HISTORY:    -No smoking.   -No alcohol  use.     FAMILY HISTORY:    -Father had prostate cancer.    -Mother had some kind of intestinal cancer.    -He has 4 brothers.  No cancer in them.  -He has 4 sisters. One has breast cancer.     PHYSICAL EXAMINATION:    He is alert and oriented x 3.  Not in any distress.    No cough. No labored breathing.   Rest of a comprehensive physical exam was deferred due to [facility] emergency video visit restrictions.     LABORATORY:  Reviewed.     ASSESSMENT:    1.  A 61-year-old gentleman with MGUS.  2.  Peripheral neuropathy.  3.  Gastric GIST, status post partial gastrectomy in 05/2008.  No evidence of recurrence.  4.  Thalassemia trait.     RECOMMENDATIONS:    1.  I had a long discussion with the patient via video visit.  Labs were all reviewed. It revealed monoclonal gammopathy.  Difference between  MGUS and myeloma reviewed.  Based on all the investigations so far, he has MGUS.  I discussed with him regarding relationship of a monoclonal gammopathy and neuropathy.  I explained to the patient that we need to rule out myeloma/amyloidosis.  He already had multiple labs including SPEP, serum and urine immunofixation done.  Discussed regarding bone marrow biopsy.  Procedure was explained.  He is agreeable for it.  Bone marrow biopsy under sedation will be scheduled.  We also discussed regarding a skeletal survey.  He is agreeable for it.  Bone survey will be scheduled.  I will see him in about 2 weeks after the bone marrow biopsy.     2.  The patient has history of gastric GIST.  It is 13 years since he had a gastric resection.  He is doing well.  Last CT abdomen and pelvis on 12/10/2020 did not reveal any evidence of malignancy. No follow up scans needed.    3.  The patient has mild microcytosis without anemia.  He carries a diagnosis of thalassemia trait, which will explain that.    4.  He had few questions, which were all answered.  I will see him after bone marrow biopsy.     Thanks for the consult.     Total video  visit time of 45 minutes.     Jackie Braden MD           D: 2021   T: 2021   MT: [id number]     Name:     LITZY MUSTAFA  MRN:      [id number]        Account:    [id number]   :      1960           Visit Date: 2021      Document: [id number]          Again, thank you for allowing me to participate in the care of your patient.        Sincerely,        Jackie Braden MD

## 2021-06-14 NOTE — LETTER
6/14/2021         RE: Anderson Mitchell  [address]  [facility] [address]        Dear Colleague,    Thank you for referring your patient, Anderson Mitchell, to the [facility]. Please see a copy of my visit note below.    Charan العلي is a 61 year old who is being evaluated via a billable video visit.      How would you like to obtain your AVS? [employer]    Please send text to: [phone]    Will anyone else be joining your video visit? No  {If patient encounters technical issues they should call [phone] :[id number]}    Video Start Time:   Video-Visit Details    Type of service:  Video Visit    Video End Time:    Originating Location (pt. Location): Home    Distant Location (provider location):  [facility]     Platform used for Video Visit: [employer]    Visit Date: 06/14/2021     This consult has been requested by Dr. Timo Wallace for monoclonal gammopathy.     Mr. Mitchell is a 61-year-old gentleman with polyneuropathy.  The patient follows up in neurology clinic.  He had multiple labs done as part of workup for neuropathy and it revealed monoclonal gammopathy. His labs are reviewed and summarized below.  1.  Multiple labs done on 02/03/2021:  -HIV nonreactive.  -Normal calcium and creatinine.  -Normal hemoglobin A1c.  -Normal ESR.  -Normal TSH.  -Normal vitamin B12.  -Normal folic acid.  2.  Multiple labs done on 03/15/2021:    -Normal LFT.  -Normal copper.  -Normal zinc.  -Normal vitamin D.  -SPEP revealed monoclonal peak of 0.8.  -Serum immunofixation reveals monoclonal IgG lambda.  -IgG level of 1562.  3.  On 04/01/2021:  -[place] free light chain of 1.27 and lambda free light chain of 2.88. Normal ratio.  -Urine immunofixation reveals faint monoclonal IgG lambda.  4.  Multiple CBCs have been done in the past.  WBC, hemoglobin and platelet have been normal.  MCV has been mildly low around 77.  The patient carries a diagnosis of  thalassemia trait.     The patient had gastric GIST many years ago.  In 2008, he had EGD done, which revealed a submucosal smooth nodule 5 cm near the antrum of the stomach.  The patient had wedge gastrectomy on 05/05/2008.  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  No angiolymphatic or perineural invasion.  Margins negative.     REVIEW OF SYSTEMS:  Main problem is neuropathy.  He has numbness in the hand.  He has some numbness and sometimes burning sensation in the feet.  He is still active.  He can do all the work.  He exercises without restrictions.     REVIEW OF SYSTEMS:  No headache.  No dizziness.  No neck pain.  No chest pain.  No shortness of breath at rest.  No abdominal pain, nausea or vomiting.  No urinary or bowel complaints.  No bleeding.  No fever, chills, or night sweats.     He had constipation.  It resolved when he stopped gabapentin.     All other review of systems is negative.     ALLERGIES:  Reviewed.     MEDICATIONS:  Reviewed.     PAST MEDICAL HISTORY:    1.  Peripheral neuropathy.  2.  Anxiety.  3.  Osteoarthritis.  4.  Left ankle fracture.  5.  GIST status post resection in 05/2008.  6.  Hypertension.  7.  Thalassemia trait.  8.  Partial gastrectomy.     SOCIAL HISTORY:    -No smoking.   -No alcohol use.     FAMILY HISTORY:    -Father had prostate cancer.    -Mother had some kind of intestinal cancer.    -He has 4 brothers.  No cancer in them.  -He has 4 sisters. One has breast cancer.     PHYSICAL EXAMINATION:    He is alert and oriented x 3.  Not in any distress.    No cough. No labored breathing.   Rest of a comprehensive physical exam was deferred due to public health emergency video visit restrictions.     LABORATORY:  Reviewed.     ASSESSMENT:    1.  A 61-year-old gentleman with MGUS.  2.  Peripheral neuropathy.  3.  Gastric GIST, status post partial gastrectomy in 05/2008.  No evidence of recurrence.  4.  Thalassemia trait.     RECOMMENDATIONS:    1.  I had a long discussion  with the patient via video visit.  Labs were all reviewed. It revealed monoclonal gammopathy.  Difference between  MGUS and myeloma reviewed.  Based on all the investigations so far, he has MGUS.  I discussed with him regarding relationship of a monoclonal gammopathy and neuropathy.  I explained to the patient that we need to rule out myeloma/amyloidosis.  He already had multiple labs including SPEP, serum and urine immunofixation done.  Discussed regarding bone marrow biopsy.  Procedure was explained.  He is agreeable for it.  Bone marrow biopsy under sedation will be scheduled.  We also discussed regarding a skeletal survey.  He is agreeable for it.  Bone survey will be scheduled.  I will see him in about 2 weeks after the bone marrow biopsy.     2.  The patient has history of gastric GIST.  It is 13 years since he had a gastric resection.  He is doing well.  Last CT abdomen and pelvis on 12/10/2020 did not reveal any evidence of malignancy. No follow up scans needed.    3.  The patient has mild microcytosis without anemia.  He carries a diagnosis of thalassemia trait, which will explain that.    4.  He had few questions, which were all answered.  I will see him after bone marrow biopsy.     Thanks for the consult.     Total video visit time of 45 minutes.     Jackie Braden MD           D: 2021   T: 2021   MT: [id number]     Name:     LITZY MUSTAFA  MRN:      [id number]        Account:    [id number]   :      1960           Visit Date: 2021      Document: [id number]    Visit Date: 2021     This consult has been requested by Dr. Timo Wallace for monoclonal gammopathy.     Mr. Mustafa is a 61-year-old gentleman with polyneuropathy.  The patient follows up in neurology clinic.  He had multiple labs done as part of workup for neuropathy and it revealed monoclonal gammopathy. His labs are reviewed and summarized below.  1.  Multiple labs done on 2021:  -HIV  nonreactive.  -Normal calcium and creatinine.  -Normal hemoglobin A1c.  -Normal ESR.  -Normal TSH.  -Normal vitamin B12.  -Normal folic acid.  2.  Multiple labs done on 03/15/2021:    -Normal LFT.  -Normal copper.  -Normal zinc.  -Normal vitamin D.  -SPEP revealed monoclonal peak of 0.8.  -Serum immunofixation reveals monoclonal IgG lambda.  -IgG level of 1562.  3.  On 04/01/2021:  -[place] free light chain of 1.27 and lambda free light chain of 2.88. Normal ratio.  -Urine immunofixation reveals faint monoclonal IgG lambda.  4.  Multiple CBCs have been done in the past.  WBC, hemoglobin and platelet have been normal.  MCV has been mildly low around 77.  The patient carries a diagnosis of thalassemia trait.     The patient had gastric GIST many years ago.  In 2008, he had EGD done, which revealed a submucosal smooth nodule 5 cm near the antrum of the stomach.  The patient had wedge gastrectomy on 05/05/2008.  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  No angiolymphatic or perineural invasion.  Margins negative.     REVIEW OF SYSTEMS:  Main problem is neuropathy.  He has numbness in the hand.  He has some numbness and sometimes burning sensation in the feet.  He is still active.  He can do all the work.  He exercises without restrictions.     REVIEW OF SYSTEMS:  No headache.  No dizziness.  No neck pain.  No chest pain.  No shortness of breath at rest.  No abdominal pain, nausea or vomiting.  No urinary or bowel complaints.  No bleeding.  No fever, chills, or night sweats.     He had constipation.  It resolved when he stopped gabapentin.     All other review of systems is negative.     ALLERGIES:  Reviewed.     MEDICATIONS:  Reviewed.     PAST MEDICAL HISTORY:    1.  Peripheral neuropathy.  2.  Anxiety.  3.  Osteoarthritis.  4.  Left ankle fracture.  5.  GIST status post resection in 05/2008.  6.  Hypertension.  7.  Thalassemia trait.  8.  Partial gastrectomy.     SOCIAL HISTORY:    -No smoking.   -No alcohol  use.     FAMILY HISTORY:    -Father had prostate cancer.    -Mother had some kind of intestinal cancer.    -He has 4 brothers.  No cancer in them.  -He has 4 sisters. One has breast cancer.     PHYSICAL EXAMINATION:    He is alert and oriented x 3.  Not in any distress.    No cough. No labored breathing.   Rest of a comprehensive physical exam was deferred due to [facility] emergency video visit restrictions.     LABORATORY:  Reviewed.     ASSESSMENT:    1.  A 61-year-old gentleman with MGUS.  2.  Peripheral neuropathy.  3.  Gastric GIST, status post partial gastrectomy in 05/2008.  No evidence of recurrence.  4.  Thalassemia trait.     RECOMMENDATIONS:    1.  I had a long discussion with the patient via video visit.  Labs were all reviewed. It revealed monoclonal gammopathy.  Difference between  MGUS and myeloma reviewed.  Based on all the investigations so far, he has MGUS.  I discussed with him regarding relationship of a monoclonal gammopathy and neuropathy.  I explained to the patient that we need to rule out myeloma/amyloidosis.  He already had multiple labs including SPEP, serum and urine immunofixation done.  Discussed regarding bone marrow biopsy.  Procedure was explained.  He is agreeable for it.  Bone marrow biopsy under sedation will be scheduled.  We also discussed regarding a skeletal survey.  He is agreeable for it.  Bone survey will be scheduled.  I will see him in about 2 weeks after the bone marrow biopsy.     2.  The patient has history of gastric GIST.  It is 13 years since he had a gastric resection.  He is doing well.  Last CT abdomen and pelvis on 12/10/2020 did not reveal any evidence of malignancy. No follow up scans needed.    3.  The patient has mild microcytosis without anemia.  He carries a diagnosis of thalassemia trait, which will explain that.    4.  He had few questions, which were all answered.  I will see him after bone marrow biopsy.     Thanks for the consult.     Total video  visit time of 45 minutes.     Jackie Braden MD           D: 2021   T: 2021   MT: [id number]     Name:     LITZY MUSTAFA  MRN:      [id number]        Account:    [id number]   :      1960           Visit Date: 2021      Document: [id number]          Again, thank you for allowing me to participate in the care of your patient.        Sincerely,        Jackie Braden MD

## 2021-06-14 NOTE — PROGRESS NOTES
Visit Date: 06/14/2021     This consult has been requested by Dr. iTmo Wallace for monoclonal gammopathy.     Mr. Mitchell is a 61-year-old gentleman with polyneuropathy.  The patient follows up in neurology clinic.  He had multiple labs done as part of workup for neuropathy and it revealed monoclonal gammopathy. His labs are reviewed and summarized below.  1.  Multiple labs done on 02/03/2021:  -HIV nonreactive.  -Normal calcium and creatinine.  -Normal hemoglobin A1c.  -Normal ESR.  -Normal TSH.  -Normal vitamin B12.  -Normal folic acid.  2.  Multiple labs done on 03/15/2021:    -Normal LFT.  -Normal copper.  -Normal zinc.  -Normal vitamin D.  -SPEP revealed monoclonal peak of 0.8.  -Serum immunofixation reveals monoclonal IgG lambda.  -IgG level of 1562.  3.  On 04/01/2021:  -[place] free light chain of 1.27 and lambda free light chain of 2.88. Normal ratio.  -Urine immunofixation reveals faint monoclonal IgG lambda.  4.  Multiple CBCs have been done in the past.  WBC, hemoglobin and platelet have been normal.  MCV has been mildly low around 77.  The patient carries a diagnosis of thalassemia trait.     The patient had gastric GIST many years ago.  In 2008, he had EGD done, which revealed a submucosal smooth nodule 5 cm near the antrum of the stomach.  The patient had wedge gastrectomy on 05/05/2008.  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  No angiolymphatic or perineural invasion.  Margins negative.     REVIEW OF SYSTEMS:  Main problem is neuropathy.  He has numbness in the hand.  He has some numbness and sometimes burning sensation in the feet.  He is still active.  He can do all the work.  He exercises without restrictions.     REVIEW OF SYSTEMS:  No headache.  No dizziness.  No neck pain.  No chest pain.  No shortness of breath at rest.  No abdominal pain, nausea or vomiting.  No urinary or bowel complaints.  No bleeding.  No fever, chills, or night sweats.     He had constipation.  It  resolved when he stopped gabapentin.     All other review of systems is negative.     ALLERGIES:  Reviewed.     MEDICATIONS:  Reviewed.     PAST MEDICAL HISTORY:    1.  Peripheral neuropathy.  2.  Anxiety.  3.  Osteoarthritis.  4.  Left ankle fracture.  5.  GIST status post resection in 05/2008.  6.  Hypertension.  7.  Thalassemia trait.  8.  Partial gastrectomy.     SOCIAL HISTORY:    -No smoking.   -No alcohol use.     FAMILY HISTORY:    -Father had prostate cancer.    -Mother had some kind of intestinal cancer.    -He has 4 brothers.  No cancer in them.  -He has 4 sisters. One has breast cancer.     PHYSICAL EXAMINATION:    He is alert and oriented x 3.  Not in any distress.    No cough. No labored breathing.   Rest of a comprehensive physical exam was deferred due to public health emergency video visit restrictions.     LABORATORY:  Reviewed.     ASSESSMENT:    1.  A 61-year-old gentleman with MGUS.  2.  Peripheral neuropathy.  3.  Gastric GIST, status post partial gastrectomy in 05/2008.  No evidence of recurrence.  4.  Thalassemia trait.     RECOMMENDATIONS:    1.  I had a long discussion with the patient via video visit.  Labs were all reviewed. It revealed monoclonal gammopathy.  Difference between  MGUS and myeloma reviewed.  Based on all the investigations so far, he has MGUS.  I discussed with him regarding relationship of a monoclonal gammopathy and neuropathy.  I explained to the patient that we need to rule out myeloma/amyloidosis.  He already had multiple labs including SPEP, serum and urine immunofixation done.  Discussed regarding bone marrow biopsy.  Procedure was explained.  He is agreeable for it.  Bone marrow biopsy under sedation will be scheduled.  We also discussed regarding a skeletal survey.  He is agreeable for it.  Bone survey will be scheduled.  I will see him in about 2 weeks after the bone marrow biopsy.     2.  The patient has history of gastric GIST.  It is 13 years since he had a  gastric resection.  He is doing well.  Last CT abdomen and pelvis on 12/10/2020 did not reveal any evidence of malignancy. No follow up scans needed.    3.  The patient has mild microcytosis without anemia.  He carries a diagnosis of thalassemia trait, which will explain that.    4.  He had few questions, which were all answered.  I will see him after bone marrow biopsy.     Thanks for the consult.     Total video visit time of 45 minutes.     Jackie Braden MD           D: 2021   T: 2021   MT: [id number]     Name:     LITZY MUSTAFA  MRN:      [id number]        Account:    [id number]   :      1960           Visit Date: 2021      Document: [id number]

## 2021-06-15 DIAGNOSIS — Z11.59 ENCOUNTER FOR SCREENING FOR OTHER VIRAL DISEASES: ICD-10-CM

## 2021-06-15 LAB
LABORATORY COMMENT REPORT: NORMAL
SARS-COV-2 RNA RESP QL NAA+PROBE: NEGATIVE
SARS-COV-2 RNA RESP QL NAA+PROBE: NORMAL
SPECIMEN SOURCE: NORMAL
SPECIMEN SOURCE: NORMAL

## 2021-06-15 PROCEDURE — U0003 INFECTIOUS AGENT DETECTION BY NUCLEIC ACID (DNA OR RNA); SEVERE ACUTE RESPIRATORY SYNDROME CORONAVIRUS 2 (SARS-COV-2) (CORONAVIRUS DISEASE [COVID-19]), AMPLIFIED PROBE TECHNIQUE, MAKING USE OF HIGH THROUGHPUT TECHNOLOGIES AS DESCRIBED BY CMS-2020-01-R: HCPCS | Performed by: ORTHOPAEDIC SURGERY

## 2021-06-15 PROCEDURE — U0005 INFEC AGEN DETEC AMPLI PROBE: HCPCS | Performed by: ORTHOPAEDIC SURGERY

## 2021-06-17 ENCOUNTER — ANCILLARY PROCEDURE (OUTPATIENT)
Dept: GENERAL RADIOLOGY | Facility: CLINIC | Age: 61
End: 2021-06-17
Attending: ORTHOPAEDIC SURGERY
Payer: COMMERCIAL

## 2021-06-17 DIAGNOSIS — M25.572 PAIN IN JOINT INVOLVING ANKLE AND FOOT, LEFT: ICD-10-CM

## 2021-06-17 PROCEDURE — 20605 DRAIN/INJ JOINT/BURSA W/O US: CPT | Mod: LT | Performed by: RADIOLOGY

## 2021-06-17 PROCEDURE — 77002 NEEDLE LOCALIZATION BY XRAY: CPT | Mod: GC | Performed by: RADIOLOGY

## 2021-06-17 RX ORDER — LIDOCAINE HYDROCHLORIDE 10 MG/ML
30 INJECTION, SOLUTION EPIDURAL; INFILTRATION; INTRACAUDAL; PERINEURAL ONCE
Status: COMPLETED | OUTPATIENT
Start: 2021-06-17 | End: 2021-06-17

## 2021-06-17 RX ORDER — TRIAMCINOLONE ACETONIDE 40 MG/ML
40 INJECTION, SUSPENSION INTRA-ARTICULAR; INTRAMUSCULAR ONCE
Status: COMPLETED | OUTPATIENT
Start: 2021-06-17 | End: 2021-06-17

## 2021-06-17 RX ORDER — IOPAMIDOL 408 MG/ML
10 INJECTION, SOLUTION INTRATHECAL ONCE
Status: COMPLETED | OUTPATIENT
Start: 2021-06-17 | End: 2021-06-17

## 2021-06-17 RX ORDER — BUPIVACAINE HYDROCHLORIDE 2.5 MG/ML
10 INJECTION, SOLUTION EPIDURAL; INFILTRATION; INTRACAUDAL ONCE
Status: COMPLETED | OUTPATIENT
Start: 2021-06-17 | End: 2021-06-17

## 2021-06-17 RX ADMIN — BUPIVACAINE HYDROCHLORIDE 10 MG: 2.5 INJECTION, SOLUTION EPIDURAL; INFILTRATION; INTRACAUDAL at 11:24

## 2021-06-17 RX ADMIN — LIDOCAINE HYDROCHLORIDE 5 ML: 10 INJECTION, SOLUTION EPIDURAL; INFILTRATION; INTRACAUDAL; PERINEURAL at 11:24

## 2021-06-17 RX ADMIN — IOPAMIDOL 2 ML: 408 INJECTION, SOLUTION INTRATHECAL at 11:24

## 2021-06-17 RX ADMIN — TRIAMCINOLONE ACETONIDE 40 MG: 40 INJECTION, SUSPENSION INTRA-ARTICULAR; INTRAMUSCULAR at 11:24

## 2021-06-18 ENCOUNTER — VIRTUAL VISIT (OUTPATIENT)
Dept: NEUROLOGY | Facility: CLINIC | Age: 61
End: 2021-06-18
Payer: COMMERCIAL

## 2021-06-18 DIAGNOSIS — R69 DIAGNOSIS DEFERRED: Primary | ICD-10-CM

## 2021-06-18 PROCEDURE — 99207 PR NO BILLABLE SERVICE THIS VISIT: CPT | Performed by: PSYCHIATRY & NEUROLOGY

## 2021-06-18 NOTE — LETTER
6/18/2021       RE: Anderson Mitchell  [address]  [facility] [address]     Dear Colleague,    Thank you for referring your patient, Anderson Mitchell, to the [facility]. Please see a copy of my visit note below.    [facility] Follow Up Visit    Anderson Mitchell MRN# [id number]   Age: 61 year old YOB: 1960     Brief history of symptoms: The patient was initially seen in neurologic consultation on 3/15/2021 and again for follow up 4/16/2021 for evaluation of right hand numbness, and history of b/l hands and feet numbness in 2015. Please see the comprehensive neurologic consultation note from that date in the Epic records for details. EMG in 2015 showed L4-S1 radiculopathy on the right.  Exam on our initial visit showed slightly diminished proprioception on the toes symmetrically.  EMG on 3/17/2021 was normal.  Serum studies showed elevated B6, and monoclonal IgG of lambda light chain with faint monoclonal lambda light chain.  He was suspected to have a small fiber neuropathy, and was to try gabapentin while having a hematology/oncology referral.    Interval history: The patient was seen 6/14/2021 with Hematology/Oncology (Dr. Braden) and was given the diagnosis of MGUS based on laboratory testing.  He was to have a bone marrow biopsy, skeletal survey.    The patient was unable to be contacted for follow up through video or phone services. I would recommend follow up after his oncology w/up is relatively complete, for further management of neuropathic pain and sensory symptoms with neuropathic agents (gabapentin, consider duloxetine if needed).    JERRELL Wallace D.O.  [facility]        Again, thank you for allowing me to participate in the care of your patient.      Sincerely,    Timo Wallace, DO

## 2021-06-18 NOTE — PROGRESS NOTES
[facility] Follow Up Visit    Anderson Mitchell MRN# [id number]   Age: 61 year old YOB: 1960     Brief history of symptoms: The patient was initially seen in neurologic consultation on 3/15/2021 and again for follow up 4/16/2021 for evaluation of right hand numbness, and history of b/l hands and feet numbness in 2015. Please see the comprehensive neurologic consultation note from that date in the Epic records for details. EMG in 2015 showed L4-S1 radiculopathy on the right.  Exam on our initial visit showed slightly diminished proprioception on the toes symmetrically.  EMG on 3/17/2021 was normal.  Serum studies showed elevated B6, and monoclonal IgG of lambda light chain with faint monoclonal lambda light chain.  He was suspected to have a small fiber neuropathy, and was to try gabapentin while having a hematology/oncology referral.    Interval history: The patient was seen 6/14/2021 with Hematology/Oncology (Dr. Braden) and was given the diagnosis of MGUS based on laboratory testing.  He was to have a bone marrow biopsy, skeletal survey.    The patient was unable to be contacted for follow up through video or phone services. I would recommend follow up after his oncology w/up is relatively complete, for further management of neuropathic pain and sensory symptoms with neuropathic agents (gabapentin, consider duloxetine if needed).    JERRELL Wallace D.O.  [facility]

## 2021-06-18 NOTE — H&P (VIEW-ONLY)
[facility] Follow Up Visit    Anderson Mitchell MRN# [id number]   Age: 61 year old YOB: 1960     Brief history of symptoms: The patient was initially seen in neurologic consultation on 3/15/2021 and again for follow up 4/16/2021 for evaluation of right hand numbness, and history of b/l hands and feet numbness in 2015. Please see the comprehensive neurologic consultation note from that date in the Epic records for details. EMG in 2015 showed L4-S1 radiculopathy on the right.  Exam on our initial visit showed slightly diminished proprioception on the toes symmetrically.  EMG on 3/17/2021 was normal.  Serum studies showed elevated B6, and monoclonal IgG of lambda light chain with faint monoclonal lambda light chain.  He was suspected to have a small fiber neuropathy, and was to try gabapentin while having a hematology/oncology referral.    Interval history: The patient was seen 6/14/2021 with Hematology/Oncology (Dr. Braden) and was given the diagnosis of MGUS based on laboratory testing.  He was to have a bone marrow biopsy, skeletal survey.    The patient was unable to be contacted for follow up through video or phone services. I would recommend follow up after his oncology w/up is relatively complete, for further management of neuropathic pain and sensory symptoms with neuropathic agents (gabapentin, consider duloxetine if needed).    JERRELL Wallace D.O.  [facility]

## 2021-06-19 NOTE — PROGRESS NOTES
Visit Date: 06/14/2021     This consult has been requested by Dr. Timo Wallace for monoclonal gammopathy.     Mr. Mitchell is a 61-year-old gentleman with polyneuropathy.  The patient follows up in neurology clinic.  He had multiple labs done as part of workup for neuropathy and it revealed monoclonal gammopathy. His labs are reviewed and summarized below.  1.  Multiple labs done on 02/03/2021:  -HIV nonreactive.  -Normal calcium and creatinine.  -Normal hemoglobin A1c.  -Normal ESR.  -Normal TSH.  -Normal vitamin B12.  -Normal folic acid.  2.  Multiple labs done on 03/15/2021:    -Normal LFT.  -Normal copper.  -Normal zinc.  -Normal vitamin D.  -SPEP revealed monoclonal peak of 0.8.  -Serum immunofixation reveals monoclonal IgG lambda.  -IgG level of 1562.  3.  On 04/01/2021:  -[place] free light chain of 1.27 and lambda free light chain of 2.88. Normal ratio.  -Urine immunofixation reveals faint monoclonal IgG lambda.  4.  Multiple CBCs have been done in the past.  WBC, hemoglobin and platelet have been normal.  MCV has been mildly low around 77.  The patient carries a diagnosis of thalassemia trait.     The patient had gastric GIST many years ago.  In 2008, he had EGD done, which revealed a submucosal smooth nodule 5 cm near the antrum of the stomach.  The patient had wedge gastrectomy on 05/05/2008.  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  No angiolymphatic or perineural invasion.  Margins negative.     REVIEW OF SYSTEMS:  Main problem is neuropathy.  He has numbness in the hand.  He has some numbness and sometimes burning sensation in the feet.  He is still active.  He can do all the work.  He exercises without restrictions.     REVIEW OF SYSTEMS:  No headache.  No dizziness.  No neck pain.  No chest pain.  No shortness of breath at rest.  No abdominal pain, nausea or vomiting.  No urinary or bowel complaints.  No bleeding.  No fever, chills, or night sweats.     He had constipation.  It  resolved when he stopped gabapentin.     All other review of systems is negative.     ALLERGIES:  Reviewed.     MEDICATIONS:  Reviewed.     PAST MEDICAL HISTORY:    1.  Peripheral neuropathy.  2.  Anxiety.  3.  Osteoarthritis.  4.  Left ankle fracture.  5.  GIST status post resection in 05/2008.  6.  Hypertension.  7.  Thalassemia trait.  8.  Partial gastrectomy.     SOCIAL HISTORY:    -No smoking.   -No alcohol use.     FAMILY HISTORY:    -Father had prostate cancer.    -Mother had some kind of intestinal cancer.    -He has 4 brothers.  No cancer in them.  -He has 4 sisters. One has breast cancer.     PHYSICAL EXAMINATION:    He is alert and oriented x 3.  Not in any distress.    No cough. No labored breathing.   Rest of a comprehensive physical exam was deferred due to public health emergency video visit restrictions.     LABORATORY:  Reviewed.     ASSESSMENT:    1.  A 61-year-old gentleman with MGUS.  2.  Peripheral neuropathy.  3.  Gastric GIST, status post partial gastrectomy in 05/2008.  No evidence of recurrence.  4.  Thalassemia trait.     RECOMMENDATIONS:    1.  I had a long discussion with the patient via video visit.  Labs were all reviewed. It revealed monoclonal gammopathy.  Difference between  MGUS and myeloma reviewed.  Based on all the investigations so far, he has MGUS.  I discussed with him regarding relationship of a monoclonal gammopathy and neuropathy.  I explained to the patient that we need to rule out myeloma/amyloidosis.  He already had multiple labs including SPEP, serum and urine immunofixation done.  Discussed regarding bone marrow biopsy.  Procedure was explained.  He is agreeable for it.  Bone marrow biopsy under sedation will be scheduled.  We also discussed regarding a skeletal survey.  He is agreeable for it.  Bone survey will be scheduled.  I will see him in about 2 weeks after the bone marrow biopsy.     2.  The patient has history of gastric GIST.  It is 13 years since he had a  gastric resection.  He is doing well.  Last CT abdomen and pelvis on 12/10/2020 did not reveal any evidence of malignancy. No follow up scans needed.    3.  The patient has mild microcytosis without anemia.  He carries a diagnosis of thalassemia trait, which will explain that.    4.  He had few questions, which were all answered.  I will see him after bone marrow biopsy.     Thanks for the consult.     Total video visit time of 45 minutes.     Jackie Braden MD           D: 2021   T: 2021   MT: [id number]     Name:     LITZY MUSTAFA  MRN:      [id number]        Account:    [id number]   :      1960           Visit Date: 2021      Document: [id number]

## 2021-06-27 DIAGNOSIS — Z11.59 ENCOUNTER FOR SCREENING FOR OTHER VIRAL DISEASES: ICD-10-CM

## 2021-06-27 LAB
SARS-COV-2 RNA RESP QL NAA+PROBE: NORMAL
SPECIMEN SOURCE: NORMAL

## 2021-06-27 PROCEDURE — U0005 INFEC AGEN DETEC AMPLI PROBE: HCPCS | Performed by: PATHOLOGY

## 2021-06-27 PROCEDURE — U0003 INFECTIOUS AGENT DETECTION BY NUCLEIC ACID (DNA OR RNA); SEVERE ACUTE RESPIRATORY SYNDROME CORONAVIRUS 2 (SARS-COV-2) (CORONAVIRUS DISEASE [COVID-19]), AMPLIFIED PROBE TECHNIQUE, MAKING USE OF HIGH THROUGHPUT TECHNOLOGIES AS DESCRIBED BY CMS-2020-01-R: HCPCS | Performed by: PATHOLOGY

## 2021-06-28 LAB
LABORATORY COMMENT REPORT: NORMAL
SARS-COV-2 RNA RESP QL NAA+PROBE: NEGATIVE
SPECIMEN SOURCE: NORMAL

## 2021-06-30 ENCOUNTER — MEDICAL CORRESPONDENCE (OUTPATIENT)
Dept: HEALTH INFORMATION MANAGEMENT | Facility: CLINIC | Age: 61
End: 2021-06-30
Payer: COMMERCIAL

## 2021-07-01 ENCOUNTER — ANESTHESIA EVENT (OUTPATIENT)
Dept: GASTROENTEROLOGY | Facility: CLINIC | Age: 61
End: 2021-07-01
Payer: COMMERCIAL

## 2021-07-01 ENCOUNTER — ANESTHESIA (OUTPATIENT)
Dept: GASTROENTEROLOGY | Facility: CLINIC | Age: 61
End: 2021-07-01
Payer: COMMERCIAL

## 2021-07-01 ENCOUNTER — HOSPITAL ENCOUNTER (OUTPATIENT)
Facility: CLINIC | Age: 61
Discharge: HOME | End: 2021-07-01
Attending: PATHOLOGY | Admitting: PATHOLOGY
Payer: COMMERCIAL

## 2021-07-01 VITALS
SYSTOLIC BLOOD PRESSURE: 92 MMHG | DIASTOLIC BLOOD PRESSURE: 68 MMHG | WEIGHT: 201 LBS | HEART RATE: 50 BPM | RESPIRATION RATE: 14 BRPM | BODY MASS INDEX: 26.52 KG/M2 | OXYGEN SATURATION: 100 %

## 2021-07-01 DIAGNOSIS — D47.2 MONOCLONAL PARAPROTEINEMIA: Primary | ICD-10-CM

## 2021-07-01 LAB
BASOPHILS # BLD AUTO: 0.1 10E9/L (ref 0–0.2)
BASOPHILS NFR BLD AUTO: 1.1 %
DIFFERENTIAL METHOD BLD: NORMAL
EOSINOPHIL # BLD AUTO: 0.1 10E9/L (ref 0–0.7)
EOSINOPHIL NFR BLD AUTO: 1.9 %
ERYTHROCYTE [DISTWIDTH] IN BLOOD BY AUTOMATED COUNT: 13.9 % (ref 10–15)
HCT VFR BLD AUTO: 41.8 % (ref 40–53)
HGB BLD-MCNC: 14.7 G/DL (ref 13.3–17.7)
IMM GRANULOCYTES # BLD: 0 10E9/L (ref 0–0.4)
IMM GRANULOCYTES NFR BLD: 0.4 %
LYMPHOCYTES # BLD AUTO: 1.3 10E9/L (ref 0.8–5.3)
LYMPHOCYTES NFR BLD AUTO: 23.3 %
MCH RBC QN AUTO: 28.7 PG (ref 26.5–33)
MCHC RBC AUTO-ENTMCNC: 35.2 G/DL (ref 31.5–36.5)
MCV RBC AUTO: 82 FL (ref 78–100)
MONOCYTES # BLD AUTO: 0.3 10E9/L (ref 0–1.3)
MONOCYTES NFR BLD AUTO: 5.7 %
NEUTROPHILS # BLD AUTO: 3.7 10E9/L (ref 1.6–8.3)
NEUTROPHILS NFR BLD AUTO: 67.6 %
NRBC # BLD AUTO: 0 10*3/UL
NRBC BLD AUTO-RTO: 0 /100
PLATELET # BLD AUTO: 197 10E9/L (ref 150–450)
RBC # BLD AUTO: 5.12 10E12/L (ref 4.4–5.9)
RETICS # AUTO: 50.7 10E9/L (ref 25–95)
RETICS/RBC NFR AUTO: 1 % (ref 0.5–2)
WBC # BLD AUTO: 5.4 10E9/L (ref 4–11)

## 2021-07-01 PROCEDURE — 250N000011 HC RX IP 250 OP 636: Performed by: NURSE ANESTHETIST, CERTIFIED REGISTERED

## 2021-07-01 PROCEDURE — 88291 CYTO/MOLECULAR REPORT: CPT | Performed by: MEDICAL GENETICS

## 2021-07-01 PROCEDURE — 88271 CYTOGENETICS DNA PROBE: CPT | Performed by: PATHOLOGY

## 2021-07-01 PROCEDURE — 85045 AUTOMATED RETICULOCYTE COUNT: CPT | Performed by: PATHOLOGY

## 2021-07-01 PROCEDURE — 88184 FLOWCYTOMETRY/ TC 1 MARKER: CPT | Performed by: PATHOLOGY

## 2021-07-01 PROCEDURE — 88185 FLOWCYTOMETRY/TC ADD-ON: CPT | Performed by: PATHOLOGY

## 2021-07-01 PROCEDURE — 999N001068 HC STATISTIC BONE MARROW CORE PERF TC 38221: Performed by: INTERNAL MEDICINE

## 2021-07-01 PROCEDURE — 999N000010 HC STATISTIC ANES STAT CODE-CRNA PER MINUTE: Performed by: PATHOLOGY

## 2021-07-01 PROCEDURE — 38221 DX BONE MARROW BIOPSIES: CPT | Performed by: PATHOLOGY

## 2021-07-01 PROCEDURE — 88264 CHROMOSOME ANALYSIS 20-25: CPT | Performed by: PATHOLOGY

## 2021-07-01 PROCEDURE — 88237 TISSUE CULTURE BONE MARROW: CPT | Performed by: PATHOLOGY

## 2021-07-01 PROCEDURE — 88311 DECALCIFY TISSUE: CPT | Mod: 26 | Performed by: PATHOLOGY

## 2021-07-01 PROCEDURE — 999N001109 HC STATISTIC MORPHOLOGY W/INTERP HISTOLOGY TC 85060: Performed by: INTERNAL MEDICINE

## 2021-07-01 PROCEDURE — 88313 SPECIAL STAINS GROUP 2: CPT | Mod: TC | Performed by: INTERNAL MEDICINE

## 2021-07-01 PROCEDURE — 88311 DECALCIFY TISSUE: CPT | Mod: TC | Performed by: INTERNAL MEDICINE

## 2021-07-01 PROCEDURE — 88342 IMHCHEM/IMCYTCHM 1ST ANTB: CPT | Mod: TC,XU | Performed by: INTERNAL MEDICINE

## 2021-07-01 PROCEDURE — 258N000003 HC RX IP 258 OP 636: Performed by: NURSE ANESTHETIST, CERTIFIED REGISTERED

## 2021-07-01 PROCEDURE — 88313 SPECIAL STAINS GROUP 2: CPT | Mod: 26 | Performed by: PATHOLOGY

## 2021-07-01 PROCEDURE — 999N001102 HC STATISTIC DNA PROCESS AND HOLD: Performed by: PATHOLOGY

## 2021-07-01 PROCEDURE — 88188 FLOWCYTOMETRY/READ 9-15: CPT | Performed by: PATHOLOGY

## 2021-07-01 PROCEDURE — 88305 TISSUE EXAM BY PATHOLOGIST: CPT | Mod: 26 | Performed by: PATHOLOGY

## 2021-07-01 PROCEDURE — 85025 COMPLETE CBC W/AUTO DIFF WBC: CPT | Performed by: PATHOLOGY

## 2021-07-01 PROCEDURE — 88342 IMHCHEM/IMCYTCHM 1ST ANTB: CPT | Mod: 26 | Performed by: PATHOLOGY

## 2021-07-01 PROCEDURE — 88280 CHROMOSOME KARYOTYPE STUDY: CPT | Performed by: PATHOLOGY

## 2021-07-01 PROCEDURE — 999N001136 HC STATISTIC FLOW INT 9-15 ABY TC 88188: Performed by: PATHOLOGY

## 2021-07-01 PROCEDURE — 38222 DX BONE MARROW BX & ASPIR: CPT | Performed by: PATHOLOGY

## 2021-07-01 PROCEDURE — 88341 IMHCHEM/IMCYTCHM EA ADD ANTB: CPT | Mod: TC | Performed by: INTERNAL MEDICINE

## 2021-07-01 PROCEDURE — 88305 TISSUE EXAM BY PATHOLOGIST: CPT | Mod: TC | Performed by: INTERNAL MEDICINE

## 2021-07-01 PROCEDURE — 370N000017 HC ANESTHESIA TECHNICAL FEE, PER MIN: Performed by: PATHOLOGY

## 2021-07-01 PROCEDURE — 36415 COLL VENOUS BLD VENIPUNCTURE: CPT | Performed by: PATHOLOGY

## 2021-07-01 PROCEDURE — 250N000009 HC RX 250: Performed by: PATHOLOGY

## 2021-07-01 PROCEDURE — 250N000009 HC RX 250: Performed by: NURSE ANESTHETIST, CERTIFIED REGISTERED

## 2021-07-01 PROCEDURE — 88275 CYTOGENETICS 100-300: CPT | Performed by: PATHOLOGY

## 2021-07-01 PROCEDURE — 88341 IMHCHEM/IMCYTCHM EA ADD ANTB: CPT | Mod: 26 | Performed by: PATHOLOGY

## 2021-07-01 RX ORDER — TAMSULOSIN HYDROCHLORIDE 0.4 MG/1
0.4 CAPSULE ORAL DAILY
COMMUNITY
End: 2023-11-09

## 2021-07-01 RX ORDER — FENTANYL CITRATE 50 UG/ML
25-50 INJECTION, SOLUTION INTRAMUSCULAR; INTRAVENOUS
Status: CANCELLED | OUTPATIENT
Start: 2021-07-01

## 2021-07-01 RX ORDER — FENTANYL CITRATE 50 UG/ML
INJECTION, SOLUTION INTRAMUSCULAR; INTRAVENOUS PRN
Status: DISCONTINUED | OUTPATIENT
Start: 2021-07-01 | End: 2021-07-01

## 2021-07-01 RX ORDER — NALOXONE HYDROCHLORIDE 0.4 MG/ML
0.2 INJECTION, SOLUTION INTRAMUSCULAR; INTRAVENOUS; SUBCUTANEOUS
Status: CANCELLED | OUTPATIENT
Start: 2021-07-01 | End: 2021-07-02

## 2021-07-01 RX ORDER — SODIUM CHLORIDE, SODIUM LACTATE, POTASSIUM CHLORIDE, CALCIUM CHLORIDE 600; 310; 30; 20 MG/100ML; MG/100ML; MG/100ML; MG/100ML
INJECTION, SOLUTION INTRAVENOUS CONTINUOUS
Status: CANCELLED | OUTPATIENT
Start: 2021-07-01

## 2021-07-01 RX ORDER — LIDOCAINE HYDROCHLORIDE 10 MG/ML
8-10 INJECTION, SOLUTION EPIDURAL; INFILTRATION; INTRACAUDAL; PERINEURAL
Status: DISCONTINUED | OUTPATIENT
Start: 2021-07-01 | End: 2021-07-01 | Stop reason: HOSPADM

## 2021-07-01 RX ORDER — NALOXONE HYDROCHLORIDE 0.4 MG/ML
0.4 INJECTION, SOLUTION INTRAMUSCULAR; INTRAVENOUS; SUBCUTANEOUS
Status: CANCELLED | OUTPATIENT
Start: 2021-07-01

## 2021-07-01 RX ORDER — ONDANSETRON 2 MG/ML
INJECTION INTRAMUSCULAR; INTRAVENOUS PRN
Status: DISCONTINUED | OUTPATIENT
Start: 2021-07-01 | End: 2021-07-01

## 2021-07-01 RX ORDER — HYDROMORPHONE HYDROCHLORIDE 1 MG/ML
.3-.5 INJECTION, SOLUTION INTRAMUSCULAR; INTRAVENOUS; SUBCUTANEOUS EVERY 5 MIN PRN
Status: CANCELLED | OUTPATIENT
Start: 2021-07-01

## 2021-07-01 RX ORDER — SODIUM CHLORIDE, SODIUM LACTATE, POTASSIUM CHLORIDE, CALCIUM CHLORIDE 600; 310; 30; 20 MG/100ML; MG/100ML; MG/100ML; MG/100ML
INJECTION, SOLUTION INTRAVENOUS CONTINUOUS PRN
Status: DISCONTINUED | OUTPATIENT
Start: 2021-07-01 | End: 2021-07-01

## 2021-07-01 RX ORDER — FLUMAZENIL 0.1 MG/ML
0.2 INJECTION, SOLUTION INTRAVENOUS
Status: CANCELLED | OUTPATIENT
Start: 2021-07-01 | End: 2021-07-01

## 2021-07-01 RX ORDER — NALOXONE HYDROCHLORIDE 0.4 MG/ML
0.2 INJECTION, SOLUTION INTRAMUSCULAR; INTRAVENOUS; SUBCUTANEOUS
Status: CANCELLED | OUTPATIENT
Start: 2021-07-01

## 2021-07-01 RX ORDER — PROPOFOL 10 MG/ML
INJECTION, EMULSION INTRAVENOUS CONTINUOUS PRN
Status: DISCONTINUED | OUTPATIENT
Start: 2021-07-01 | End: 2021-07-01

## 2021-07-01 RX ORDER — ONDANSETRON 4 MG/1
4 TABLET, ORALLY DISINTEGRATING ORAL EVERY 30 MIN PRN
Status: CANCELLED | OUTPATIENT
Start: 2021-07-01

## 2021-07-01 RX ORDER — ONDANSETRON 2 MG/ML
4 INJECTION INTRAMUSCULAR; INTRAVENOUS EVERY 30 MIN PRN
Status: CANCELLED | OUTPATIENT
Start: 2021-07-01

## 2021-07-01 RX ORDER — LIDOCAINE HYDROCHLORIDE 20 MG/ML
INJECTION, SOLUTION INFILTRATION; PERINEURAL PRN
Status: DISCONTINUED | OUTPATIENT
Start: 2021-07-01 | End: 2021-07-01

## 2021-07-01 RX ORDER — NALOXONE HYDROCHLORIDE 0.4 MG/ML
0.4 INJECTION, SOLUTION INTRAMUSCULAR; INTRAVENOUS; SUBCUTANEOUS
Status: CANCELLED | OUTPATIENT
Start: 2021-07-01 | End: 2021-07-02

## 2021-07-01 RX ADMIN — MIDAZOLAM 2 MG: 1 INJECTION INTRAMUSCULAR; INTRAVENOUS at 09:03

## 2021-07-01 RX ADMIN — ONDANSETRON 4 MG: 2 INJECTION INTRAMUSCULAR; INTRAVENOUS at 09:06

## 2021-07-01 RX ADMIN — FENTANYL CITRATE 50 MCG: 50 INJECTION, SOLUTION INTRAMUSCULAR; INTRAVENOUS at 09:03

## 2021-07-01 RX ADMIN — LIDOCAINE HYDROCHLORIDE 40 MG: 20 INJECTION, SOLUTION INFILTRATION; PERINEURAL at 09:03

## 2021-07-01 RX ADMIN — SODIUM CHLORIDE, POTASSIUM CHLORIDE, SODIUM LACTATE AND CALCIUM CHLORIDE: 600; 310; 30; 20 INJECTION, SOLUTION INTRAVENOUS at 08:59

## 2021-07-01 RX ADMIN — PROPOFOL 125 MCG/KG/MIN: 10 INJECTION, EMULSION INTRAVENOUS at 09:00

## 2021-07-01 RX ADMIN — LIDOCAINE HYDROCHLORIDE 8 ML: 10; .005 INJECTION, SOLUTION EPIDURAL; INFILTRATION; INTRACAUDAL; PERINEURAL at 09:21

## 2021-07-01 ASSESSMENT — LIFESTYLE VARIABLES: TOBACCO_USE: 1

## 2021-07-01 ASSESSMENT — ENCOUNTER SYMPTOMS: SEIZURES: 0

## 2021-07-01 NOTE — ANESTHESIA PREPROCEDURE EVALUATION
Anesthesia Pre-Procedure Evaluation    Patient: Anderson Mitchell   MRN: [id number] : 1960        Preoperative Diagnosis: Monoclonal paraproteinemia [D47.2]   Procedure : Procedure(s):  BONE MARROW BIOPSY     Past Medical History:   Diagnosis Date     Anxiety 2016    not Medicated     Arthritis     tr knee and both ankles     Chronic constipation NOW    from Re-flux med     Fracture 2016    lower lt ankle     Gastrointestinal stromal tumor (H) 2008     Hypertension      Malignant neoplasm (H)     gastrointestinal mass-removed      Thalassaemia trait 2013      Past Surgical History:   Procedure Laterality Date     ABDOMEN SURGERY       COLONOSCOPY  2012    Procedure:COLONOSCOPY; Surgeon:TAMIE REYNA; Location: GI     ESOPHAGOSCOPY, GASTROSCOPY, DUODENOSCOPY (EGD), COMBINED N/A 2020    Procedure: ESOPHAGOGASTRODUODENOSCOPY, WITH BIOPSY;  Surgeon: Tejas Bradley MD;  Location: [facility] OR     ORTHOPEDIC SURGERY        Allergies   Allergen Reactions     Cats Itching and Other (See Comments)     Grass      Nkda [No Known Drug Allergies]      No Clinical Screening - See Comments Other (See Comments)     Nuts Itching     Trees       Social History     Tobacco Use     Smoking status: Former Smoker     Packs/day: 0.00     Years: 0.00     Pack years: 0.00     Types: Cigars     Smokeless tobacco: Never Used     Tobacco comment: OCC. CIGAR   Substance Use Topics     Alcohol use: Yes     Alcohol/week: 0.0 standard drinks     Comment: 2 a week      Wt Readings from Last 1 Encounters:   20 91.2 kg (201 lb)        Anesthesia Evaluation            ROS/MED HX  ENT/Pulmonary:     (+) tobacco use, Past use,  (-) asthma, sleep apnea and YUNG risk factors   Neurologic:     (+) peripheral neuropathy, - peripheral.  (-) no seizures and no CVA   Cardiovascular:     (+) hypertension-----    METS/Exercise Tolerance:     Hematologic: Comments: MGUS  Light chain disease      Musculoskeletal:        GI/Hepatic: Comment: GIST tumor removed in 2008   (-) GERD   Renal/Genitourinary:       Endo:  - neg endo ROS     Psychiatric/Substance Use:     (+) psychiatric history anxiety     Infectious Disease:       Malignancy:   (+) Malignancy, History of GI.GI CA  Remission status post Surgery.        Other:            Physical Exam    Airway        Mallampati: III   TM distance: > 3 FB   Neck ROM: full   Mouth opening: > 3 cm    Respiratory Devices and Support         Dental  no notable dental history         Cardiovascular   cardiovascular exam normal       Rhythm and rate: regular     Pulmonary   pulmonary exam normal        breath sounds clear to auscultation           OUTSIDE LABS:  CBC:   Lab Results   Component Value Date    WBC 5.3 05/07/2013    WBC 5.5 05/08/2012    HGB 15.5 05/07/2013    HGB 15.9 05/08/2012    HCT 42.3 05/07/2013    HCT 42.8 05/08/2012     05/07/2013     05/08/2012     BMP:   Lab Results   Component Value Date     (H) 05/07/2013     05/08/2012    POTASSIUM 3.9 05/07/2013    POTASSIUM 4.0 05/08/2012    CHLORIDE 105 05/07/2013    CHLORIDE 104 05/08/2012    CO2 28 05/07/2013    CO2 27 05/08/2012    BUN 16 05/07/2013    BUN 20 05/08/2012    CR 1.22 05/07/2013    CR 1.18 05/08/2012     (H) 05/07/2013     (H) 05/08/2012     COAGS: No results found for: PTT, INR, FIBR  POC: No results found for: BGM, HCG, HCGS  HEPATIC:   Lab Results   Component Value Date    ALBUMIN 3.8 03/15/2021    PROTTOTAL 7.5 03/15/2021    ALT 42 03/15/2021    AST 28 03/15/2021    ALKPHOS 61 03/15/2021    BILITOTAL 0.5 03/15/2021     OTHER:   Lab Results   Component Value Date    JAMES 8.6 05/07/2013       Anesthesia Plan    ASA Status:  2      Anesthesia Type: MAC.     - Reason for MAC: straight local not clinically adequate   Induction: Intravenous, Propofol.           Consents    Anesthesia Plan(s) and associated risks, benefits, and realistic alternatives discussed. Questions  answered and patient/representative(s) expressed understanding.     - Discussed with:  Patient      - Extended Intubation/Ventilatory Support Discussed: No.      - Patient is DNR/DNI Status: No    Use of blood products discussed: No .     Postoperative Care    Pain management: Multi-modal analgesia.   PONV prophylaxis: Ondansetron (or other 5HT-3)     Comments:                Luis Antonio Kenney MD

## 2021-07-01 NOTE — ANESTHESIA CARE TRANSFER NOTE
Patient: Anderson Mitchell    Procedure(s):  BONE MARROW BIOPSY    Diagnosis: Monoclonal paraproteinemia [D47.2]  Diagnosis Additional Information: No value filed.    Anesthesia Type:   MAC     Note:    Oropharynx: oropharynx clear of all foreign objects and spontaneously breathing  Level of Consciousness: awake  Oxygen Supplementation: nasal cannula  Level of Supplemental Oxygen (L/min / FiO2): 2  Independent Airway: airway patency satisfactory and stable  Dentition: dentition unchanged    Report to RN Given: handoff report given  Patient transferred to: PACU    Handoff Report: Identifed the Patient, Identified the Reponsible Provider, Reviewed the pertinent medical history, Discussed the surgical course, Reviewed Intra-OP anesthesia mangement and issues during anesthesia, Set expectations for post-procedure period and Allowed opportunity for questions and acknowledgement of understanding      Vitals: (Last set prior to Anesthesia Care Transfer)  CRNA VITALS  7/1/2021 0916 - 7/1/2021 0949      7/1/2021             Resp Rate (set):  10        Electronically Signed By: JAN Mirza CRNA  July 1, 2021  9:49 AM

## 2021-07-01 NOTE — PROCEDURES
The patient was positively identified and informed consent was obtained (see the completed Affirmation of Consent for Bone Marrow Aspiration and/or Biopsy Procedure(s) form in the patient's chart). The patient was placed in the prone position and the bony landmarks of the pelvis were identified. Medical staff reconfirmed the patient's name, date of birth and procedure. The skin over the posterior iliac crest was scrubbed and draped in a sterile fashion. The local area of the procedure was anesthetized with a total of 9 mL of 1% Lidocaine and a small incision was made.  The patient did receive conscious sedation.    Trephine bone marrow core(s) was/were obtained from the left posterior iliac crest. Bone marrow aspirate was obtained from the left posterior iliac crest for: morphology with immunophenotyping, cytogenetics, and molecular diagnostics.    Direct pressure was applied to the biopsy site with sterile gauze. The biopsy site was cleaned with alcohol and a sterile dressing was placed over the biopsy incision using a pressure bandage. The patient was then placed in the supine position to maintain pressure on the biopsy site. Post-procedure wound care instructions, including routine dressing instructions and analgesia, were given to the patient. The procedure was completed without complication.

## 2021-07-01 NOTE — ANESTHESIA POSTPROCEDURE EVALUATION
Patient: Anderson Mitchell    Procedure(s):  BONE MARROW BIOPSY    Diagnosis:Monoclonal paraproteinemia [D47.2]  Diagnosis Additional Information: No value filed.    Anesthesia Type:  MAC    Note:  Disposition: Outpatient   Postop Pain Control: Uneventful            Sign Out: Well controlled pain   PONV:    Neuro/Psych: Uneventful            Sign Out: Acceptable/Baseline neuro status   Airway/Respiratory: Uneventful            Sign Out: Acceptable/Baseline resp. status   CV/Hemodynamics: Uneventful            Sign Out: Acceptable CV status   Other NRE: NONE   DID A NON-ROUTINE EVENT OCCUR? No           Last vitals:  Vitals:    07/01/21 0830 07/01/21 0950 07/01/21 0957   BP: (!) 142/89 121/76 116/79   Pulse:  52 50   Resp: 16 15 15   SpO2: 99% 99% 100%       Last vitals prior to Anesthesia Care Transfer:  CRNA VITALS  7/1/2021 0916 - 7/1/2021 1008      7/1/2021             EKG:  Sinus bradycardia          Electronically Signed By: Luis Antonio Kenney MD  July 1, 2021  10:08 AM

## 2021-07-02 LAB — COPATH REPORT: NORMAL

## 2021-07-05 LAB — COPATH REPORT: NORMAL

## 2021-07-06 LAB — COPATH REPORT: NORMAL

## 2021-07-12 ENCOUNTER — HOSPITAL ENCOUNTER (OUTPATIENT)
Dept: GENERAL RADIOLOGY | Facility: CLINIC | Age: 61
Discharge: HOME | End: 2021-07-12
Attending: INTERNAL MEDICINE | Admitting: INTERNAL MEDICINE
Payer: COMMERCIAL

## 2021-07-12 DIAGNOSIS — D47.2 MGUS (MONOCLONAL GAMMOPATHY OF UNKNOWN SIGNIFICANCE): ICD-10-CM

## 2021-07-12 PROCEDURE — 77075 RADEX OSSEOUS SURVEY COMPL: CPT

## 2021-07-13 NOTE — RESULT ENCOUNTER NOTE
Dear  Paula,    X-rays look good except some arthritis.    Please, call me with any questions.    Jackie Braden MD

## 2021-07-15 ENCOUNTER — ONCOLOGY VISIT (OUTPATIENT)
Dept: ONCOLOGY | Facility: CLINIC | Age: 61
End: 2021-07-15
Attending: INTERNAL MEDICINE
Payer: COMMERCIAL

## 2021-07-15 VITALS
OXYGEN SATURATION: 100 % | TEMPERATURE: 98.3 F | WEIGHT: 211.4 LBS | DIASTOLIC BLOOD PRESSURE: 82 MMHG | HEART RATE: 52 BPM | BODY MASS INDEX: 28.02 KG/M2 | SYSTOLIC BLOOD PRESSURE: 134 MMHG | HEIGHT: 73 IN

## 2021-07-15 DIAGNOSIS — D47.2 SMOLDERING MYELOMA: Primary | ICD-10-CM

## 2021-07-15 PROCEDURE — 99214 OFFICE O/P EST MOD 30 MIN: CPT | Performed by: INTERNAL MEDICINE

## 2021-07-15 PROCEDURE — G0463 HOSPITAL OUTPT CLINIC VISIT: HCPCS

## 2021-07-15 RX ORDER — RABEPRAZOLE SODIUM 20 MG/1
TABLET, DELAYED RELEASE ORAL
Refills: 0 | COMMUNITY
Start: 2021-07-15 | End: 2022-08-08

## 2021-07-15 ASSESSMENT — PAIN SCALES - GENERAL: PAINLEVEL: NO PAIN (0)

## 2021-07-15 ASSESSMENT — MIFFLIN-ST. JEOR: SCORE: 1827.78

## 2021-07-15 NOTE — LETTER
"    7/15/2021         RE: Anderson Mitchell  [address]  [facility] [address]        Dear Colleague,    Thank you for referring your patient, Anderson Mitchell, to the [facility]. Please see a copy of my visit note below.    Oncology Rooming Note    July 15, 2021 9:08 AM   Anderson Mitchell is a 61 year old male who presents for:    Chief Complaint   Patient presents with     Oncology Clinic Visit     Gastrointestinal stromal tumor (H)     Initial Vitals: /82   Pulse 52   Temp 98.3  F (36.8  C) (Oral)   Ht 1.854 m (6' 1\")   Wt 95.9 kg (211 lb 6.4 oz)   SpO2 100%   BMI 27.89 kg/m   Estimated body mass index is 27.89 kg/m  as calculated from the following:    Height as of this encounter: 1.854 m (6' 1\").    Weight as of this encounter: 95.9 kg (211 lb 6.4 oz). Body surface area is 2.22 meters squared.  No Pain (0) Comment: Data Unavailable   No LMP for male patient.  Allergies reviewed: Yes  Medications reviewed: Yes    Medications: Medication refills not needed today.  Pharmacy name entered into [employer]: [id number] [place] [address] AT [facility]    Clinical concerns: None       Destinee Haddad MA                HEMATOLOGY HISTORY: Mr. Mitchell is a gentleman with MGUS.  -In 2008, EGD revealed a submucosal smooth nodule 5 cm near the antrum of the stomach. Had wedge gastrectomy on 05/05/2008.  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       1.  On 02/03/2021:  -Normal calcium and creatinine.  -Normal hemoglobin A1c.  -Normal TSH.    2.  Multiple labs done on 03/15/2021:    -Normal LFT.  -Normal copper.  -Normal zinc.  -Normal vitamin D.  -SPEP revealed monoclonal peak of 0.8.  -Serum immunofixation reveals monoclonal IgG lambda.  -IgG level of 1562.    3.  On 04/01/2021:  -[place] free light chain of 1.27 and lambda free light chain of 2.88. Normal ratio.  -Urine immunofixation reveals faint monoclonal IgG " lambda.    4.  Multiple CBCs have been done in the past.  WBC, hemoglobin and platelet have been normal.  MCV has been mildly low around 77.  The patient carries a diagnosis of thalassemia trait.    5. Bone marrow biopsy on 07/01/2021 reveals 10% to 15% lambda monotypic plasma cell.  Bone marrow was 40% cellularity with trilineage hematopoiesis.  No amyloid deposits.  Flow cytometry reveals lambda monotypic plasma cells.  -FISH : Gain of 1q (1%), Gain of 11q (68%), Loss of 13q14 (35%). Monosomy 13 (15%) and rearrangement of IGH with (34%) or without (28%) gain of 5' (telomeric) signal   -46,XY[19]    6. Skeletal survey on 07/12/2021 revealed degenerative changes.  No lytic lesion or compression fracture.    SUBJECTIVE:  Mr. Mitchell is a 61-year-old gentleman with monoclonal gammopathy, which was found as part of workup for peripheral neuropathy.  For further investigation, he had a bone marrow biopsy and skeletal survey done.  -Skeletal survey on 07/12/2021 revealed degenerative changes.  No lytic lesion or compression fracture.  -Bone marrow biopsy was done on 07/01/2021.  It reveals 10% to 15% lambda monotypic plasma cell.  Bone marrow was 40% cellularity with trilineage hematopoiesis.  No amyloid deposits.  Flow cytometry reveals lambda monotypic plasma cells.     The patient has neuropathy.  He has numbness in the fingers and the bottom of his feet.  No tingling.  No pain.  The patient says that he feels like he is walking on cotton.     No headache or dizziness.  No chest pain or shortness of breath.  No nausea or vomiting.  Appetite is good.  No bleeding.  No fever or chills.  All other review of systems negative.     PHYSICAL EXAMINATION:    GENERAL:  He is alert and oriented x 3.  ECOG PS of 0.   The rest of the systems not examined.     LABORATORY DATA:  Reviewed.     ASSESSMENT:    1.  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  2.  Peripheral neuropathy.  3.  History of  gastrointestinal stromal  tumor.     PLAN:     1.  Investigations were all reviewed with the patient.  I explained to the patient that he has smoldering myeloma.  Because of 10% to 15% plasma cells, he fits the criteria for smoldering myeloma.  Difference between MGUS, smoldering myeloma and myeloma discussed.  He does not have any evidence of end organ damage.  2.  Discussed regarding his smoldering myeloma.  Natural history of disease explained.  We will continue to monitor him.  I will see him in 3 months' time with labs including CBC, BMP, SPEP, UPEP, free light chain, and 24-hour urine protein.  I explained to the patient that if in the future there is worsening of these markers, we will repeat bone marrow biopsy and also get a PET scan.  3.  The patient's neuropathy is not related to his smoldering myeloma.  4.  I will see him in 3 months' time.  I advised him to call us with any questions or concerns.     TOTAL FACE-TO-FACE TIME SPENT:  25 minutes, more than 50% of the time was spent in counseling and coordination of care.    Visit Date: 07/15/2021    SUBJECTIVE:  Mr. Mitchell is a 61-year-old gentleman with monoclonal gammopathy, which was found as part of workup for peripheral neuropathy.  For further investigation, he had a bone marrow biopsy and skeletal survey done.  -- The skeletal survey on 07/12/2021 revealed degenerative changes.  No lytic lesion or compression fracture.  -- Bone marrow biopsy was done on 07/01/2021.  It reveals 10% to 15% lambda monotypic plasma cell.  Bone marrow was 40% cellularity with trilineage hematopoiesis.  No amyloid deposits.  Flow cytometry reveals lambda monotypic plasma cells.    The patient has neuropathy.  He has numbness in the fingers and the bottom of his feet.  No tingling.  No pain.  The patient says that he feels like he is walking on cotton.    No headache or dizziness.  No chest pain, shortness of breath.  No nausea or vomiting.  Appetite good.  No bleeding.  No fever or chills.  All  other review of systems negative.    PHYSICAL EXAMINATION:    GENERAL:  He is alert and oriented x3.  ECOG PS of 0.    The rest of the systems not examined.    LABORATORY DATA:  Reviewed.    ASSESSMENT:  A 61-year-old gentleman with:   1.  Smoldering myeloma.  2.  Peripheral neuropathy.  3.  History of  gastrointestinal stromal tumor.    PLAN:     1.  Investigations were all reviewed with the patient.  I explained to the patient that he has smoldering myeloma.  Because of 10% to 15% plasma cells, he fits the criteria for smoldering myeloma.  Difference between MGUS, smoldering myeloma and myeloma discussed.  He does not have any evidence of end organ damage.  2.  Discussed regarding his smoldering myeloma.  Natural history of disease explained.  We will continue to monitor him.  I will see him in 3 months' time with labs including CBC, BMP, SPEP, UPEP, free light chain, and 24-hour urine protein.  I explained to the patient that if in the future there is worsening of these markers, we will repeat bone marrow biopsy and also get a PET scan.  3.  The patient's neuropathy is not related to his smoldering myeloma.  4.  I will see him in 3 months' time.  I advised him to call us with any questions or concerns.    TOTAL FACE-TO-FACE TIME SPENT:  25 minutes, more than 50% of the time was spent in counseling and coordination of care.    Jackie Braden MD        D: 07/15/2021   T: 07/15/2021   MT: [id number]    Name:     LITZY MUSTAFA  MRN:      [id number]        Account:    [id number]   :      1960           Visit Date: 07/15/2021     Document: [id number]      Again, thank you for allowing me to participate in the care of your patient.        Sincerely,        Jackie Braden MD

## 2021-07-15 NOTE — PROGRESS NOTES
"Oncology Rooming Note    July 15, 2021 9:08 AM   Anderson Mitchell is a 61 year old male who presents for:    Chief Complaint   Patient presents with     Oncology Clinic Visit     Gastrointestinal stromal tumor (H)     Initial Vitals: /82   Pulse 52   Temp 98.3  F (36.8  C) (Oral)   Ht 1.854 m (6' 1\")   Wt 95.9 kg (211 lb 6.4 oz)   SpO2 100%   BMI 27.89 kg/m   Estimated body mass index is 27.89 kg/m  as calculated from the following:    Height as of this encounter: 1.854 m (6' 1\").    Weight as of this encounter: 95.9 kg (211 lb 6.4 oz). Body surface area is 2.22 meters squared.  No Pain (0) Comment: Data Unavailable   No LMP for male patient.  Allergies reviewed: Yes  Medications reviewed: Yes    Medications: Medication refills not needed today.  Pharm[facility] into [id number] [place] [address] [facility]    Clinical concerns: None       Destinee Haddad MA              "

## 2021-07-21 ENCOUNTER — MEDICAL CORRESPONDENCE (OUTPATIENT)
Dept: HEALTH INFORMATION MANAGEMENT | Facility: CLINIC | Age: 61
End: 2021-07-21

## 2021-07-21 ENCOUNTER — TRANSFERRED RECORDS (OUTPATIENT)
Dept: HEALTH INFORMATION MANAGEMENT | Facility: CLINIC | Age: 61
End: 2021-07-21

## 2021-07-21 LAB
COPATH REPORT: NORMAL
COPATH REPORT: NORMAL

## 2021-07-24 NOTE — PROGRESS NOTES
HEMATOLOGY HISTORY: Mr. Mitchell is a gentleman with MGUS.  -In 2008, EGD revealed a submucosal smooth nodule 5 cm near the antrum of the stomach. Had wedge gastrectomy on 05/05/2008.  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       1.  On 02/03/2021:  -Normal calcium and creatinine.  -Normal hemoglobin A1c.  -Normal TSH.    2.  Multiple labs done on 03/15/2021:    -Normal LFT.  -Normal copper.  -Normal zinc.  -Normal vitamin D.  -SPEP revealed monoclonal peak of 0.8.  -Serum immunofixation reveals monoclonal IgG lambda.  -IgG level of 1562.    3.  On 04/01/2021:  -[place] free light chain of 1.27 and lambda free light chain of 2.88. Normal ratio.  -Urine immunofixation reveals faint monoclonal IgG lambda.    4.  Multiple CBCs have been done in the past.  WBC, hemoglobin and platelet have been normal.  MCV has been mildly low around 77.  The patient carries a diagnosis of thalassemia trait.    5. Bone marrow biopsy on 07/01/2021 reveals 10% to 15% lambda monotypic plasma cell.  Bone marrow was 40% cellularity with trilineage hematopoiesis.  No amyloid deposits.  Flow cytometry reveals lambda monotypic plasma cells.  -FISH : Gain of 1q (1%), Gain of 11q (68%), Loss of 13q14 (35%). Monosomy 13 (15%) and rearrangement of IGH with (34%) or without (28%) gain of 5' (telomeric) signal   -46,XY[19]    6. Skeletal survey on 07/12/2021 revealed degenerative changes.  No lytic lesion or compression fracture.    SUBJECTIVE:  Mr. Mitchell is a 61-year-old gentleman with monoclonal gammopathy, which was found as part of workup for peripheral neuropathy.  For further investigation, he had a bone marrow biopsy and skeletal survey done.  -Skeletal survey on 07/12/2021 revealed degenerative changes.  No lytic lesion or compression fracture.  -Bone marrow biopsy was done on 07/01/2021.  It reveals 10% to 15% lambda monotypic plasma cell.  Bone marrow was 40% cellularity with trilineage hematopoiesis.  No amyloid deposits.   Flow cytometry reveals lambda monotypic plasma cells.     The patient has neuropathy.  He has numbness in the fingers and the bottom of his feet.  No tingling.  No pain.  The patient says that he feels like he is walking on cotton.     No headache or dizziness.  No chest pain or shortness of breath.  No nausea or vomiting.  Appetite is good.  No bleeding.  No fever or chills.  All other review of systems negative.     PHYSICAL EXAMINATION:    GENERAL:  He is alert and oriented x 3.  ECOG PS of 0.   The rest of the systems not examined.     LABORATORY DATA:  Reviewed.     ASSESSMENT:    1.  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  2.  Peripheral neuropathy.  3.  History of  gastrointestinal stromal tumor.     PLAN:     1.  Investigations were all reviewed with the patient.  I explained to the patient that he has smoldering myeloma.  Because of 10% to 15% plasma cells, he fits the criteria for smoldering myeloma.  Difference between MGUS, smoldering myeloma and myeloma discussed.  He does not have any evidence of end organ damage.  2.  Discussed regarding his smoldering myeloma.  Natural history of disease explained.  We will continue to monitor him.  I will see him in 3 months' time with labs including CBC, BMP, SPEP, UPEP, free light chain, and 24-hour urine protein.  I explained to the patient that if in the future there is worsening of these markers, we will repeat bone marrow biopsy and also get a PET scan.  3.  The patient's neuropathy is not related to his smoldering myeloma.  4.  I will see him in 3 months' time.  I advised him to call us with any questions or concerns.     TOTAL FACE-TO-FACE TIME SPENT:  25 minutes, more than 50% of the time was spent in counseling and coordination of care.

## 2021-07-28 LAB — COPATH REPORT: NORMAL

## 2021-08-13 ENCOUNTER — MEDICAL CORRESPONDENCE (OUTPATIENT)
Dept: HEALTH INFORMATION MANAGEMENT | Facility: CLINIC | Age: 61
End: 2021-08-13

## 2021-08-20 ENCOUNTER — TRANSFERRED RECORDS (OUTPATIENT)
Dept: HEALTH INFORMATION MANAGEMENT | Facility: CLINIC | Age: 61
End: 2021-08-20

## 2021-08-31 ASSESSMENT — SLEEP AND FATIGUE QUESTIONNAIRES
HOW LIKELY ARE YOU TO NOD OFF OR FALL ASLEEP IN A CAR, WHILE STOPPED FOR A FEW MINUTES IN TRAFFIC: WOULD NEVER DOZE
HOW LIKELY ARE YOU TO NOD OFF OR FALL ASLEEP WHILE SITTING INACTIVE IN A PUBLIC PLACE: MODERATE CHANCE OF DOZING
HOW LIKELY ARE YOU TO NOD OFF OR FALL ASLEEP WHILE WATCHING TV: MODERATE CHANCE OF DOZING
HOW LIKELY ARE YOU TO NOD OFF OR FALL ASLEEP WHILE SITTING AND READING: MODERATE CHANCE OF DOZING
HOW LIKELY ARE YOU TO NOD OFF OR FALL ASLEEP WHILE LYING DOWN TO REST IN THE AFTERNOON WHEN CIRCUMSTANCES PERMIT: MODERATE CHANCE OF DOZING
HOW LIKELY ARE YOU TO NOD OFF OR FALL ASLEEP WHILE SITTING QUIETLY AFTER LUNCH WITHOUT ALCOHOL: MODERATE CHANCE OF DOZING
HOW LIKELY ARE YOU TO NOD OFF OR FALL ASLEEP WHILE SITTING AND TALKING TO SOMEONE: WOULD NEVER DOZE
HOW LIKELY ARE YOU TO NOD OFF OR FALL ASLEEP WHEN YOU ARE A PASSENGER IN A CAR FOR AN HOUR WITHOUT A BREAK: MODERATE CHANCE OF DOZING

## 2021-08-31 ASSESSMENT — ENCOUNTER SYMPTOMS
POOR WOUND HEALING: 0
HALLUCINATIONS: 0
FATIGUE: 0
WEIGHT LOSS: 0
SKIN CHANGES: 0
FEVER: 0
ALTERED TEMPERATURE REGULATION: 0
NIGHT SWEATS: 0
CHILLS: 0
POLYPHAGIA: 0
POLYDIPSIA: 0
DECREASED APPETITE: 0
NAIL CHANGES: 0
WEIGHT GAIN: 0
INCREASED ENERGY: 1

## 2021-09-02 VITALS — HEIGHT: 73 IN | BODY MASS INDEX: 27.96 KG/M2 | WEIGHT: 211 LBS

## 2021-09-02 ASSESSMENT — MIFFLIN-ST. JEOR: SCORE: 1825.97

## 2021-09-02 NOTE — PROGRESS NOTES
Anderson Mitchell is a 61 year old who is being evaluated via a billable video visit.      How would you like to obtain your AVS? MyChart  If the video visit is dropped, the invitation should be resent by: Text to cell phone: [phone]  Will anyone else be joining your video visit? No    Does patient have any form of state insurance?No   Do you have wifi? Yes  Do you have a smart phone/device?Yes  Can you download an josefa on your phone comfortably with out assistance including You Tube? Yes            Skyla Kraft MA    Video Start Time: 10:12 AM  Video-Visit Details    Type of service:  Video Visit    Video End Time:10:50 AM    Originating Location (pt. Location): Home    Distant Location (provider location):  @[email or web address]@     Platform used for Video Visit: [employer]      Answers for HPI/ROS submitted by the patient on 8/31/2021  General Symptoms: Yes  Skin Symptoms: Yes  HENT Symptoms: No  EYE SYMPTOMS: No  HEART SYMPTOMS: No  LUNG SYMPTOMS: No  INTESTINAL SYMPTOMS: No  URINARY SYMPTOMS: No  REPRODUCTIVE SYMPTOMS: No  SKELETAL SYMPTOMS: No  BLOOD SYMPTOMS: No  NERVOUS SYSTEM SYMPTOMS: No  MENTAL HEALTH SYMPTOMS: No  Fever: No  Loss of appetite: No  Weight loss: No  Weight gain: No  Fatigue: No  Night sweats: No  Chills: No  Increased stress: No  Excessive hunger: No  Excessive thirst: No  Feeling hot or cold when others believe the temperature is normal: No  Loss of height: No  Post-operative complications: No  Surgical site pain: No  Hallucinations: No  Change in or Loss of Energy: Yes  Hyperactivity: No  Confusion: No  Changes in hair: No  Changes in moles/birth marks: No  Itching: No  Rashes: No  Changes in nails: No  Acne: No  Change in facial hair: No  Warts: No  Non-healing sores: No  Scarring: No  Flaking of skin: No  Color changes of hands/feet in cold : No  Sun sensitivity: No  Skin thickening: No      Sleep Consultation:    Date on this visit: 9/3/2021    Anderson Mitchell is sent by No ref.  provider found for a sleep consultation regarding possible sleep apnea.    Primary Physician: Koons, Duane M     Anderson Mitchell reports nightly snoring and poor quality of sleep and daytime fatigue for last one year.     Medical history is significant for hypertension and polyneuropathy.     Anderson does snore frequently. Patient does not have a regular bed partner. There is report of snorting and poor quality of sleep.  He does not have witnessed apneas. Patient sleeps on his back. He has occasional morning dry mouth and morning headaches. Anderson has frequent sleep talking and denies any sleep walking, sleep paralysis and hypnogogic/hypnopompic hallucinations.    He has restless leg symptoms at bedtime. He is reported to be a restless sleeper and bedcovers are messed up by morning.     There is a history of dream enactment behavior for last 10 years. Episodes involve dreams of playing a sport and kicking in response. Episodes occur 2-3 times per week. He has not experienced any injury from dream enactment.     Anderson goes to sleep at 10:30 PM during the week. He wakes up at 6:00 AM without an alarm. He falls asleep in 15 minutes.  Anderson denies difficulty falling asleep.  He wakes up 1 times a night for 5 minutes before falling back to sleep.  Anderson wakes up to snorting and uncertain reasons.  On weekends, Anderson goes to sleep at 11:00 PM.  He wakes up at 7:00 AM without an alarm. He falls asleep in 15 minutes.  Patient gets an average of 7 hours of sleep per night.     Patient's [place] Sleepiness score 12/24 consistent with mild daytime sleepiness.      Anderson naps 5-6 times per week for 5-10 minutes, feels refreshed after naps. He takes some inadvertant naps.  He denies closing eyes, dozing and falling asleep while driving.  Patient was counseled on the importance of driving while alert, to pull over if drowsy, or nap before getting into the vehicle if sleepy.      He uses no caffeine.  Allergies:    Allergies   Allergen  Reactions     Cats Itching and Other (See Comments)     Grass      Nkda [No Known Drug Allergies]      No Clinical Screening - See Comments Other (See Comments)     Nuts Itching     Trees        Medications:    Current Outpatient Medications   Medication Sig Dispense Refill     ASHWAGANDHA PO        cetirizine (ZYRTEC) 10 MG tablet Take 10 mg by mouth daily       hydrochlorothiazide (HYDRODIURIL) 25 MG tablet Take 1 tablet by mouth daily.       Multiple Vitamin (MULTIVITAMIN) per tablet Take 1 tablet by mouth daily.       RABEprazole (ACIPHEX) 20 MG EC tablet Take 1 tablet as needed.  0     tamsulosin (FLOMAX) 0.4 MG capsule Take 0.4 mg by mouth daily       triamcinolone (NASACORT) 55 MCG/ACT nasal aerosol 55 sprays       VERAPAMIL HCL PO Take 240 mg by mouth          Problem List:  Patient Active Problem List    Diagnosis Date Noted     Heterozygous thalassemia 05/07/2013     Priority: Medium     Diagnosis updated by automated process. Provider to review and confirm.       Essential hypertension 05/07/2013     Priority: Medium     Problem list name updated by automated process. Provider to review       Shoulder pain 12/22/2009     Priority: Medium     Gastrointestinal stromal tumor (H) 05/05/2008     Priority: Medium        Past Medical/Surgical History:  Past Medical History:   Diagnosis Date     Anxiety 2016    not Medicated     Arthritis     tr knee and both ankles     Chronic constipation NOW    from [facility]     Fracture 2016    lower lt ankle     Gastrointestinal stromal tumor (H) 5/5/2008     Hypertension      Malignant neoplasm (H)     gastrointestinal mass-removed 5-2008     Thalassaemia trait 5/7/2013     Past Surgical History:   Procedure Laterality Date     ABDOMEN SURGERY       BONE MARROW BIOPSY, BONE SPECIMEN, NEEDLE/TROCAR N/A 7/1/2021    Procedure: BONE MARROW BIOPSY;  Surgeon: Ivette Hassan MD;  Location:  GI     COLONOSCOPY  4/6/2012    Procedure:COLONOSCOPY; Surgeon:AXEL  TAMIE JIM; Location: GI     ESOPHAGOSCOPY, GASTROSCOPY, DUODENOSCOPY (EGD), COMBINED N/A 7/30/2020    Procedure: ESOPHAGOGASTRODUODENOSCOPY, WITH BIOPSY;  Surgeon: Tejas Bradley MD;  Location:  OR     ORTHOPEDIC SURGERY         Social History:  Social History     Socioeconomic History     Marital status: Single     Spouse name: Not on file     Number of children: Not on file     Years of education: Not on file     Highest education level: Not on file   Occupational History     Not on file   Tobacco Use     Smoking status: Former Smoker     Packs/day: 0.00     Years: 0.00     Pack years: 0.00     Types: Cigars     Smokeless tobacco: Never Used     Tobacco comment: OCC. CIGAR   Substance and Sexual Activity     Alcohol use: Yes     Alcohol/week: 0.0 standard drinks     Comment: 2 a week     Drug use: No     Sexual activity: Never   Other Topics Concern     Parent/sibling w/ CABG, MI or angioplasty before 65F 55M? Not Asked   Social History Narrative     Not on file     Social Determinants of Health     Financial Resource Strain:      Difficulty of Paying Living Expenses:    Food Insecurity:      Worried About Running Out of Food in the Last Year:      Ran Out of Food in the Last Year:    Transportation Needs:      Lack of Transportation (Medical):      Lack of Transportation (Non-Medical):    Physical Activity:      Days of Exercise per Week:      Minutes of Exercise per Session:    Stress:      Feeling of Stress :    Social Connections:      Frequency of Communication with Friends and Family:      Frequency of Social Gatherings with Friends and Family:      Attends Yazidi Services:      Active Member of Clubs or Organizations:      Attends Club or Organization Meetings:      Marital Status:    Intimate Partner Violence:      Fear of Current or Ex-Partner:      Emotionally Abused:      Physically Abused:      Sexually Abused:        Family History:  Family History   Problem Relation Age of  "Onset     Hypertension Father         Descease     Respiratory Father         YUNG     Cerebrovascular Disease Father         ,     C.A.D. Father      Respiratory Brother         YUNG     Hypertension Brother      Thyroid Disease Mother         descease     Hypertension Brother      There is history of sleep apnea in his father and 2 brothers.     Review of Systems:  A complete review of systems reviewed by me is negative with the exeption of what has been mentioned in the history of present illness.      Physical Examination:  Vitals: Ht 1.854 m (6' 1\")   Wt 95.7 kg (211 lb)   BMI 27.84 kg/m    BMI= Body mass index is 27.84 kg/m .         [place] Total Score 9/2/2021   Total score - [place] 12       JAVED Total Score: 11 (09/02/21 1000)    GENERAL APPEARANCE: healthy, alert and no distress  RESP: Negative for cough or dyspnea   NEURO: mentation intact and speech normal  PSYCH: mentation appears normal and affect normal/bright    Impression/Plan:    1. Probable Obstructive sleep apnea  2. Dream enactment behavior   3. Restless leg syndrome     Patient is a 61 years old male, with a BMI of 27, medical comorbidity of hypertension, who presents with a history of snoring, nonrestorative sleep and excessive daytime fatigue.  There is an intermediate to high risk for a sleep apnea and an overnight sleep study is recommended for evaluation.    Patient gives a history of dream enactment behavior, characterized by dreams about playing a sport and kicking in response, occurring 2-3 times per month.  He is not on antidepressant therapy.  PSG will help us evaluate if there is abnormal REM EMG tone concerning for REM sleep behavioral disorder.    Patient has a history of restless leg syndrome which is not causing a significant burden at this time.  We will reassess need for pharmacotherapy for restless legs after completion of his sleep study.    Plan:    - PSG for assessment of sleep apnea and parasomnia       He will follow up " with me in approximately two weeks after his sleep study has been competed to review the results and discuss plan of care.       Polysomnography reviewed.  Obstructive sleep apnea reviewed.  Complications of untreated sleep apnea were reviewed.    I spent a total of 45 minutes for this appointment today which include time spent before, during and after the visit for patient care, counseling and coordination of care.    Dr. Morales Barron     CC: No ref. provider found

## 2021-09-02 NOTE — PATIENT INSTRUCTIONS
Your BMI is Body mass index is 27.84 kg/m .  Weight management is a personal decision.  If you are interested in exploring weight loss strategies, the following discussion covers the approaches that may be successful. Body mass index (BMI) is one way to tell whether you are at a healthy weight, overweight, or obese. It measures your weight in relation to your height.  A BMI of 18.5 to 24.9 is in the healthy range. A person with a BMI of 25 to 29.9 is considered overweight, and someone with a BMI of 30 or greater is considered obese. More than two-thirds of American adults are considered overweight or obese.  Being overweight or obese increases the risk for further weight gain. Excess weight may lead to heart disease and diabetes.  Creating and following plans for healthy eating and physical activity may help you improve your health.  Weight control is part of healthy lifestyle and includes exercise, emotional health, and healthy eating habits. Careful eating habits lifelong are the mainstay of weight control. Though there are significant health benefits from weight loss, long-term weight loss with diet alone may be very difficult to achieve- studies show long-term success with dietary management in less than 10% of people. Attaining a healthy weight may be especially difficult to achieve in those with severe obesity. In some cases, medications, devices and surgical management might be considered.  What can you do?  If you are overweight or obese and are interested in methods for weight loss, you should discuss this with your provider.     Consider reducing daily calorie intake by 500 calories.     Keep a food journal.     Avoiding skipping meals, consider cutting portions instead.    Diet combined with exercise helps maintain muscle while optimizing fat loss. Strength training is particularly important for building and maintaining muscle mass. Exercise helps reduce stress, increase energy, and improves fitness.  Increasing exercise without diet control, however, may not burn enough calories to loose weight.       Start walking three days a week 10-20 minutes at a time    Work towards walking thirty minutes five days a week     Eventually, increase the speed of your walking for 1-2 minutes at time    In addition, we recommend that you review healthy lifestyles and methods for weight loss available through the National Institutes of Health patient information sites:  http://win.niddk.nih.gov/publications/index.htm    And look into health and wellness programs that may be available through your health insurance provider, employer, local community center, or caro club.    Weight management plan: Patient was referred to their PCP to discuss a diet and exercise plan.

## 2021-09-03 ENCOUNTER — VIRTUAL VISIT (OUTPATIENT)
Dept: SLEEP MEDICINE | Facility: CLINIC | Age: 61
End: 2021-09-03
Payer: COMMERCIAL

## 2021-09-03 DIAGNOSIS — R29.818 SUSPECTED SLEEP APNEA: Primary | ICD-10-CM

## 2021-09-03 DIAGNOSIS — R06.83 SNORING: ICD-10-CM

## 2021-09-03 DIAGNOSIS — G47.52 DREAM ENACTMENT BEHAVIOR: ICD-10-CM

## 2021-09-03 DIAGNOSIS — R40.0 SLEEPINESS: ICD-10-CM

## 2021-09-03 PROCEDURE — 99204 OFFICE O/P NEW MOD 45 MIN: CPT | Mod: 95 | Performed by: INTERNAL MEDICINE

## 2021-09-08 ENCOUNTER — IMMUNIZATION (OUTPATIENT)
Dept: NURSING | Facility: CLINIC | Age: 61
End: 2021-09-08
Payer: COMMERCIAL

## 2021-09-08 PROCEDURE — 0003A PR ADMIN COVID VAC PFIZER, 3RD DOSE IMM COMP PT: CPT

## 2021-09-08 PROCEDURE — 91300 PR COVID VAC PFIZER DIL RECON 30 MCG/0.3 ML IM: CPT

## 2021-09-08 PROCEDURE — 90471 IMMUNIZATION ADMIN: CPT

## 2021-09-08 PROCEDURE — 90686 IIV4 VACC NO PRSV 0.5 ML IM: CPT

## 2021-10-13 ENCOUNTER — LAB (OUTPATIENT)
Dept: LAB | Facility: CLINIC | Age: 61
End: 2021-10-13
Payer: COMMERCIAL

## 2021-10-13 DIAGNOSIS — D47.2 SMOLDERING MYELOMA: ICD-10-CM

## 2021-10-13 LAB
ANION GAP SERPL CALCULATED.3IONS-SCNC: 6 MMOL/L (ref 3–14)
BUN SERPL-MCNC: 16 MG/DL (ref 7–30)
CALCIUM SERPL-MCNC: 8.6 MG/DL (ref 8.5–10.1)
CHLORIDE BLD-SCNC: 107 MMOL/L (ref 94–109)
CO2 SERPL-SCNC: 27 MMOL/L (ref 20–32)
COLLECT DURATION TIME UR: 24 H
CREAT 24H UR-MRATE: 2.74 G/SPEC (ref 1–2)
CREAT SERPL-MCNC: 1.18 MG/DL (ref 0.66–1.25)
CREAT UR-MCNC: 152 MG/DL
ERYTHROCYTE [DISTWIDTH] IN BLOOD BY AUTOMATED COUNT: 13.8 % (ref 10–15)
GFR SERPL CREATININE-BSD FRML MDRD: 67 ML/MIN/1.73M2
GLUCOSE BLD-MCNC: 137 MG/DL (ref 70–99)
HCT VFR BLD AUTO: 43 % (ref 40–53)
HGB BLD-MCNC: 15.4 G/DL (ref 13.3–17.7)
IGG SERPL-MCNC: 1452 MG/DL (ref 610–1616)
KAPPA LC FREE SER-MCNC: 1.53 MG/DL (ref 0.33–1.94)
KAPPA LC FREE/LAMBDA FREE SER NEPH: 0.42 {RATIO} (ref 0.26–1.65)
LAMBDA LC FREE SERPL-MCNC: 3.62 MG/DL (ref 0.57–2.63)
MCH RBC QN AUTO: 28.9 PG (ref 26.5–33)
MCHC RBC AUTO-ENTMCNC: 35.8 G/DL (ref 31.5–36.5)
MCV RBC AUTO: 81 FL (ref 78–100)
PLATELET # BLD AUTO: 212 10E3/UL (ref 150–450)
POTASSIUM BLD-SCNC: 3.4 MMOL/L (ref 3.4–5.3)
PROT 24H UR-MRATE: 0.23 G/SPEC (ref 0.04–0.23)
PROT UR-MCNC: 0.13 G/L
PROT/CREAT 24H UR: 0.09 G/G CR (ref 0–0.2)
RBC # BLD AUTO: 5.32 10E6/UL (ref 4.4–5.9)
SODIUM SERPL-SCNC: 140 MMOL/L (ref 133–144)
SPECIMEN VOL UR: 1800 ML
TOTAL PROTEIN SERUM FOR ELP: 7.3 G/DL (ref 6.8–8.8)
WBC # BLD AUTO: 4.6 10E3/UL (ref 4–11)

## 2021-10-13 PROCEDURE — 36415 COLL VENOUS BLD VENIPUNCTURE: CPT

## 2021-10-13 PROCEDURE — 85027 COMPLETE CBC AUTOMATED: CPT

## 2021-10-13 PROCEDURE — 84165 PROTEIN E-PHORESIS SERUM: CPT | Mod: 26 | Performed by: PATHOLOGY

## 2021-10-13 PROCEDURE — 81050 URINALYSIS VOLUME MEASURE: CPT

## 2021-10-13 PROCEDURE — 84165 PROTEIN E-PHORESIS SERUM: CPT | Mod: TC | Performed by: PATHOLOGY

## 2021-10-13 PROCEDURE — 83883 ASSAY NEPHELOMETRY NOT SPEC: CPT

## 2021-10-13 PROCEDURE — 84166 PROTEIN E-PHORESIS/URINE/CSF: CPT | Mod: 26 | Performed by: PATHOLOGY

## 2021-10-13 PROCEDURE — 84155 ASSAY OF PROTEIN SERUM: CPT

## 2021-10-13 PROCEDURE — 84166 PROTEIN E-PHORESIS/URINE/CSF: CPT | Mod: TC | Performed by: PATHOLOGY

## 2021-10-13 PROCEDURE — 84156 ASSAY OF PROTEIN URINE: CPT

## 2021-10-13 PROCEDURE — 82784 ASSAY IGA/IGD/IGG/IGM EACH: CPT

## 2021-10-13 PROCEDURE — 80048 BASIC METABOLIC PNL TOTAL CA: CPT

## 2021-10-13 PROCEDURE — 81050 URINALYSIS VOLUME MEASURE: CPT | Performed by: PATHOLOGY

## 2021-10-14 ENCOUNTER — ONCOLOGY VISIT (OUTPATIENT)
Dept: ONCOLOGY | Facility: CLINIC | Age: 61
End: 2021-10-14
Attending: INTERNAL MEDICINE
Payer: COMMERCIAL

## 2021-10-14 VITALS
SYSTOLIC BLOOD PRESSURE: 138 MMHG | OXYGEN SATURATION: 100 % | HEART RATE: 57 BPM | HEIGHT: 73 IN | WEIGHT: 211.6 LBS | BODY MASS INDEX: 28.04 KG/M2 | TEMPERATURE: 98.3 F | RESPIRATION RATE: 16 BRPM | DIASTOLIC BLOOD PRESSURE: 81 MMHG

## 2021-10-14 DIAGNOSIS — D47.2 SMOLDERING MYELOMA: Primary | ICD-10-CM

## 2021-10-14 LAB
ALBUMIN SERPL ELPH-MCNC: 4.3 G/DL (ref 3.7–5.1)
ALPHA1 GLOB MFR UR ELPH: 0 %
ALPHA1 GLOB SERPL ELPH-MCNC: 0.3 G/DL (ref 0.2–0.4)
ALPHA2 GLOB MFR UR ELPH: 0 %
ALPHA2 GLOB SERPL ELPH-MCNC: 0.6 G/DL (ref 0.5–0.9)
B-GLOBULIN MFR UR ELPH: 0 %
B-GLOBULIN SERPL ELPH-MCNC: 0.6 G/DL (ref 0.6–1)
GAMMA GLOB MFR UR ELPH: 0 %
GAMMA GLOB SERPL ELPH-MCNC: 1.5 G/DL (ref 0.7–1.6)
M PROTEIN MFR UR ELPH: 0 %
M PROTEIN SERPL ELPH-MCNC: 0.8 G/DL
PROT PATTERN SERPL ELPH-IMP: ABNORMAL
PROT PATTERN UR ELPH-IMP: NORMAL

## 2021-10-14 PROCEDURE — 99214 OFFICE O/P EST MOD 30 MIN: CPT | Performed by: INTERNAL MEDICINE

## 2021-10-14 ASSESSMENT — PAIN SCALES - GENERAL: PAINLEVEL: NO PAIN (0)

## 2021-10-14 ASSESSMENT — MIFFLIN-ST. JEOR: SCORE: 1828.69

## 2021-10-14 NOTE — Clinical Note
"    10/14/2021         RE: Anderson Mitchell  [address]  [place] [address]        Dear Colleague,    Thank you for referring your patient, Anderson Mitchell, to the [facility]. Please see a copy of my visit note below.    Oncology Rooming Note    October 14, 2021 9:36 AM   Anderson Mitchell is a 61 year old male who presents for:    Chief Complaint   Patient presents with     Oncology Clinic Visit     Gastrointestinal stromal tumor     Initial Vitals: /81   Pulse 57   Temp 98.3  F (36.8  C) (Oral)   Resp 16   Ht 1.854 m (6' 1\")   Wt 96 kg (211 lb 9.6 oz)   SpO2 100%   BMI 27.92 kg/m   Estimated body mass index is 27.92 kg/m  as calculated from the following:    Height as of this encounter: 1.854 m (6' 1\").    Weight as of this encounter: 96 kg (211 lb 9.6 oz). Body surface area is 2.22 meters squared.  No Pain (0) Comment: Data Unavailable   No LMP for male patient.  Allergies reviewed: Yes  Medications reviewed: Yes    Medications: Medication refills not needed today.  Pharmacy name entered into [employer]: [id number] [place] [address] AT [facility]    Clinical concerns: None      Destinee Haddad MA                HEMATOLOGY HISTORY: Mr. Mitchell is a gentleman with MGUS.  -In 2008, EGD revealed a submucosal smooth nodule 5 cm near the antrum of the stomach. Had wedge gastrectomy on 05/05/2008.  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       1.  On 02/03/2021:  -Normal calcium and creatinine.  -Normal hemoglobin A1c.  -Normal TSH.     2.  Multiple labs done on 03/15/2021:    -Normal LFT.  -Normal copper.  -Normal zinc.  -Normal vitamin D.  -SPEP revealed monoclonal peak of 0.8.  -Serum immunofixation reveals monoclonal IgG lambda.  -IgG level of 1562.     3.  On 04/01/2021:  -[place] free light chain of 1.27 and lambda free light chain of 2.88. Normal ratio.  -Urine immunofixation reveals faint " monoclonal IgG lambda.     4.  Multiple CBCs have been done in the past.  WBC, hemoglobin and platelet have been normal.  MCV has been mildly low around 77.  The patient carries a diagnosis of thalassemia trait.     5. Bone marrow biopsy on 07/01/2021 reveals 10% to 15% lambda monotypic plasma cell.  Bone marrow was 40% cellularity with trilineage hematopoiesis.  No amyloid deposits.  Flow cytometry reveals lambda monotypic plasma cells.  -FISH : Gain of 1q (1%), Gain of 11q (68%), Loss of 13q14 (35%). Monosomy 13 (15%) and rearrangement of IGH with (34%) or without (28%) gain of 5' (telomeric) signal   -46,XY[19]     6. Skeletal survey on 07/12/2021 revealed degenerative changes.  No lytic lesion or compression fracture.    SUBJECTIVE:  Mr. Mitchell is a 61-year-old gentleman with a smoldering myeloma.  He is here for followup.     Overall, he is doing good.  No headache.  No dizziness.  No chest pain.  No shortness of breath.  No abdominal pain, nausea or vomiting.  No urinary or bowel complaints.  He is very active physically.     He does have peripheral neuropathy.  It does not bother him all the time.  He gets some numbness in the fingers and the bottom of the feet.  It is mainly the bottom of feet that are sometimes bothersome.     All other review of systems negative.     PHYSICAL EXAMINATION:   GENERAL:  Alert and oriented x 3.   VITAL SIGNS:  Reviewed.  ECOG PS of 0.     EYES:  No icterus.   THROAT:  No ulcer. No thrush.   NECK:  Supple. No lymphadenopathy. No thyromegaly.   AXILLAE:  No lymphadenopathy.   LUNGS:  Good air entry bilaterally.  No crackles or wheezing.   HEART:  Regular.  No murmur.   GI: Abdomen is soft.  Nontender. No mass.   EXTREMITIES:  No pedal edema.  No calf swelling or tenderness.   SKIN:  No rash.      LABS:  CBC, BMP, free light chain, IgG level and monoclonal peak reviewed.     ASSESSMENT:    1.  A 61-year-old gentleman with smoldering myeloma, stable.  2.  Peripheral  neuropathy, stable.    3.  History of GIST.     PLAN:    1.  Labs were all reviewed with him.  Smoldering myeloma is stable.    2.  I explained to the patient we will continue to monitor him as it can progress to multiple myeloma.  3.  He has neuropathy.  It does not bother him all the time.  If neuropathy continues to get worse, we may consider treating the smoldering myeloma.  4.  He had few questions, which were all answered.    5.  I will see him in February with labs.    Visit Date: 10/14/2021    SUBJECTIVE:  Mr. Mustafa is a 61-year-old gentleman with a smoldering myeloma.  He is here for followup.    Overall, he is doing good.  No headache.  No dizziness.  No chest pain.  No shortness of breath.  No abdominal pain, nausea, or vomiting.  No urinary or bowel complaints.  He is very active and runs a few times a week.    He does have peripheral neuropathy.  It does not bother him all the time.  He gets some numbness in the fingers and the bottom of the feet.  It is mainly the bottom of feet that are sometimes bothersome.    All other review of systems negative.    PHYSICAL EXAMINATION:  Normal.  ECOG PS of 0.    LABS:  CBC, BMP, free light chain, IgG level, and monoclonal peak reviewed.    ASSESSMENT:    1.  A 61-year-old gentleman with smoldering myeloma, stable.  2.  Peripheral neuropathy, stable.    3.  History of GIST.    PLAN:    1.  Labs were all reviewed with him.  Smoldering myeloma is stable.    2.  I explained to the patient we will continue to monitor him, as it can progress to multiple myeloma.  3.  He has neuropathy.  It does not bother him all the time.  If neuropathy continues to get worse, we may consider treating, even the smoldering myeloma.  4.  He had few questions, which were all answered.    5.  I will see him in February with labs.    Jackie Braden MD        D: 10/14/2021   T: 10/14/2021   MT: [id number]    Name:     LITZY MUSTAFA  MRN:      [id number]        Account:     [id number]   :      1960           Visit Date: 10/14/2021     Document: [id number]      Again, thank you for allowing me to participate in the care of your patient.        Sincerely,        Jackie Braden MD

## 2021-10-14 NOTE — PROGRESS NOTES
"Oncology Rooming Note    October 14, 2021 9:36 AM   Anderson Mitchell is a 61 year old male who presents for:    Chief Complaint   Patient presents with     Oncology Clinic Visit     Gastrointestinal stromal tumor     Initial Vitals: /81   Pulse 57   Temp 98.3  F (36.8  C) (Oral)   Resp 16   Ht 1.854 m (6' 1\")   Wt 96 kg (211 lb 9.6 oz)   SpO2 100%   BMI 27.92 kg/m   Estimated body mass index is 27.92 kg/m  as calculated from the following:    Height as of this encounter: 1.854 m (6' 1\").    Weight as of this encounter: 96 kg (211 lb 9.6 oz). Body surface area is 2.22 meters squared.  No Pain (0) Comment: Data Unavailable   No LMP for male patient.  Allergies reviewed: Yes  Medications reviewed: Yes    Medications: Medication refills not needed today.  [facility] [id number] [place] [address] [facility]    Clinical concerns: None      Destinee Haddad MA              "

## 2021-10-17 NOTE — PROGRESS NOTES
HEMATOLOGY HISTORY: Mr. Mitchell is a gentleman with MGUS.  -In 2008, EGD revealed a submucosal smooth nodule 5 cm near the antrum of the stomach. Had wedge gastrectomy on 05/05/2008.  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       1.  On 02/03/2021:  -Normal calcium and creatinine.  -Normal hemoglobin A1c.  -Normal TSH.     2.  Multiple labs done on 03/15/2021:    -Normal LFT.  -Normal copper.  -Normal zinc.  -Normal vitamin D.  -SPEP revealed monoclonal peak of 0.8.  -Serum immunofixation reveals monoclonal IgG lambda.  -IgG level of 1562.     3.  On 04/01/2021:  -[place] free light chain of 1.27 and lambda free light chain of 2.88. Normal ratio.  -Urine immunofixation reveals faint monoclonal IgG lambda.     4.  Multiple CBCs have been done in the past.  WBC, hemoglobin and platelet have been normal.  MCV has been mildly low around 77.  The patient carries a diagnosis of thalassemia trait.     5. Bone marrow biopsy on 07/01/2021 reveals 10% to 15% lambda monotypic plasma cell.  Bone marrow was 40% cellularity with trilineage hematopoiesis.  No amyloid deposits.  Flow cytometry reveals lambda monotypic plasma cells.  -FISH : Gain of 1q (1%), Gain of 11q (68%), Loss of 13q14 (35%). Monosomy 13 (15%) and rearrangement of IGH with (34%) or without (28%) gain of 5' (telomeric) signal   -46,XY[19]     6. Skeletal survey on 07/12/2021 revealed degenerative changes.  No lytic lesion or compression fracture.    SUBJECTIVE:  Mr. Mitchell is a 61-year-old gentleman with a smoldering myeloma.  He is here for followup.     Overall, he is doing good.  No headache.  No dizziness.  No chest pain.  No shortness of breath.  No abdominal pain, nausea or vomiting.  No urinary or bowel complaints.  He is very active physically.     He does have peripheral neuropathy.  It does not bother him all the time.  He gets some numbness in the fingers and the bottom of the feet.  It is mainly the bottom of feet that are sometimes  bothersome.     All other review of systems negative.     PHYSICAL EXAMINATION:   GENERAL:  Alert and oriented x 3.   VITAL SIGNS:  Reviewed.  ECOG PS of 0.     EYES:  No icterus.   THROAT:  No ulcer. No thrush.   NECK:  Supple. No lymphadenopathy. No thyromegaly.   AXILLAE:  No lymphadenopathy.   LUNGS:  Good air entry bilaterally.  No crackles or wheezing.   HEART:  Regular.  No murmur.   GI: Abdomen is soft.  Nontender. No mass.   EXTREMITIES:  No pedal edema.  No calf swelling or tenderness.   SKIN:  No rash.      LABS:  CBC, BMP, free light chain, IgG level and monoclonal peak reviewed.     ASSESSMENT:    1.  A 61-year-old gentleman with smoldering myeloma, stable.  2.  Peripheral neuropathy, stable.    3.  History of GIST.     PLAN:    1.  Labs were all reviewed with him.  Smoldering myeloma is stable.    2.  I explained to the patient we will continue to monitor him as it can progress to multiple myeloma.  3.  He has neuropathy.  It does not bother him all the time.  If neuropathy continues to get worse, we may consider treating the smoldering myeloma.  4.  He had few questions, which were all answered.    5.  I will see him in February with labs.

## 2021-10-19 ENCOUNTER — THERAPY VISIT (OUTPATIENT)
Dept: SLEEP MEDICINE | Facility: CLINIC | Age: 61
End: 2021-10-19
Payer: COMMERCIAL

## 2021-10-19 DIAGNOSIS — G47.52 DREAM ENACTMENT BEHAVIOR: ICD-10-CM

## 2021-10-19 DIAGNOSIS — R40.0 SLEEPINESS: ICD-10-CM

## 2021-10-19 DIAGNOSIS — R06.83 SNORING: ICD-10-CM

## 2021-10-19 DIAGNOSIS — R29.818 SUSPECTED SLEEP APNEA: ICD-10-CM

## 2021-10-19 PROCEDURE — 95810 POLYSOM 6/> YRS 4/> PARAM: CPT | Performed by: INTERNAL MEDICINE

## 2021-10-20 NOTE — PATIENT INSTRUCTIONS
[facility]    1. Your sleep study will be reviewed by a sleep physician within the next few days.     2. Please follow up in the sleep clinic as scheduled, or, make an appointment with your sleep provider to be seen within two weeks to discuss the results of the sleep study.    3. If you have any questions or problems with your treatment plan, please contact your sleep clinic provider at [phone] to further manage your condition.    4. Please review your attached medication list, and, at your follow-up appointment advise your sleep clinic provider about any changes.    5. Go to [email or web address] for more information about your sleep problems.    Gladys He, RPSGT  October 20, 2021

## 2021-10-24 NOTE — PROCEDURES
"SLEEP STUDY INTERPRETATION  DIAGNOSTIC POLYSOMNOGRAPHY REPORT      Patient: LITZY MUSTAFA  YOB: 1960  Study Date: 10/19/2021  MRN: [id number]  Referring Provider: Self  Ordering Provider: MD Barron Rakesh    Indications for Polysomnography: The patient is a 61 year old Male who is 6' 1\" and weighs 211.0 lbs. His BMI is 28.0, [place] sleepiness scale 12 and neck circumference is 40 cm. Relevant medical history includes hypertension. A diagnostic polysomnogram was performed to evaluate for sleep apnea/ RBD.    Polysomnogram Data: A full night polysomnogram recorded the standard physiologic parameters including EEG, EOG, EMG, ECG, nasal and oral airflow. Respiratory parameters of chest and abdominal movements were recorded with respiratory inductance plethysmography. Oxygen saturation was recorded by pulse oximetry. Hypopnea scoring rule used: 1B 4%.    Sleep Architecture: Fragmented sleep.   The total recording time of the polysomnogram was 476.8 minutes. The total sleep time was 388.5 minutes. Sleep latency was normal at 11.9 minutes without the use of a sleep aid. REM latency was 75.5 minutes. Arousal index was increased at 29.2 arousals per hour. Sleep efficiency was decreased at 81.5%. Wake after sleep onset was 68.0 minutes. The patient spent 9.4% of total sleep time in Stage N1, 41.6% in Stage N2, 25.2% in Stage N3, and 23.8% in REM. Time in REM supine was 92.5 minutes.    Respiration: Mild obstructive sleep apnea.     Events ? The polysomnogram revealed a presence of 13 obstructive, 9 central, and 2 mixed apneas resulting in an apnea index of 3.7 events per hour. There were 31 obstructive hypopneas and 3 central hypopneas resulting in an obstructive hypopnea index of 4.8 and central hypopnea index of 0.5 events per hour. The combined apnea/hypopnea index was 9.0 events per hour (central apnea/hypopnea index was 1.9 events per hour). The REM AHI was 28.5 events per hour. The supine AHI was 9.0 " events per hour. The RERA index was 2.3 events per hour.  The RDI was 11.3 events per hour.    Snoring - was reported as mild.    Respiratory rate and pattern - was notable for normal respiratory rate and pattern.    Sustained Sleep Associated Hypoventilation - Transcutaneous carbon dioxide monitoring was not used; however significant hypoventilation was not suggested by oximetry.    Sleep Associated Hypoxemia - (Greater than 5 minutes O2 sat at or below 88%) was not present. Baseline oxygen saturation was 95.7%. Lowest oxygen saturation was 85.2%. Time spent less than or equal to 88% was 0.8 minutes. Time spent less than or equal to 89% was 1.5 minutes.    Movement Activity:     Periodic Limb Activity - There were 63 PLMs during the entire study. The PLM index was 9.7 movements per hour. The PLM Arousal Index was - per hour.    REM EMG Activity - Excessive transient muscle activity was present.    Nocturnal Behavior - Abnormal sleep related behaviors were not noted during/arising out of NREM / REM sleep.    Bruxism - None apparent.    Cardiac Summary:  Sinus rhythm.   The average pulse rate was 51.7 bpm. The minimum pulse rate was 43.9 bpm while the maximum pulse rate was 73.1 bpm.  Arrhythmias were not noted.    Assessment:     Mild obstructive sleep apnea with respiratory events predominantly occurring in REM sleep.     There was no occurrence of dream enactment behavior. Chin EMG tone in REM was atonic. However, transient muscle activity was present in limb EMG during REM. Abnormal REM EMG tone was mostly seen in association with sleep disordered breathing.     Recommendations:    Depending on clinical indications for treatment of mild obstructive sleep apnea, following options can be considered;      Treatment could be empirically initiated with Auto?titrating PAP therapy with a range of 5 to 15 cmH2O. Recommend clinical follow up with sleep management team.    Patient may be a candidate for dental appliance  through referral to Sleep Dentistry for the treatment of obstructive sleep apnea.    If devices are not acceptable or effective, patient may benefit from evaluation of possible surgical options. If he is interested, would recommend referral to specialized ENT-Sleep provider.    Suggest optimizing sleep schedule and avoiding sleep deprivation.    Clinical correlation regarding REM sleep without atonia. Ensure safety in sleep environment and monitor for any persisting dream enactment behavior after treatment of sleep apnea.      Diagnostic Codes:   Obstructive Sleep Apnea G47.33  Repetitive Intrusions Into Sleep F51.8    10/19/2021 [facility] Sleep Study (211.0 lbs) - AHI 9.0, RDI 11.3, Supine AHI 9.0, REM AHI 28.5, Low O2 85.2%, Time Spent ?88% 0.8 minutes / Time Spent ?89% 1.5 minutes.    _____________________________________   Electronically Signed By: Morales Barron MD 10/24/2021

## 2021-10-25 LAB — SLPCOMP: NORMAL

## 2021-11-08 ENCOUNTER — VIRTUAL VISIT (OUTPATIENT)
Dept: SLEEP MEDICINE | Facility: CLINIC | Age: 61
End: 2021-11-08
Payer: COMMERCIAL

## 2021-11-08 DIAGNOSIS — G47.33 OSA (OBSTRUCTIVE SLEEP APNEA): Primary | ICD-10-CM

## 2021-11-08 PROCEDURE — 99212 OFFICE O/P EST SF 10 MIN: CPT | Mod: 95 | Performed by: INTERNAL MEDICINE

## 2021-11-08 NOTE — PROGRESS NOTES
Charan العلي is a 61 year old who is being evaluated via a billable phone visit.      Phone Start Time: 11:17 AM  Video-Visit Details    Type of service:  Video Visit    Video End Time:11:25 AM    Originating Location (pt. Location): Home    Distant Location (provider location):  [facility]     Sleep Study Follow-Up Visit:    Date on this visit: 11/8/2021    Anderson Mitchell comes in today for follow-up of his sleep study done on 10/19/21 at the [facility] [place] for possible sleep apnea.    Sleep latency 11.9 minutes without Ambien.  REM achieved.   REM latency 75.5 minutes.  Sleep efficiency 81.5%. Total sleep time 388.5 minutes.    Sleep architecture:  Stage 1, 9.4% (5%), stage 2, 41.6% (45-55%), stage 3, 25.2% (15-20%), stage REM, 23.8% (20-25%).  AHI was 9, with desaturations. RDI 11.3.  REM AHI 28.5, consistent with moderate REM YUNG.  Supine AHI 9, consistent with mild SUPINE YUNG.  Periodic Limb Movement Index 9./hour.       These findings were reviewed with patient.     Past medical/surgical history, family history, social history, medications and allergies were reviewed.      Problem List:  Patient Active Problem List    Diagnosis Date Noted     Heterozygous thalassemia 05/07/2013     Priority: Medium     Diagnosis updated by automated process. Provider to review and confirm.       Essential hypertension 05/07/2013     Priority: Medium     Problem list name updated by automated process. Provider to review       Shoulder pain 12/22/2009     Priority: Medium     Gastrointestinal stromal tumor (H) 05/05/2008     Priority: Medium        Impression/Plan:    1. Obstructive sleep apnea  2. Hypertension     Patient has mild sleep apnea with a sleep apnea events predominantly occurring in REM sleep.  There was no evidence of a sustained increase in REM EMG tone characteristic of REM sleep behavioral disorder.  Increased EMG tone in REM was mostly seen in association with sleep  disordered breathing.    We reviewed sleep apnea, consequences and management options.  CPAP and dental appliance were reviewed in detail.  Patient opts for oral appliance therapy.    He was advised to monitor progress of dream enactment behavior after treating sleep apnea and follow-up with me in about 6 months.    Plan:     - Referral to dental sleep medicine       He will follow up with me in about 6 month(s).     Dr. Morales Barron     CC: Koons, Duane M

## 2022-02-07 ENCOUNTER — ONCOLOGY VISIT (OUTPATIENT)
Dept: ONCOLOGY | Facility: CLINIC | Age: 62
End: 2022-02-07
Attending: INTERNAL MEDICINE
Payer: COMMERCIAL

## 2022-02-07 ENCOUNTER — LAB (OUTPATIENT)
Dept: INFUSION THERAPY | Facility: CLINIC | Age: 62
End: 2022-02-07
Attending: INTERNAL MEDICINE
Payer: COMMERCIAL

## 2022-02-07 DIAGNOSIS — D47.2 SMOLDERING MYELOMA: Primary | ICD-10-CM

## 2022-02-07 DIAGNOSIS — D47.2 SMOLDERING MYELOMA: ICD-10-CM

## 2022-02-07 LAB
ANION GAP SERPL CALCULATED.3IONS-SCNC: 5 MMOL/L (ref 3–14)
BUN SERPL-MCNC: 15 MG/DL (ref 7–30)
CALCIUM SERPL-MCNC: 8.8 MG/DL (ref 8.5–10.1)
CHLORIDE BLD-SCNC: 109 MMOL/L (ref 94–109)
CO2 SERPL-SCNC: 27 MMOL/L (ref 20–32)
CREAT SERPL-MCNC: 1.05 MG/DL (ref 0.66–1.25)
ERYTHROCYTE [DISTWIDTH] IN BLOOD BY AUTOMATED COUNT: 13.9 % (ref 10–15)
GFR SERPL CREATININE-BSD FRML MDRD: 82 ML/MIN/1.73M2
GLUCOSE BLD-MCNC: 135 MG/DL (ref 70–99)
HCT VFR BLD AUTO: 40.5 % (ref 40–53)
HGB BLD-MCNC: 14.2 G/DL (ref 13.3–17.7)
MCH RBC QN AUTO: 28.7 PG (ref 26.5–33)
MCHC RBC AUTO-ENTMCNC: 35.1 G/DL (ref 31.5–36.5)
MCV RBC AUTO: 82 FL (ref 78–100)
PLATELET # BLD AUTO: 214 10E3/UL (ref 150–450)
POTASSIUM BLD-SCNC: 3.4 MMOL/L (ref 3.4–5.3)
RBC # BLD AUTO: 4.95 10E6/UL (ref 4.4–5.9)
SODIUM SERPL-SCNC: 141 MMOL/L (ref 133–144)
TOTAL PROTEIN SERUM FOR ELP: 6.9 G/DL (ref 6.8–8.8)
WBC # BLD AUTO: 5 10E3/UL (ref 4–11)

## 2022-02-07 PROCEDURE — 85027 COMPLETE CBC AUTOMATED: CPT | Performed by: INTERNAL MEDICINE

## 2022-02-07 PROCEDURE — 82784 ASSAY IGA/IGD/IGG/IGM EACH: CPT | Performed by: INTERNAL MEDICINE

## 2022-02-07 PROCEDURE — 84155 ASSAY OF PROTEIN SERUM: CPT | Performed by: INTERNAL MEDICINE

## 2022-02-07 PROCEDURE — G0463 HOSPITAL OUTPT CLINIC VISIT: HCPCS

## 2022-02-07 PROCEDURE — 84165 PROTEIN E-PHORESIS SERUM: CPT | Mod: TC | Performed by: PATHOLOGY

## 2022-02-07 PROCEDURE — 84165 PROTEIN E-PHORESIS SERUM: CPT | Mod: 26 | Performed by: PATHOLOGY

## 2022-02-07 PROCEDURE — 36415 COLL VENOUS BLD VENIPUNCTURE: CPT

## 2022-02-07 PROCEDURE — 99213 OFFICE O/P EST LOW 20 MIN: CPT | Performed by: INTERNAL MEDICINE

## 2022-02-07 PROCEDURE — 83521 IG LIGHT CHAINS FREE EACH: CPT | Performed by: INTERNAL MEDICINE

## 2022-02-07 PROCEDURE — 80048 BASIC METABOLIC PNL TOTAL CA: CPT | Performed by: INTERNAL MEDICINE

## 2022-02-07 NOTE — PROGRESS NOTES
HEMATOLOGY HISTORY: Mr. Mitchell is a gentleman with smoldering myeloma.  -In 2008, EGD revealed a submucosal smooth nodule 5 cm near the antrum of the stomach. Had wedge gastrectomy on 05/05/2008.  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       1.  On 02/03/2021:  -Normal calcium and creatinine.  -Normal hemoglobin A1c.  -Normal TSH.     2.  Multiple labs done on 03/15/2021:    -Normal LFT.  -Normal copper.  -Normal zinc.  -Normal vitamin D.  -SPEP revealed monoclonal peak of 0.8.  -Serum immunofixation reveals monoclonal IgG lambda.  -IgG level of 1562.     3.  On 04/01/2021:  -[place] free light chain of 1.27 and lambda free light chain of 2.88. Normal ratio.  -Urine immunofixation reveals faint monoclonal IgG lambda.     4.  Multiple CBCs have been done in the past.  WBC, hemoglobin and platelet have been normal.  MCV has been mildly low around 77.  The patient carries a diagnosis of thalassemia trait.     5. Bone marrow biopsy on 07/01/2021 reveals 10% to 15% lambda monotypic plasma cell.  Bone marrow was 40% cellularity with trilineage hematopoiesis.  No amyloid deposits.  Flow cytometry reveals lambda monotypic plasma cells.  -FISH : Gain of 1q (1%), Gain of 11q (68%), Loss of 13q14 (35%). Monosomy 13 (15%) and rearrangement of IGH with (34%) or without (28%) gain of 5' (telomeric) signal   -46,XY[19]     6. Skeletal survey on 07/12/2021 revealed degenerative changes.  No lytic lesion or compression fracture.     SUBJECTIVE:  Mr. Mitchell is a 61-year-old gentleman with smoldering myeloma.      Overall, he is doing good.  No headache.  No dizziness.  No chest pain.  No shortness of breath.  No abdominal pain, nausea or vomiting.  No urinary or bowel complaints.  He is very active physically and walks about 12,000 steps a day.     He has peripheral neuropathy.  It does not bother him all the time.  He gets some numbness in the fingers and the bottom of the feet.  It is mainly the bottom of feet that  are sometimes bothersome.    Patient recently was diagnosed with sleep apnea.  He is using a dental device.     All other review of systems negative.     PHYSICAL EXAMINATION:   GENERAL:  Alert and oriented x 3.   VITAL SIGNS:  Reviewed.  ECOG PS of 0.     Rest of the system not examined.    LABS:  CBC, BMP, free light chain, IgG level and monoclonal peak reviewed.     ASSESSMENT:    1.  A 61-year-old gentleman with smoldering myeloma, which is stable.  2.  Peripheral neuropathy, stable.    3.  History of GIST.  4.  Obstructive sleep apnea.     PLAN:    1.  Discussed regarding smoldering myeloma.  Explained to him that it is stable. Labs were all reviewed with him.   2.  He has neuropathy.  Neuropathy can get worse as smoldering myeloma progresses to myeloma.  He will let us know if it gets worse.    3.  He had a few questions, which were all answered. I will see him in 6 months with labs.

## 2022-02-07 NOTE — LETTER
"    2/7/2022         RE: Anderson Mitchell  [address]  [facility] [address]        Dear Colleague,    Thank you for referring your patient, Anderson Mitchell, to the [facility] CANCER [facility]. Please see a copy of my visit note below.    Oncology Rooming Note    February 7, 2022 2:38 PM   Anderson Mitchell is a 61 year old male who presents for:    Chief Complaint   Patient presents with     Oncology Clinic Visit     Initial Vitals: There were no vitals taken for this visit. Estimated body mass index is 27.92 kg/m  as calculated from the following:    Height as of 10/14/21: 1.854 m (6' 1\").    Weight as of 10/14/21: 96 kg (211 lb 9.6 oz). There is no height or weight on file to calculate BSA.  Data Unavailable Comment: Data Unavailable   No LMP for male patient.  Allergies reviewed: Yes  Medications reviewed: Yes    Medications: Medication refills not needed today.  Pharmacy name entered into [id number] [place] [address] [facility]    Clinical concerns: no      Emma Bernstein, [facility]              HEMATOLOGY HISTORY: Mr. Mitchell is a gentleman with smoldering myeloma.  -In 2008, EGD revealed a submucosal smooth nodule 5 cm near the antrum of the stomach. Had wedge gastrectomy on 05/05/2008.  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       1.  On 02/03/2021:  -Normal calcium and creatinine.  -Normal hemoglobin A1c.  -Normal TSH.     2.  Multiple labs done on 03/15/2021:    -Normal LFT.  -Normal copper.  -Normal zinc.  -Normal vitamin D.  -SPEP revealed monoclonal peak of 0.8.  -Serum immunofixation reveals monoclonal IgG lambda.  -IgG level of 1562.     3.  On 04/01/2021:  -[place] free light chain of 1.27 and lambda free light chain of 2.88. Normal ratio.  -Urine immunofixation reveals faint monoclonal IgG lambda.     4.  Multiple CBCs have been done in the past.  WBC, hemoglobin and platelet have been normal.  MCV has " been mildly low around 77.  The patient carries a diagnosis of thalassemia trait.     5. Bone marrow biopsy on 07/01/2021 reveals 10% to 15% lambda monotypic plasma cell.  Bone marrow was 40% cellularity with trilineage hematopoiesis.  No amyloid deposits.  Flow cytometry reveals lambda monotypic plasma cells.  -FISH : Gain of 1q (1%), Gain of 11q (68%), Loss of 13q14 (35%). Monosomy 13 (15%) and rearrangement of IGH with (34%) or without (28%) gain of 5' (telomeric) signal   -46,XY[19]     6. Skeletal survey on 07/12/2021 revealed degenerative changes.  No lytic lesion or compression fracture.     SUBJECTIVE:  Mr. Mitchell is a 61-year-old gentleman with smoldering myeloma.      Overall, he is doing good.  No headache.  No dizziness.  No chest pain.  No shortness of breath.  No abdominal pain, nausea or vomiting.  No urinary or bowel complaints.  He is very active physically and walks about 12,000 steps a day.     He has peripheral neuropathy.  It does not bother him all the time.  He gets some numbness in the fingers and the bottom of the feet.  It is mainly the bottom of feet that are sometimes bothersome.    Patient recently was diagnosed with sleep apnea.  He is using a dental device.     All other review of systems negative.     PHYSICAL EXAMINATION:   GENERAL:  Alert and oriented x 3.   VITAL SIGNS:  Reviewed.  ECOG PS of 0.     Rest of the system not examined.    LABS:  CBC, BMP, free light chain, IgG level and monoclonal peak reviewed.     ASSESSMENT:    1.  A 61-year-old gentleman with smoldering myeloma, which is stable.  2.  Peripheral neuropathy, stable.    3.  History of GIST.  4.  Obstructive sleep apnea.     PLAN:    1.  Discussed regarding smoldering myeloma.  Explained to him that it is stable. Labs were all reviewed with him.   2.  He has neuropathy.  Neuropathy can get worse as smoldering myeloma progresses to myeloma.  He will let us know if it gets worse.    3.  He had a few questions, which  were all answered. I will see him in 6 months with labs.      Again, thank you for allowing me to participate in the care of your patient.        Sincerely,        Jackie Braden MD

## 2022-02-07 NOTE — PROGRESS NOTES
"Oncology Rooming Note    February 7, 2022 2:38 PM   Anderson Mitchell is a 61 year old male who presents for:    Chief Complaint   Patient presents with     Oncology Clinic Visit     Initial Vitals: There were no vitals taken for this visit. Estimated body mass index is 27.92 kg/m  as calculated from the following:    Height as of 10/14/21: 1.854 m (6' 1\").    Weight as of 10/14/21: 96 kg (211 lb 9.6 oz). There is no height or weight on file to calculate BSA.  Data Unavailable Comment: Data Unavailable   No LMP for male patient.  Allergies reviewed: Yes  Medications reviewed: Yes    Medications: Medication refills not needed today.  Pharmacy name entered into [employer] [place] [id number] [place] [address] [facility]    Clinical concerns: no      Emma Bernstein CMA            "

## 2022-02-07 NOTE — PROGRESS NOTES
Medical Assistant Note:  Anderson Mitchell presents today for blood draw.    Patient seen by provider today: Yes: Dr Braden.   present during visit today: Not Applicable.    Concerns: No Concerns.    Procedure:  Lab draw site: LAc, Needle type: BF, Gauge: 23g.    Post Assessment:  Labs drawn without difficulty: Yes.    Discharge Plan:  Departure Mode: Ambulatory.    Face to Face Time: 5.    Emma Bernstein, CMA

## 2022-02-08 LAB
ALBUMIN SERPL ELPH-MCNC: 4.3 G/DL (ref 3.7–5.1)
ALPHA1 GLOB SERPL ELPH-MCNC: 0.2 G/DL (ref 0.2–0.4)
ALPHA2 GLOB SERPL ELPH-MCNC: 0.5 G/DL (ref 0.5–0.9)
B-GLOBULIN SERPL ELPH-MCNC: 0.6 G/DL (ref 0.6–1)
GAMMA GLOB SERPL ELPH-MCNC: 1.3 G/DL (ref 0.7–1.6)
IGG SERPL-MCNC: 1421 MG/DL (ref 610–1616)
KAPPA LC FREE SER-MCNC: 1.45 MG/DL (ref 0.33–1.94)
KAPPA LC FREE/LAMBDA FREE SER NEPH: 0.38 {RATIO} (ref 0.26–1.65)
LAMBDA LC FREE SERPL-MCNC: 3.77 MG/DL (ref 0.57–2.63)
M PROTEIN SERPL ELPH-MCNC: 0.8 G/DL
PROT PATTERN SERPL ELPH-IMP: ABNORMAL

## 2022-02-09 NOTE — RESULT ENCOUNTER NOTE
Dear  Paula,    Blood test is stable. Smoldering myeloma is stable.    Please, call me with any questions.    Jackie Braden MD

## 2022-02-13 ENCOUNTER — HEALTH MAINTENANCE LETTER (OUTPATIENT)
Age: 62
End: 2022-02-13

## 2022-02-15 DIAGNOSIS — M25.572 PAIN IN JOINT INVOLVING ANKLE AND FOOT, LEFT: Primary | ICD-10-CM

## 2022-03-15 ENCOUNTER — ANCILLARY PROCEDURE (OUTPATIENT)
Dept: GENERAL RADIOLOGY | Facility: CLINIC | Age: 62
End: 2022-03-15
Attending: ORTHOPAEDIC SURGERY
Payer: COMMERCIAL

## 2022-03-15 DIAGNOSIS — M25.572 PAIN IN JOINT INVOLVING ANKLE AND FOOT, LEFT: ICD-10-CM

## 2022-03-15 PROCEDURE — 77002 NEEDLE LOCALIZATION BY XRAY: CPT | Performed by: RADIOLOGY

## 2022-03-15 PROCEDURE — 20605 DRAIN/INJ JOINT/BURSA W/O US: CPT | Mod: LT | Performed by: RADIOLOGY

## 2022-03-15 RX ORDER — TRIAMCINOLONE ACETONIDE 40 MG/ML
40 INJECTION, SUSPENSION INTRA-ARTICULAR; INTRAMUSCULAR ONCE
Status: COMPLETED | OUTPATIENT
Start: 2022-03-15 | End: 2022-03-15

## 2022-03-15 RX ORDER — BUPIVACAINE HYDROCHLORIDE 2.5 MG/ML
25 INJECTION, SOLUTION EPIDURAL; INFILTRATION; INTRACAUDAL ONCE
Status: COMPLETED | OUTPATIENT
Start: 2022-03-15 | End: 2022-03-15

## 2022-03-15 RX ORDER — IOPAMIDOL 408 MG/ML
20 INJECTION, SOLUTION INTRATHECAL ONCE
Status: COMPLETED | OUTPATIENT
Start: 2022-03-15 | End: 2022-03-15

## 2022-03-15 RX ORDER — LIDOCAINE HYDROCHLORIDE 10 MG/ML
30 INJECTION, SOLUTION EPIDURAL; INFILTRATION; INTRACAUDAL; PERINEURAL ONCE
Status: COMPLETED | OUTPATIENT
Start: 2022-03-15 | End: 2022-03-15

## 2022-03-15 RX ADMIN — LIDOCAINE HYDROCHLORIDE 5 ML: 10 INJECTION, SOLUTION EPIDURAL; INFILTRATION; INTRACAUDAL; PERINEURAL at 10:00

## 2022-03-15 RX ADMIN — TRIAMCINOLONE ACETONIDE 40 MG: 40 INJECTION, SUSPENSION INTRA-ARTICULAR; INTRAMUSCULAR at 10:00

## 2022-03-15 RX ADMIN — BUPIVACAINE HYDROCHLORIDE 10 MG: 2.5 INJECTION, SOLUTION EPIDURAL; INFILTRATION; INTRACAUDAL at 10:00

## 2022-03-15 RX ADMIN — IOPAMIDOL 1 ML: 408 INJECTION, SOLUTION INTRATHECAL at 09:59

## 2022-03-15 NOTE — DISCHARGE INSTRUCTIONS
Post procedure Instructions for Joint Injections:    o The steroid is mixed with a numbing medicine. You may feel numbness for 3-4 hours.  o It usually takes 3-14 days for the steroids to work.  If after 14 days you don't feel any relief please notify your provider.  o You may feel some tightness in the joint. This should only last a few hours.  o Try to rest for the next 12 hours. You can go back to normal activities the day after your procedure.  o Do not submerge injection site in water for 24 hours, (no baths. pools, hot tubs). Showers are OK.         o Notify your primary doctor if you have any questions or concerns.     If you have urgent concerns after hours regarding your procedure you can call [phone] and ask for the Radiologist on call.

## 2022-08-08 ENCOUNTER — LAB (OUTPATIENT)
Dept: INFUSION THERAPY | Facility: CLINIC | Age: 62
End: 2022-08-08
Attending: INTERNAL MEDICINE
Payer: COMMERCIAL

## 2022-08-08 ENCOUNTER — ONCOLOGY VISIT (OUTPATIENT)
Dept: ONCOLOGY | Facility: CLINIC | Age: 62
End: 2022-08-08
Attending: INTERNAL MEDICINE
Payer: COMMERCIAL

## 2022-08-08 VITALS
HEART RATE: 51 BPM | HEIGHT: 73 IN | OXYGEN SATURATION: 100 % | WEIGHT: 217 LBS | RESPIRATION RATE: 16 BRPM | BODY MASS INDEX: 28.76 KG/M2 | SYSTOLIC BLOOD PRESSURE: 147 MMHG | DIASTOLIC BLOOD PRESSURE: 74 MMHG

## 2022-08-08 DIAGNOSIS — D47.2 SMOLDERING MYELOMA: ICD-10-CM

## 2022-08-08 DIAGNOSIS — D47.2 SMOLDERING MYELOMA: Primary | ICD-10-CM

## 2022-08-08 LAB
ANION GAP SERPL CALCULATED.3IONS-SCNC: 7 MMOL/L (ref 3–14)
BUN SERPL-MCNC: 15 MG/DL (ref 7–30)
CALCIUM SERPL-MCNC: 8.9 MG/DL (ref 8.5–10.1)
CHLORIDE BLD-SCNC: 106 MMOL/L (ref 94–109)
CO2 SERPL-SCNC: 28 MMOL/L (ref 20–32)
CREAT SERPL-MCNC: 1.02 MG/DL (ref 0.66–1.25)
ERYTHROCYTE [DISTWIDTH] IN BLOOD BY AUTOMATED COUNT: 14.4 % (ref 10–15)
GFR SERPL CREATININE-BSD FRML MDRD: 84 ML/MIN/1.73M2
GLUCOSE BLD-MCNC: 113 MG/DL (ref 70–99)
HCT VFR BLD AUTO: 38.9 % (ref 40–53)
HGB BLD-MCNC: 14 G/DL (ref 13.3–17.7)
MCH RBC QN AUTO: 28 PG (ref 26.5–33)
MCHC RBC AUTO-ENTMCNC: 36 G/DL (ref 31.5–36.5)
MCV RBC AUTO: 78 FL (ref 78–100)
PLATELET # BLD AUTO: 212 10E3/UL (ref 150–450)
POTASSIUM BLD-SCNC: 3.3 MMOL/L (ref 3.4–5.3)
RBC # BLD AUTO: 5 10E6/UL (ref 4.4–5.9)
SODIUM SERPL-SCNC: 141 MMOL/L (ref 133–144)
TOTAL PROTEIN SERUM FOR ELP: 6.5 G/DL (ref 6.4–8.3)
WBC # BLD AUTO: 4.4 10E3/UL (ref 4–11)

## 2022-08-08 PROCEDURE — 82374 ASSAY BLOOD CARBON DIOXIDE: CPT | Performed by: INTERNAL MEDICINE

## 2022-08-08 PROCEDURE — 36415 COLL VENOUS BLD VENIPUNCTURE: CPT

## 2022-08-08 PROCEDURE — 85014 HEMATOCRIT: CPT | Performed by: INTERNAL MEDICINE

## 2022-08-08 PROCEDURE — 99214 OFFICE O/P EST MOD 30 MIN: CPT | Performed by: INTERNAL MEDICINE

## 2022-08-08 PROCEDURE — 82947 ASSAY GLUCOSE BLOOD QUANT: CPT | Performed by: INTERNAL MEDICINE

## 2022-08-08 PROCEDURE — 83521 IG LIGHT CHAINS FREE EACH: CPT | Performed by: INTERNAL MEDICINE

## 2022-08-08 PROCEDURE — 82784 ASSAY IGA/IGD/IGG/IGM EACH: CPT | Performed by: INTERNAL MEDICINE

## 2022-08-08 PROCEDURE — 84165 PROTEIN E-PHORESIS SERUM: CPT | Mod: 26 | Performed by: STUDENT IN AN ORGANIZED HEALTH CARE EDUCATION/TRAINING PROGRAM

## 2022-08-08 PROCEDURE — 84165 PROTEIN E-PHORESIS SERUM: CPT | Mod: TC | Performed by: STUDENT IN AN ORGANIZED HEALTH CARE EDUCATION/TRAINING PROGRAM

## 2022-08-08 PROCEDURE — 84155 ASSAY OF PROTEIN SERUM: CPT | Performed by: INTERNAL MEDICINE

## 2022-08-08 PROCEDURE — G0463 HOSPITAL OUTPT CLINIC VISIT: HCPCS

## 2022-08-08 RX ORDER — FLUTICASONE PROPIONATE 50 MCG
SPRAY, SUSPENSION (ML) NASAL
COMMUNITY
Start: 2021-11-01

## 2022-08-08 RX ORDER — OMEPRAZOLE 40 MG/1
CAPSULE, DELAYED RELEASE ORAL
COMMUNITY
Start: 2022-08-07 | End: 2023-11-09

## 2022-08-08 ASSESSMENT — PAIN SCALES - GENERAL: PAINLEVEL: NO PAIN (0)

## 2022-08-08 NOTE — PROGRESS NOTES
"Oncology Rooming Note    August 8, 2022 10:00 AM   Anderson Mitchell is a 62 year old male who presents for:    Chief Complaint   Patient presents with     Oncology Clinic Visit     Initial Vitals: There were no vitals taken for this visit. Estimated body mass index is 27.92 kg/m  as calculated from the following:    Height as of 10/14/21: 1.854 m (6' 1\").    Weight as of 10/14/21: 96 kg (211 lb 9.6 oz). There is no height or weight on file to calculate BSA.  Data Unavailable Comment: Data Unavailable   No LMP for male patient.  Allergies reviewed: Yes  Medications reviewed: Yes    Medications: Medication refills not needed today.  Pharmacy name entered into [id number] [place] [address] [facility]    Clinical concerns:  doctor was notified.      Desiree Palmer MA            "

## 2022-08-08 NOTE — LETTER
"    8/8/2022         RE: Anderson Mitchell  [address]  [facility] [address]        Dear Colleague,    Thank you for referring your patient, Anderson Mitchell, to the [facility] CANCER [facility]. Please see a copy of my visit note below.    Oncology Rooming Note    August 8, 2022 10:00 AM   Anderson Mitchell is a 62 year old male who presents for:    Chief Complaint   Patient presents with     Oncology Clinic Visit     Initial Vitals: There were no vitals taken for this visit. Estimated body mass index is 27.92 kg/m  as calculated from the following:    Height as of 10/14/21: 1.854 m (6' 1\").    Weight as of 10/14/21: 96 kg (211 lb 9.6 oz). There is no height or weight on file to calculate BSA.  Data Unavailable Comment: Data Unavailable   No LMP for male patient.  Allergies reviewed: Yes  Medications reviewed: Yes    Medications: Medication refills not needed today.  Pharmacy name entered into [employer]: [facility] [id number] [place] [address] AT [facility]    Clinical concerns:  doctor was notified.      Desiree Palmer MA              HEMATOLOGY HISTORY: Mr. Mitchell is a gentleman with smoldering myeloma.  -In 2008, EGD revealed a submucosal smooth nodule 5 cm near the antrum of the stomach. Had wedge gastrectomy on 05/05/2008.  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       1.  On 02/03/2021:  -Normal calcium and creatinine.  -Normal hemoglobin A1c.  -Normal TSH.     2.  Multiple labs done on 03/15/2021:    -Normal LFT.  -Normal copper.  -Normal zinc.  -Normal vitamin D.  -SPEP revealed monoclonal peak of 0.8.  -Serum immunofixation reveals monoclonal IgG lambda.  -IgG level of 1562.     3.  On 04/01/2021:  -[place] free light chain of 1.27 and lambda free light chain of 2.88. Normal ratio.  -Urine immunofixation reveals faint monoclonal IgG lambda.     4.  Multiple CBCs have been done in the past.  WBC, hemoglobin and platelet have been " normal.  MCV has been mildly low around 77.  The patient carries a diagnosis of thalassemia trait.     5. Bone marrow biopsy on 07/01/2021 reveals 10% to 15% lambda monotypic plasma cell.  Bone marrow was 40% cellularity with trilineage hematopoiesis.  No amyloid deposits.  Flow cytometry reveals lambda monotypic plasma cells.  -FISH : Gain of 1q (1%), Gain of 11q (68%), Loss of 13q14 (35%). Monosomy 13 (15%) and rearrangement of IGH with (34%) or without (28%) gain of 5' (telomeric) signal   -46,XY[19]     6. Skeletal survey on 07/12/2021 revealed degenerative changes.  No lytic lesion or compression fracture.     SUBJECTIVE:  Mr. Mitchell is a 62-year-old gentleman with smoldering myeloma.      He is doing good.  No headache.  No dizziness.  No chest pain.  No shortness of breath.  No abdominal pain, nausea or vomiting.  No urinary or bowel complaints. No bone pain. He has mild peripheral neuropathy.  It does not bother him all the time.  He gets some numbness in the fingers and the bottom of the feet. All other review of systems negative.     PHYSICAL EXAMINATION:   GENERAL:  Alert and oriented x 3.   VITAL SIGNS:  Reviewed.  ECOG PS of 0.     EYES:  No icterus.   NECK:  Supple. No lymphadenopathy. No thyromegaly.   AXILLAE:  No lymphadenopathy.   LUNGS:  Good air entry bilaterally.  No crackles or wheezing.   HEART:  Regular.  No murmur.   GI: Abdomen is soft.  Nontender. No mass.   EXTREMITIES:  No pedal edema.  No calf swelling or tenderness.   SKIN:  No rash.      LABS:  CBC, BMP, free light chain, IgG level and SPEPreviewed.     ASSESSMENT:    1.  A 62-year-old gentleman with smoldering myeloma, which is stable.  2.  Peripheral neuropathy, stable.    3.  Mild hypokalemia.     PLAN:  1.  Patient is doing well.  Labs were reviewed with him.  Explained to him his smoldering myeloma is stable.  We will continue to monitor it every 6 months.    2.  Potassium is minimally low.  Advised him to eat some high  potassium food.    3.  He had a few questions, which were all answered. I will see him in 6 months with labs.    Total visit time of 30 minutes.  Time spent in today's visit, review of chart/investigations today and documentation today.      Again, thank you for allowing me to participate in the care of your patient.        Sincerely,        Jackie Braden MD

## 2022-08-08 NOTE — PROGRESS NOTES
Medical Assistant Note:  Anderson Mitchell presents today for lab draw.    Patient seen by provider today: Yes: Dr Braden.   present during visit today: Not Applicable.    Concerns: No Concerns.    Procedure:  Lab draw site: LAC, Needle type: BF, Gauge: 21. Gauze and coban applied    Post Assessment:  Labs drawn without difficulty: Yes.    Discharge Plan:  Departure Mode: Ambulatory.    Face to Face Time: 5.    Ines Pinzon CMA

## 2022-08-08 NOTE — PROGRESS NOTES
HEMATOLOGY HISTORY: Mr. Mitchell is a gentleman with smoldering myeloma.  -In 2008, EGD revealed a submucosal smooth nodule 5 cm near the antrum of the stomach. Had wedge gastrectomy on 05/05/2008.  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       1.  On 02/03/2021:  -Normal calcium and creatinine.  -Normal hemoglobin A1c.  -Normal TSH.     2.  Multiple labs done on 03/15/2021:    -Normal LFT.  -Normal copper.  -Normal zinc.  -Normal vitamin D.  -SPEP revealed monoclonal peak of 0.8.  -Serum immunofixation reveals monoclonal IgG lambda.  -IgG level of 1562.     3.  On 04/01/2021:  -[place] free light chain of 1.27 and lambda free light chain of 2.88. Normal ratio.  -Urine immunofixation reveals faint monoclonal IgG lambda.     4.  Multiple CBCs have been done in the past.  WBC, hemoglobin and platelet have been normal.  MCV has been mildly low around 77.  The patient carries a diagnosis of thalassemia trait.     5. Bone marrow biopsy on 07/01/2021 reveals 10% to 15% lambda monotypic plasma cell.  Bone marrow was 40% cellularity with trilineage hematopoiesis.  No amyloid deposits.  Flow cytometry reveals lambda monotypic plasma cells.  -FISH : Gain of 1q (1%), Gain of 11q (68%), Loss of 13q14 (35%). Monosomy 13 (15%) and rearrangement of IGH with (34%) or without (28%) gain of 5' (telomeric) signal   -46,XY[19]     6. Skeletal survey on 07/12/2021 revealed degenerative changes.  No lytic lesion or compression fracture.     SUBJECTIVE:  Mr. Mitchell is a 62-year-old gentleman with smoldering myeloma.      He is doing good.  No headache.  No dizziness.  No chest pain.  No shortness of breath.  No abdominal pain, nausea or vomiting.  No urinary or bowel complaints. No bone pain. He has mild peripheral neuropathy.  It does not bother him all the time.  He gets some numbness in the fingers and the bottom of the feet. All other review of systems negative.     PHYSICAL EXAMINATION:   GENERAL:  Alert and oriented x  3.   VITAL SIGNS:  Reviewed.  ECOG PS of 0.     EYES:  No icterus.   NECK:  Supple. No lymphadenopathy. No thyromegaly.   AXILLAE:  No lymphadenopathy.   LUNGS:  Good air entry bilaterally.  No crackles or wheezing.   HEART:  Regular.  No murmur.   GI: Abdomen is soft.  Nontender. No mass.   EXTREMITIES:  No pedal edema.  No calf swelling or tenderness.   SKIN:  No rash.      LABS:  CBC, BMP, free light chain, IgG level and SPEPreviewed.     ASSESSMENT:    1.  A 62-year-old gentleman with smoldering myeloma, which is stable.  2.  Peripheral neuropathy, stable.    3.  Mild hypokalemia.     PLAN:  1.  Patient is doing well.  Labs were reviewed with him.  Explained to him his smoldering myeloma is stable.  We will continue to monitor it every 6 months.    2.  Potassium is minimally low.  Advised him to eat some high potassium food.    3.  He had a few questions, which were all answered. I will see him in 6 months with labs.    Total visit time of 30 minutes.  Time spent in today's visit, review of chart/investigations today and documentation today.

## 2022-08-09 LAB
ALBUMIN SERPL ELPH-MCNC: 3.9 G/DL (ref 3.7–5.1)
ALPHA1 GLOB SERPL ELPH-MCNC: 0.2 G/DL (ref 0.2–0.4)
ALPHA2 GLOB SERPL ELPH-MCNC: 0.5 G/DL (ref 0.5–0.9)
B-GLOBULIN SERPL ELPH-MCNC: 0.6 G/DL (ref 0.6–1)
GAMMA GLOB SERPL ELPH-MCNC: 1.3 G/DL (ref 0.7–1.6)
M PROTEIN SERPL ELPH-MCNC: 0.8 G/DL
PROT PATTERN SERPL ELPH-IMP: ABNORMAL

## 2022-08-10 LAB
IGG SERPL-MCNC: 1521 MG/DL (ref 610–1616)
KAPPA LC FREE SER-MCNC: 1.57 MG/DL (ref 0.33–1.94)
KAPPA LC FREE/LAMBDA FREE SER NEPH: 0.41 {RATIO} (ref 0.26–1.65)
LAMBDA LC FREE SERPL-MCNC: 3.8 MG/DL (ref 0.57–2.63)

## 2022-08-10 NOTE — RESULT ENCOUNTER NOTE
Dear Mr. Mitchell,    Blood tests are stable.    Please, call me with any questions.    Jackie Braden MD

## 2022-10-07 ENCOUNTER — MEDICAL CORRESPONDENCE (OUTPATIENT)
Dept: HEALTH INFORMATION MANAGEMENT | Facility: CLINIC | Age: 62
End: 2022-10-07

## 2022-10-07 ENCOUNTER — TRANSFERRED RECORDS (OUTPATIENT)
Dept: HEALTH INFORMATION MANAGEMENT | Facility: CLINIC | Age: 62
End: 2022-10-07

## 2022-10-07 DIAGNOSIS — M25.572 PAIN IN JOINT INVOLVING ANKLE AND FOOT, LEFT: Primary | ICD-10-CM

## 2022-10-15 ENCOUNTER — HEALTH MAINTENANCE LETTER (OUTPATIENT)
Age: 62
End: 2022-10-15

## 2022-10-17 ENCOUNTER — MYC MEDICAL ADVICE (OUTPATIENT)
Dept: GENERAL RADIOLOGY | Facility: CLINIC | Age: 62
End: 2022-10-17

## 2022-11-18 ENCOUNTER — ANCILLARY PROCEDURE (OUTPATIENT)
Dept: GENERAL RADIOLOGY | Facility: CLINIC | Age: 62
End: 2022-11-18
Attending: ORTHOPAEDIC SURGERY
Payer: COMMERCIAL

## 2022-11-18 DIAGNOSIS — M25.572 PAIN IN JOINT INVOLVING ANKLE AND FOOT, LEFT: ICD-10-CM

## 2022-11-18 PROCEDURE — 77002 NEEDLE LOCALIZATION BY XRAY: CPT | Mod: GC | Performed by: RADIOLOGY

## 2022-11-18 PROCEDURE — 20605 DRAIN/INJ JOINT/BURSA W/O US: CPT | Mod: LT | Performed by: RADIOLOGY

## 2022-11-18 RX ORDER — IOPAMIDOL 408 MG/ML
10 INJECTION, SOLUTION INTRATHECAL ONCE
Status: COMPLETED | OUTPATIENT
Start: 2022-11-18 | End: 2022-11-18

## 2022-11-18 RX ORDER — LIDOCAINE HYDROCHLORIDE 10 MG/ML
5 INJECTION, SOLUTION EPIDURAL; INFILTRATION; INTRACAUDAL; PERINEURAL ONCE
Status: COMPLETED | OUTPATIENT
Start: 2022-11-18 | End: 2022-11-18

## 2022-11-18 RX ORDER — TRIAMCINOLONE ACETONIDE 40 MG/ML
40 INJECTION, SUSPENSION INTRA-ARTICULAR; INTRAMUSCULAR ONCE
Status: COMPLETED | OUTPATIENT
Start: 2022-11-18 | End: 2022-11-18

## 2022-11-18 RX ORDER — BUPIVACAINE HYDROCHLORIDE 2.5 MG/ML
10 INJECTION, SOLUTION EPIDURAL; INFILTRATION; INTRACAUDAL ONCE
Status: COMPLETED | OUTPATIENT
Start: 2022-11-18 | End: 2022-11-18

## 2022-11-18 RX ADMIN — BUPIVACAINE HYDROCHLORIDE 10 MG: 2.5 INJECTION, SOLUTION EPIDURAL; INFILTRATION; INTRACAUDAL at 10:57

## 2022-11-18 RX ADMIN — TRIAMCINOLONE ACETONIDE 40 MG: 40 INJECTION, SUSPENSION INTRA-ARTICULAR; INTRAMUSCULAR at 10:57

## 2022-11-18 RX ADMIN — IOPAMIDOL 2 ML: 408 INJECTION, SOLUTION INTRATHECAL at 10:57

## 2022-11-18 RX ADMIN — LIDOCAINE HYDROCHLORIDE 5 ML: 10 INJECTION, SOLUTION EPIDURAL; INFILTRATION; INTRACAUDAL; PERINEURAL at 10:57

## 2023-01-06 ENCOUNTER — TRANSCRIBE ORDERS (OUTPATIENT)
Dept: OTHER | Age: 63
End: 2023-01-06

## 2023-01-06 DIAGNOSIS — N52.9 ERECTILE DYSFUNCTION: Primary | ICD-10-CM

## 2023-02-08 ENCOUNTER — LAB (OUTPATIENT)
Dept: INFUSION THERAPY | Facility: CLINIC | Age: 63
End: 2023-02-08
Attending: INTERNAL MEDICINE
Payer: COMMERCIAL

## 2023-02-08 ENCOUNTER — ONCOLOGY VISIT (OUTPATIENT)
Dept: ONCOLOGY | Facility: CLINIC | Age: 63
End: 2023-02-08
Attending: INTERNAL MEDICINE
Payer: COMMERCIAL

## 2023-02-08 VITALS
DIASTOLIC BLOOD PRESSURE: 88 MMHG | HEIGHT: 73 IN | WEIGHT: 214 LBS | BODY MASS INDEX: 28.36 KG/M2 | RESPIRATION RATE: 16 BRPM | HEART RATE: 58 BPM | OXYGEN SATURATION: 98 % | SYSTOLIC BLOOD PRESSURE: 145 MMHG

## 2023-02-08 DIAGNOSIS — D47.2 SMOLDERING MYELOMA: ICD-10-CM

## 2023-02-08 DIAGNOSIS — D47.2 SMOLDERING MYELOMA: Primary | ICD-10-CM

## 2023-02-08 LAB
ANION GAP SERPL CALCULATED.3IONS-SCNC: 9 MMOL/L (ref 7–15)
BUN SERPL-MCNC: 17.7 MG/DL (ref 8–23)
CALCIUM SERPL-MCNC: 9.2 MG/DL (ref 8.8–10.2)
CHLORIDE SERPL-SCNC: 101 MMOL/L (ref 98–107)
CREAT SERPL-MCNC: 1.14 MG/DL (ref 0.67–1.17)
DEPRECATED HCO3 PLAS-SCNC: 29 MMOL/L (ref 22–29)
ERYTHROCYTE [DISTWIDTH] IN BLOOD BY AUTOMATED COUNT: 14.3 % (ref 10–15)
GFR SERPL CREATININE-BSD FRML MDRD: 74 ML/MIN/1.73M2
GLUCOSE SERPL-MCNC: 110 MG/DL (ref 70–99)
HCT VFR BLD AUTO: 41.2 % (ref 40–53)
HGB BLD-MCNC: 15.1 G/DL (ref 13.3–17.7)
MCH RBC QN AUTO: 29 PG (ref 26.5–33)
MCHC RBC AUTO-ENTMCNC: 36.7 G/DL (ref 31.5–36.5)
MCV RBC AUTO: 79 FL (ref 78–100)
PLATELET # BLD AUTO: 207 10E3/UL (ref 150–450)
POTASSIUM SERPL-SCNC: 3.8 MMOL/L (ref 3.4–5.3)
RBC # BLD AUTO: 5.2 10E6/UL (ref 4.4–5.9)
SODIUM SERPL-SCNC: 139 MMOL/L (ref 136–145)
TOTAL PROTEIN SERUM FOR ELP: 6.5 G/DL (ref 6.4–8.3)
WBC # BLD AUTO: 4 10E3/UL (ref 4–11)

## 2023-02-08 PROCEDURE — 84165 PROTEIN E-PHORESIS SERUM: CPT | Mod: TC | Performed by: PATHOLOGY

## 2023-02-08 PROCEDURE — 99214 OFFICE O/P EST MOD 30 MIN: CPT | Performed by: INTERNAL MEDICINE

## 2023-02-08 PROCEDURE — 83521 IG LIGHT CHAINS FREE EACH: CPT | Performed by: INTERNAL MEDICINE

## 2023-02-08 PROCEDURE — 80048 BASIC METABOLIC PNL TOTAL CA: CPT | Performed by: INTERNAL MEDICINE

## 2023-02-08 PROCEDURE — G0463 HOSPITAL OUTPT CLINIC VISIT: HCPCS | Performed by: INTERNAL MEDICINE

## 2023-02-08 PROCEDURE — 84155 ASSAY OF PROTEIN SERUM: CPT | Performed by: INTERNAL MEDICINE

## 2023-02-08 PROCEDURE — 36415 COLL VENOUS BLD VENIPUNCTURE: CPT

## 2023-02-08 PROCEDURE — 85027 COMPLETE CBC AUTOMATED: CPT | Performed by: INTERNAL MEDICINE

## 2023-02-08 PROCEDURE — 82784 ASSAY IGA/IGD/IGG/IGM EACH: CPT | Performed by: INTERNAL MEDICINE

## 2023-02-08 PROCEDURE — 84165 PROTEIN E-PHORESIS SERUM: CPT | Mod: 26

## 2023-02-08 RX ORDER — SILDENAFIL 50 MG/1
TABLET, FILM COATED ORAL
COMMUNITY
Start: 2022-12-22 | End: 2023-11-09

## 2023-02-08 RX ORDER — CHLORTHALIDONE 25 MG/1
1 TABLET ORAL
COMMUNITY
Start: 2022-12-15

## 2023-02-08 ASSESSMENT — PAIN SCALES - GENERAL: PAINLEVEL: NO PAIN (0)

## 2023-02-08 NOTE — PROGRESS NOTES
"Oncology Rooming Note    February 8, 2023 9:32 AM   Anderson Mitchell is a 62 year old male who presents for:    Chief Complaint   Patient presents with     Oncology Clinic Visit     Initial Vitals: There were no vitals taken for this visit. Estimated body mass index is 28.63 kg/m  as calculated from the following:    Height as of 8/8/22: 1.854 m (6' 1\").    Weight as of 8/8/22: 98.4 kg (217 lb). There is no height or weight on file to calculate BSA.  Data Unavailable Comment: Data Unavailable   No LMP for male patient.  Allergies reviewed: Yes  Medications reviewed: Yes    Medications: Medication refills not needed today.  Pharm[facility] entered into [id number] [place] [address] AT [facility]    Clinical concerns:  doctor was notified.      Desiree Palmer MA            "

## 2023-02-08 NOTE — LETTER
"    2/8/2023         RE: Anderson Mitchell  [address]  [facility] [address]        Dear Colleague,    Thank you for referring your patient, Anderson Mitchell, to the [facility] CANCER CENTER [place]. Please see a copy of my visit note below.    Oncology Rooming Note    February 8, 2023 9:32 AM   Anderson Mitchell is a 62 year old male who presents for:    Chief Complaint   Patient presents with     Oncology Clinic Visit     Initial Vitals: There were no vitals taken for this visit. Estimated body mass index is 28.63 kg/m  as calculated from the following:    Height as of 8/8/22: 1.854 m (6' 1\").    Weight as of 8/8/22: 98.4 kg (217 lb). There is no height or weight on file to calculate BSA.  Data Unavailable Comment: Data Unavailable   No LMP for male patient.  Allergies reviewed: Yes  Medications reviewed: Yes    Medications: Medication refills not needed today.  Pharmacy name entered into [employer]: [facility] [id number] [place] [address] [facility]    Clinical concerns:  doctor was notified.      Desiree Palmer MA              HEMATOLOGY HISTORY: Mr. Mitchell is a gentleman with smoldering myeloma.  -In 2008, EGD revealed a submucosal smooth nodule 5 cm near the antrum of the stomach. Had wedge gastrectomy on 05/05/2008.  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       1.  On 02/03/2021:  -Normal calcium and creatinine.  -Normal hemoglobin A1c.  -Normal TSH.     2.  Multiple labs done on 03/15/2021:    -Normal LFT.  -Normal copper.  -Normal zinc.  -Normal vitamin D.  -SPEP revealed monoclonal peak of 0.8.  -Serum immunofixation reveals monoclonal IgG lambda.  -IgG level of 1562.     3.  On 04/01/2021:  -[place] free light chain of 1.27 and lambda free light chain of 2.88. Normal ratio.  -Urine immunofixation reveals faint monoclonal IgG lambda.     4.  Multiple CBCs have been done in the past.  WBC, hemoglobin and platelet have been normal. "  MCV has been mildly low around 77.  The patient carries a diagnosis of thalassemia trait.     5. Bone marrow biopsy on 07/01/2021 reveals 10% to 15% lambda monotypic plasma cell.  Bone marrow was 40% cellularity with trilineage hematopoiesis.  No amyloid deposits.  Flow cytometry reveals lambda monotypic plasma cells.  -FISH : Gain of 1q (1%), Gain of 11q (68%), Loss of 13q14 (35%). Monosomy 13 (15%) and rearrangement of IGH with (34%) or without (28%) gain of 5' (telomeric) signal   -46,XY[19]     6. Skeletal survey on 07/12/2021 revealed degenerative changes.  No lytic lesion or compression fracture.     SUBJECTIVE:  Mr. Mitchell is a 62-year-old gentleman with smoldering myeloma on observation.      He is doing well. He is very active and runs few miles daily. No headache.  No dizziness.  No chest pain.  No shortness of breath.  No abdominal pain, nausea or vomiting.  No urinary or bowel complaints. No bone pain. He has mild peripheral neuropathy. It is intermittent.  He gets some numbness in the fingers and the bottom of the feet. No fever or night sweats. All other review of systems negative.     PHYSICAL EXAMINATION:   GENERAL:  Alert and oriented x 3.   VITAL SIGNS:  Reviewed.  ECOG PS of 0.     EYES:  No icterus.   NECK:  Supple. No lymphadenopathy. No thyromegaly.   AXILLAE:  No lymphadenopathy.   LUNGS:  Good air entry bilaterally.  No crackles or wheezing.   HEART:  Regular.  No murmur.   GI: Abdomen is soft.  Nontender. No mass.   EXTREMITIES:  No pedal edema.  No calf swelling or tenderness.   SKIN:  No rash.      LABS:  CBC, BMP, free light chain, IgG level and SPEPreviewed.     ASSESSMENT:    1.  A 62-year-old gentleman with smoldering myeloma. Stable.  2.  Peripheral neuropathy, stable.       PLAN:  1.  Patient's overall condition is stable.  No new symptoms.    Discussed regarding smoldering myeloma.  Labs are all reviewed.  Smoldering myeloma is stable.    We will continue to monitor him.   Patient wants to be monitored less frequently.  That is reasonable given the fact that smoldering myeloma has been stable for 2 years.  I will see him in 9 months time with labs.  Advised him to call us if he has worsening weakness, bone pain, recurrent infection, weight loss or any other concerns.    2.  He had a few questions, which were all answered. I will see him in 9 months with labs.     Total visit time of 30 minutes.  Time spent in today's visit, review of chart/investigations today and documentation today.      Again, thank you for allowing me to participate in the care of your patient.        Sincerely,        Jackie Braden MD

## 2023-02-08 NOTE — PROGRESS NOTES
HEMATOLOGY HISTORY: Mr. Mitchell is a gentleman with smoldering myeloma.  -In 2008, EGD revealed a submucosal smooth nodule 5 cm near the antrum of the stomach. Had wedge gastrectomy on 05/05/2008.  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       1.  On 02/03/2021:  -Normal calcium and creatinine.  -Normal hemoglobin A1c.  -Normal TSH.     2.  Multiple labs done on 03/15/2021:    -Normal LFT.  -Normal copper.  -Normal zinc.  -Normal vitamin D.  -SPEP revealed monoclonal peak of 0.8.  -Serum immunofixation reveals monoclonal IgG lambda.  -IgG level of 1562.     3.  On 04/01/2021:  -McLeod free light chain of 1.27 and lambda free light chain of 2.88. Normal ratio.  -Urine immunofixation reveals faint monoclonal IgG lambda.     4.  Multiple CBCs have been done in the past.  WBC, hemoglobin and platelet have been normal.  MCV has been mildly low around 77.  The patient carries a diagnosis of thalassemia trait.     5. Bone marrow biopsy on 07/01/2021 reveals 10% to 15% lambda monotypic plasma cell.  Bone marrow was 40% cellularity with trilineage hematopoiesis.  No amyloid deposits.  Flow cytometry reveals lambda monotypic plasma cells.  -FISH : Gain of 1q (1%), Gain of 11q (68%), Loss of 13q14 (35%). Monosomy 13 (15%) and rearrangement of IGH with (34%) or without (28%) gain of 5' (telomeric) signal   -46,XY[19]     6. Skeletal survey on 07/12/2021 revealed degenerative changes.  No lytic lesion or compression fracture.     SUBJECTIVE:  Mr. Mitchell is a 62-year-old gentleman with smoldering myeloma on observation.      He is doing well. He is very active and runs few miles daily. No headache.  No dizziness.  No chest pain.  No shortness of breath.  No abdominal pain, nausea or vomiting.  No urinary or bowel complaints. No bone pain. He has mild peripheral neuropathy. It is intermittent.  He gets some numbness in the fingers and the bottom of the feet. No fever or night sweats. All other review of systems  negative.     PHYSICAL EXAMINATION:   GENERAL:  Alert and oriented x 3.   VITAL SIGNS:  Reviewed.  ECOG PS of 0.     EYES:  No icterus.   NECK:  Supple. No lymphadenopathy. No thyromegaly.   AXILLAE:  No lymphadenopathy.   LUNGS:  Good air entry bilaterally.  No crackles or wheezing.   HEART:  Regular.  No murmur.   GI: Abdomen is soft.  Nontender. No mass.   EXTREMITIES:  No pedal edema.  No calf swelling or tenderness.   SKIN:  No rash.      LABS:  CBC, BMP, free light chain, IgG level and SPEPreviewed.     ASSESSMENT:    1.  A 62-year-old gentleman with smoldering myeloma. Stable.  2.  Peripheral neuropathy, stable.       PLAN:  1.  Patient's overall condition is stable.  No new symptoms.    Discussed regarding smoldering myeloma.  Labs are all reviewed.  Smoldering myeloma is stable.    We will continue to monitor him.  Patient wants to be monitored less frequently.  That is reasonable given the fact that smoldering myeloma has been stable for 2 years.  I will see him in 9 months time with labs.  Advised him to call us if he has worsening weakness, bone pain, recurrent infection, weight loss or any other concerns.    2.  He had a few questions, which were all answered. I will see him in 9 months with labs.     Total visit time of 30 minutes.  Time spent in today's visit, review of chart/investigations today and documentation today.

## 2023-02-08 NOTE — PROGRESS NOTES
Medical Assistant Note:  Anderson Mitchell presents today for blood draw.    Patient seen by provider today: Yes: erin.   present during visit today: Not Applicable.    Concerns: No Concerns.    Procedure:  Lab draw site: lac, Needle type: bf, Gauge: 23.    Post Assessment:  Labs drawn without difficulty: Yes.    Discharge Plan:  Departure Mode: Ambulatory.    Face to Face Time: 5 min.    Andreina Harvey, CMA

## 2023-02-09 LAB
ALBUMIN SERPL ELPH-MCNC: 4 G/DL (ref 3.7–5.1)
ALPHA1 GLOB SERPL ELPH-MCNC: 0.2 G/DL (ref 0.2–0.4)
ALPHA2 GLOB SERPL ELPH-MCNC: 0.5 G/DL (ref 0.5–0.9)
B-GLOBULIN SERPL ELPH-MCNC: 0.6 G/DL (ref 0.6–1)
GAMMA GLOB SERPL ELPH-MCNC: 1.3 G/DL (ref 0.7–1.6)
IGG SERPL-MCNC: 1553 MG/DL (ref 610–1616)
KAPPA LC FREE SER-MCNC: 1.79 MG/DL (ref 0.33–1.94)
KAPPA LC FREE/LAMBDA FREE SER NEPH: 0.4 {RATIO} (ref 0.26–1.65)
LAMBDA LC FREE SERPL-MCNC: 4.52 MG/DL (ref 0.57–2.63)
M PROTEIN SERPL ELPH-MCNC: 0.8 G/DL
PROT PATTERN SERPL ELPH-IMP: ABNORMAL

## 2023-02-10 NOTE — RESULT ENCOUNTER NOTE
Dear Mr. Mitchell,    Blood tests are overall stable.    Please, call me with any questions.    Jackie Braden MD

## 2023-02-28 DIAGNOSIS — G47.33 OSA (OBSTRUCTIVE SLEEP APNEA): Primary | ICD-10-CM

## 2023-03-14 ASSESSMENT — ENCOUNTER SYMPTOMS
POLYPHAGIA: 0
FATIGUE: 1
WEIGHT GAIN: 0
INCREASED ENERGY: 1
FEVER: 0
CHILLS: 0
POLYDIPSIA: 0
DECREASED APPETITE: 0
HALLUCINATIONS: 0
NIGHT SWEATS: 0
ALTERED TEMPERATURE REGULATION: 0
WEIGHT LOSS: 0

## 2023-03-14 NOTE — TELEPHONE ENCOUNTER
DIAGNOSIS: Left Ankle   APPOINTMENT DATE: 03/21/2023   NOTES STATUS DETAILS   OFFICE NOTE from referring provider SELF 03/12/2023 - MyC Message   OFFICE NOTE from other specialist Internal 03/07/2017 - Ayush Hamilton MD - [facility]   MEDICATION LIST Internal    INJECTIONS DONE IN RADIOLOGY Internal    XRAYS (IMAGES & REPORTS) Internal

## 2023-03-16 DIAGNOSIS — M25.572 PAIN IN JOINT INVOLVING ANKLE AND FOOT, LEFT: Primary | ICD-10-CM

## 2023-03-17 ENCOUNTER — TELEPHONE (OUTPATIENT)
Dept: UROLOGY | Facility: CLINIC | Age: 63
End: 2023-03-17

## 2023-03-17 NOTE — TELEPHONE ENCOUNTER
[facility] Call Center    Phone Message    May a detailed message be left on voicemail: yes     Reason for Call: Pt called and stated that he went to his appt on 3/17/23 and he did not know it was cancelled. Pt said someone at the  said they would give him a call to reschedule. Writer offered to reschedule appt and pt declined. Pt would like the clinic to give him a call to reschedule. Thank you.    Action Taken: Other: URO    Travel Screening: Not Applicable

## 2023-03-21 ENCOUNTER — OFFICE VISIT (OUTPATIENT)
Dept: ORTHOPEDICS | Facility: CLINIC | Age: 63
End: 2023-03-21
Payer: COMMERCIAL

## 2023-03-21 ENCOUNTER — ANCILLARY PROCEDURE (OUTPATIENT)
Dept: GENERAL RADIOLOGY | Facility: CLINIC | Age: 63
End: 2023-03-21
Attending: ORTHOPAEDIC SURGERY
Payer: COMMERCIAL

## 2023-03-21 ENCOUNTER — PRE VISIT (OUTPATIENT)
Dept: ORTHOPEDICS | Facility: CLINIC | Age: 63
End: 2023-03-21

## 2023-03-21 DIAGNOSIS — M25.572 PAIN IN JOINT INVOLVING ANKLE AND FOOT, LEFT: ICD-10-CM

## 2023-03-21 DIAGNOSIS — M25.572 PAIN IN JOINT, ANKLE AND FOOT, LEFT: Primary | ICD-10-CM

## 2023-03-21 PROCEDURE — 99204 OFFICE O/P NEW MOD 45 MIN: CPT | Performed by: ORTHOPAEDIC SURGERY

## 2023-03-21 PROCEDURE — 73610 X-RAY EXAM OF ANKLE: CPT | Mod: LT | Performed by: STUDENT IN AN ORGANIZED HEALTH CARE EDUCATION/TRAINING PROGRAM

## 2023-03-21 NOTE — NURSING NOTE
Reason For Visit:   Chief Complaint   Patient presents with     Consult     Left ankle impingement, left subtalar joint osteoarthritis. Last seen 2017. Wants to keep doing injections. They help for 5-8 months.       There were no vitals taken for this visit.         Nova Chiu, ATC

## 2023-03-21 NOTE — PROGRESS NOTES
CHIEF COMPLAINT:    1.  Left subtalar joint arthritis.  2.  Left ankle impingement.    HISTORY OF PRESENT ILLNESS:  Mr. Mitchell presents today for further followup.  Reports to have been doing quite well with injections, which he is getting a few months.  Last injection was 11/18/2022 and reports now to have some symptoms.  He will rate them as a 1 to 2 on a scale from 1-10 with an occasional spike to a 6/10.  Overall, he is very pleased with the current position that he has within the [employer] [facility] and reports to be able to manage his symptoms quite a bit.  He is also fully aware of having gained some weight, but now he is working on bringing it down again.    We reviewed today his past medical and surgical history, current medications and drug allergies.    PHYSICAL EXAMINATION:  On today's visit, he presents as a very pleasant male in no apparent distress.  Denies to have any constitutional symptoms.    On today's visit, he presents with full range of motion of the left ankle and hindfoot joints.  CMS intact.  Skin is intact.  There is no effusion.    IMAGING:  Three views of the left ankle were reviewed today, which were significant for showing a fair amount of arthritis across the ankle joint with complete collapse of the subtalar joint. There are no acute findings.    ASSESSMENT:  Left ankle and subtalar joint arthritis.    PLAN:  Discussed with the patient to proceed with an injection of the ankle and the subtalar joint to see if we can improve his symptoms or to make them last longer than he has done so far.  The injection will be performed under fluoroscopic control with lidocaine and Kenalog for a diagnosis of osteoarthritis.  The patient is cleared to repeat the injections every 3 months or later.    All questions were answered.  Patient was pleased with the discussion.  He will follow up with us in 4 or 5 months from now with a new x-ray of the ankle.    TT:  30 minutes.

## 2023-03-21 NOTE — LETTER
3/21/2023         RE: Anderson Mitchell  [address]  [place] [address]        Dear Colleague,    Thank you for referring your patient, Anderson Mitchell, to the [facility] [place]. Please see a copy of my visit note below.    CHIEF COMPLAINT:    1.  Left subtalar joint arthritis.  2.  Left ankle impingement.    HISTORY OF PRESENT ILLNESS:  Mr. Mitchell presents today for further followup.  Reports to have been doing quite well with injections, which he is getting a few months.  Last injection was 11/18/2022 and reports now to have some symptoms.  He will rate them as a 1 to 2 on a scale from 1-10 with an occasional spike to a 6/10.  Overall, he is very pleased with the current position that he has within the [employer] and reports to be able to manage his symptoms quite a bit.  He is also fully aware of having gained some weight, but now he is working on bringing it down again.    We reviewed today his past medical and surgical history, current medications and drug allergies.    PHYSICAL EXAMINATION:  On today's visit, he presents as a very pleasant male in no apparent distress.  Denies to have any constitutional symptoms.    On today's visit, he presents with full range of motion of the left ankle and hindfoot joints.  CMS intact.  Skin is intact.  There is no effusion.    IMAGING:  Three views of the left ankle were reviewed today, which were significant for showing a fair amount of arthritis across the ankle joint with complete collapse of the subtalar joint. There are no acute findings.    ASSESSMENT:  Left ankle and subtalar joint arthritis.    PLAN:  Discussed with the patient to proceed with an injection of the ankle and the subtalar joint to see if we can improve his symptoms or to make them last longer than he has done so far.  The injection will be performed under fluoroscopic control with lidocaine and Kenalog for a diagnosis of  osteoarthritis.  The patient is cleared to repeat the injections every 3 months or later.    All questions were answered.  Patient was pleased with the discussion.  He will follow up with us in 4 or 5 months from now with a new x-ray of the ankle.    TT:  30 minutes.      Ayush Hamilton MD

## 2023-03-26 ENCOUNTER — HEALTH MAINTENANCE LETTER (OUTPATIENT)
Age: 63
End: 2023-03-26

## 2023-03-27 ENCOUNTER — MYC MEDICAL ADVICE (OUTPATIENT)
Dept: UROLOGY | Facility: CLINIC | Age: 63
End: 2023-03-27

## 2023-03-27 ENCOUNTER — VIRTUAL VISIT (OUTPATIENT)
Dept: UROLOGY | Facility: CLINIC | Age: 63
End: 2023-03-27
Payer: COMMERCIAL

## 2023-03-27 VITALS — HEIGHT: 73 IN | WEIGHT: 209 LBS | BODY MASS INDEX: 27.7 KG/M2

## 2023-03-27 DIAGNOSIS — N28.9 KIDNEY LESION, NATIVE, LEFT: ICD-10-CM

## 2023-03-27 DIAGNOSIS — N13.8 BPH WITH OBSTRUCTION/LOWER URINARY TRACT SYMPTOMS: Primary | ICD-10-CM

## 2023-03-27 DIAGNOSIS — N40.1 BPH WITH OBSTRUCTION/LOWER URINARY TRACT SYMPTOMS: Primary | ICD-10-CM

## 2023-03-27 DIAGNOSIS — N52.01 ERECTILE DYSFUNCTION DUE TO ARTERIAL INSUFFICIENCY: ICD-10-CM

## 2023-03-27 DIAGNOSIS — F40.240 CLAUSTROPHOBIA: ICD-10-CM

## 2023-03-27 PROCEDURE — 99215 OFFICE O/P EST HI 40 MIN: CPT | Mod: VID | Performed by: PHYSICIAN ASSISTANT

## 2023-03-27 RX ORDER — TADALAFIL 5 MG/1
5 TABLET ORAL EVERY 24 HOURS
Qty: 90 TABLET | Refills: 4 | Status: SHIPPED | OUTPATIENT
Start: 2023-03-27 | End: 2024-06-05

## 2023-03-27 RX ORDER — LORAZEPAM 0.5 MG/1
0.5 TABLET ORAL EVERY 6 HOURS PRN
Qty: 1 TABLET | Refills: 0 | Status: SHIPPED | OUTPATIENT
Start: 2023-03-27 | End: 2023-11-09

## 2023-03-27 ASSESSMENT — PAIN SCALES - GENERAL: PAINLEVEL: MILD PAIN (3)

## 2023-03-27 NOTE — LETTER
"3/27/2023       RE: Anderson Mitchell  [address]  [place] [address]     Dear Colleague,    Thank you for referring your patient, Anderson Mitchell, to the [facility] [place] at [facility]. Please see a copy of my visit note below.    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      How would you like to obtain your AVS? MyChart  If the video visit is dropped, the invitation should be resent by: Text to cell phone: [phone]  Will anyone else be joining your video visit? No        Video-Visit Details    Type of service:  Video Visit     Originating Location (pt. Location): Other work    Distant Location (provider location):  On-site  Platform used for Video Visit: [employer]  Start time: 12:02 pm  End time: 12:29pm  CC: ED, LUTS    HPI:  Anderson \"Charan العلي\" Paula is a pleasant 62 year old male who presents in consultation from Dr. Resendiz for evaluation of the above.     Has urgency (georgina in AM), nocturia x 1, stream is strong, not interrupted, minor post void dribbling, no dysuria, sense of very mild pressure at end of penis. No hematuria.     PSA done through [employer] and not avail for review but per pt report was normal.      Has been on Flomax for a year. Has tried sildenafil 50mg (gets a bad headache). Not a full erection with this dose.     Saw Dr. Vivas in 2020 due to complex renal lesion (7 mm left renal inferior pole nonsimple cyst) and was to have a renal MRI in 6 mo follow-up. Pt does not recall this recommendation.     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/2008     Hernia, abdominal      Hypertension      " Malignant neoplasm (H)     gastrointestinal mass-removed 5-2008     Mumps      Thalassaemia trait 05/07/2013       Past Surgical History:   Procedure Laterality Date     ABDOMEN SURGERY       BONE MARROW BIOPSY, BONE SPECIMEN, NEEDLE/TROCAR N/A 07/01/2021    Procedure: BONE MARROW BIOPSY;  Surgeon: Ivette Hassan MD;  Location:  GI     COLONOSCOPY  04/06/2012    Procedure:COLONOSCOPY; Surgeon:TAMIE REYNA; Location: GI     ESOPHAGOSCOPY, GASTROSCOPY, DUODENOSCOPY (EGD), COMBINED N/A 07/30/2020    Procedure: ESOPHAGOGASTRODUODENOSCOPY, WITH BIOPSY;  Surgeon: Tejas Bradley MD;  Location: [facility] OR     HERNIA REPAIR       ORTHOPEDIC SURGERY         Social History     Socioeconomic History     Marital status: Single     Spouse name: Not on file     Number of children: Not on file     Years of education: Not on file     Highest education level: Not on file   Occupational History     Not on file   Tobacco Use     Smoking status: Former     Packs/day: 0.00     Years: 0.00     Pack years: 0.00     Types: Cigars, Cigarettes     Smokeless tobacco: Never     Tobacco comments:     OCC. CIGAR   Substance and Sexual Activity     Alcohol use: Yes     Alcohol/week: 0.0 standard drinks     Comment: 2 a week     Drug use: No     Sexual activity: Never   Other Topics Concern     Parent/sibling w/ CABG, MI or angioplasty before 65F 55M? Not Asked   Social History Narrative     Not on file     Social Determinants of Health     Financial Resource Strain: Not on file   Food Insecurity: Not on file   Transportation Needs: Not on file   Physical Activity: Not on file   Stress: Not on file   Social Connections: Not on file   Intimate Partner Violence: Not on file   Housing Stability: Not on file       Family History   Problem Relation Age of Onset     Hypertension Father         Descease     Respiratory Father         YUNG     Cerebrovascular Disease Father         ,     C.A.D. Father      Respiratory Brother   "       YUNG     Hypertension Brother      Thyroid Disease Mother         descease     Hypertension Brother        Allergies   Allergen Reactions     Cats Itching and Other (See Comments)     Grass      Nkda [No Known Drug Allergies]      No Clinical Screening - See Comments Other (See Comments)     Nuts Itching     Trees        Current Outpatient Medications   Medication     cetirizine (ZYRTEC) 10 MG tablet     chlorthalidone (HYGROTON) 25 MG tablet     fluticasone (FLONASE) 50 MCG/ACT nasal spray     hydrochlorothiazide (HYDRODIURIL) 25 MG tablet     Multiple Vitamin (MULTIVITAMIN) per tablet     omeprazole (PRILOSEC) 40 MG DR capsule     sildenafil (VIAGRA) 50 MG tablet     tamsulosin (FLOMAX) 0.4 MG capsule     VERAPAMIL HCL PO     No current facility-administered medications for this visit.         PEx:   Height 1.854 m (6' 1\"), weight 94.8 kg (209 lb).    PSYCH: NAD  EYES: EOMI  MOUTH: MMM  NEURO: AAO x3    Urine:      A/P: Anderson Mitchell is a 62 year old male with BPH w/ LUTS, ED, renal lesion.   -Renal MRI and follow-up with Dr. Vivas.   -pre-med with lorazepam 0.5mg 30 min prior. We discussed that he must have a .   -Stop Flomax  -Discussed a variety of options including Cialis 5-10mg daily, ICI (penile injections with Tri-Mix), and IPP surgical consult. The risks and benefits of each were reviewed. He chose Cilais 5mg daily. Can increase to 10mg daily at 7 days if room for improvement.   -3 mo med check. Consider cysto for BPH and ICI for ED if not improved.     Divya Valdes PA-C  [facility]        40 minutes spent on the date of the encounter doing chart review, review of outside records, review of test results, interpretation of tests, patient visit and documentation       "

## 2023-03-27 NOTE — PATIENT INSTRUCTIONS
Discontinue Flomax (tamsulosin).    Trial of tadalafil 5mg daily. Can increase to 10mg (two tablets) daily after 7 days if room for improvement.     Stop sildenafil.      MRI of the kidney. Lorazepam 1 tab 30 min prior to MRI for relaxation.

## 2023-03-27 NOTE — PROGRESS NOTES
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      How would you like to obtain your AVS? MyChart  If the video visit is dropped, the invitation should be resent by: Text to cell phone: [phone]  Will anyone else be joining your video visit? No        Video-Visit Details    Type of service:  Video Visit     Originating Location (pt. Location): Other work    Distant Location (provider location):  On-site  Platform used for Video Visit: [email or web address]  Start time: 12:02 pm  End time: 12:29pm  CC: ED, LUTS    HPI:  Anderson \"Charan العلي\" Paula is a pleasant 62 year old male who presents in consultation from Dr. Resendiz for evaluation of the above.     Has urgency (georgina in AM), nocturia x 1, stream is strong, not interrupted, minor post void dribbling, no dysuria, sense of very mild pressure at end of penis. No hematuria.     PSA done through [employer] and not avail for review but per pt report was normal.      Has been on Flomax for a year. Has tried sildenafil 50mg (gets a bad headache). Not a full erection with this dose.     Saw Dr. Vivas in 2020 due to complex renal lesion (7 mm left renal inferior pole nonsimple cyst) and was to have a renal MRI in 6 mo follow-up. Pt does not recall this recommendation.     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/2008     Hernia, abdominal      Hypertension      Malignant neoplasm (H)     gastrointestinal mass-removed 5-2008     Mumps      Thalassaemia trait 05/07/2013       Past Surgical History:   Procedure Laterality Date     ABDOMEN SURGERY       BONE MARROW BIOPSY, BONE SPECIMEN, NEEDLE/TROCAR N/A 07/01/2021    Procedure: BONE MARROW BIOPSY;  Surgeon: Ivette Hassan, " MD;  Location:  GI     COLONOSCOPY  04/06/2012    Procedure:COLONOSCOPY; Surgeon:TAMIE REYNA; Location: GI     ESOPHAGOSCOPY, GASTROSCOPY, DUODENOSCOPY (EGD), COMBINED N/A 07/30/2020    Procedure: ESOPHAGOGASTRODUODENOSCOPY, WITH BIOPSY;  Surgeon: Tejas Bradley MD;  Location: [facility] OR     HERNIA REPAIR       ORTHOPEDIC SURGERY         Social History     Socioeconomic History     Marital status: Single     Spouse name: Not on file     Number of children: Not on file     Years of education: Not on file     Highest education level: Not on file   Occupational History     Not on file   Tobacco Use     Smoking status: Former     Packs/day: 0.00     Years: 0.00     Pack years: 0.00     Types: Cigars, Cigarettes     Smokeless tobacco: Never     Tobacco comments:     OCC. CIGAR   Substance and Sexual Activity     Alcohol use: Yes     Alcohol/week: 0.0 standard drinks     Comment: 2 a week     Drug use: No     Sexual activity: Never   Other Topics Concern     Parent/sibling w/ CABG, MI or angioplasty before 65F 55M? Not Asked   Social History Narrative     Not on file     Social Determinants of Health     Financial Resource Strain: Not on file   Food Insecurity: Not on file   Transportation Needs: Not on file   Physical Activity: Not on file   Stress: Not on file   Social Connections: Not on file   Intimate Partner Violence: Not on file   Housing Stability: Not on file       Family History   Problem Relation Age of Onset     Hypertension Father         Descease     Respiratory Father         YUNG     Cerebrovascular Disease Father         ,     C.A.D. Father      Respiratory Brother         YUNG     Hypertension Brother      Thyroid Disease Mother         descease     Hypertension Brother        Allergies   Allergen Reactions     Cats Itching and Other (See Comments)     Grass      Nkda [No Known Drug Allergies]      No Clinical Screening - See Comments Other (See Comments)     Nuts Itching     Trees   "      Current Outpatient Medications   Medication     cetirizine (ZYRTEC) 10 MG tablet     chlorthalidone (HYGROTON) 25 MG tablet     fluticasone (FLONASE) 50 MCG/ACT nasal spray     hydrochlorothiazide (HYDRODIURIL) 25 MG tablet     Multiple Vitamin (MULTIVITAMIN) per tablet     omeprazole (PRILOSEC) 40 MG DR capsule     sildenafil (VIAGRA) 50 MG tablet     tamsulosin (FLOMAX) 0.4 MG capsule     VERAPAMIL HCL PO     No current facility-administered medications for this visit.         PEx:   Height 1.854 m (6' 1\"), weight 94.8 kg (209 lb).    PSYCH: NAD  EYES: EOMI  MOUTH: MMM  NEURO: AAO x3    Urine:      A/P: Anderson Mitchell is a 62 year old male with BPH w/ LUTS, ED, renal lesion.   -Renal MRI and follow-up with Dr. Vivas.   -pre-med with lorazepam 0.5mg 30 min prior. We discussed that he must have a .   -Stop Flomax  -Discussed a variety of options including Cialis 5-10mg daily, ICI (penile injections with Tri-Mix), and IPP surgical consult. The risks and benefits of each were reviewed. He chose Cilais 5mg daily. Can increase to 10mg daily at 7 days if room for improvement.   -3 mo med check. Consider cysto for BPH and ICI for ED if not improved.     Divya Valdes PA-C  [facility]        40 minutes spent on the date of the encounter doing chart review, review of outside records, review of test results, interpretation of tests, patient visit and documentation                 "

## 2023-03-29 ENCOUNTER — MYC MEDICAL ADVICE (OUTPATIENT)
Dept: UROLOGY | Facility: CLINIC | Age: 63
End: 2023-03-29
Payer: COMMERCIAL

## 2023-03-31 ENCOUNTER — ANCILLARY PROCEDURE (OUTPATIENT)
Dept: GENERAL RADIOLOGY | Facility: CLINIC | Age: 63
End: 2023-03-31
Attending: ORTHOPAEDIC SURGERY
Payer: COMMERCIAL

## 2023-03-31 DIAGNOSIS — M25.572 PAIN IN JOINT, ANKLE AND FOOT, LEFT: ICD-10-CM

## 2023-03-31 PROCEDURE — 77002 NEEDLE LOCALIZATION BY XRAY: CPT | Mod: GC | Performed by: RADIOLOGY

## 2023-03-31 PROCEDURE — 20605 DRAIN/INJ JOINT/BURSA W/O US: CPT | Mod: XS | Performed by: RADIOLOGY

## 2023-03-31 PROCEDURE — 20605 DRAIN/INJ JOINT/BURSA W/O US: CPT | Mod: LT | Performed by: RADIOLOGY

## 2023-03-31 RX ORDER — IOPAMIDOL 408 MG/ML
10 INJECTION, SOLUTION INTRATHECAL ONCE
Status: COMPLETED | OUTPATIENT
Start: 2023-03-31 | End: 2023-03-31

## 2023-03-31 RX ORDER — BUPIVACAINE HYDROCHLORIDE 2.5 MG/ML
10 INJECTION, SOLUTION EPIDURAL; INFILTRATION; INTRACAUDAL ONCE
Status: COMPLETED | OUTPATIENT
Start: 2023-03-31 | End: 2023-03-31

## 2023-03-31 RX ORDER — TRIAMCINOLONE ACETONIDE 40 MG/ML
40 INJECTION, SUSPENSION INTRA-ARTICULAR; INTRAMUSCULAR ONCE
Status: COMPLETED | OUTPATIENT
Start: 2023-03-31 | End: 2023-03-31

## 2023-03-31 RX ORDER — LIDOCAINE HYDROCHLORIDE 10 MG/ML
5 INJECTION, SOLUTION EPIDURAL; INFILTRATION; INTRACAUDAL; PERINEURAL ONCE
Status: COMPLETED | OUTPATIENT
Start: 2023-03-31 | End: 2023-03-31

## 2023-03-31 RX ADMIN — BUPIVACAINE HYDROCHLORIDE 25 MG: 2.5 INJECTION, SOLUTION EPIDURAL; INFILTRATION; INTRACAUDAL at 09:57

## 2023-03-31 RX ADMIN — LIDOCAINE HYDROCHLORIDE 5 ML: 10 INJECTION, SOLUTION EPIDURAL; INFILTRATION; INTRACAUDAL; PERINEURAL at 09:58

## 2023-03-31 RX ADMIN — IOPAMIDOL 4 ML: 408 INJECTION, SOLUTION INTRATHECAL at 09:57

## 2023-03-31 RX ADMIN — TRIAMCINOLONE ACETONIDE 40 MG: 40 INJECTION, SUSPENSION INTRA-ARTICULAR; INTRAMUSCULAR at 09:57

## 2023-04-10 ENCOUNTER — ANCILLARY PROCEDURE (OUTPATIENT)
Dept: MRI IMAGING | Facility: CLINIC | Age: 63
End: 2023-04-10
Attending: PHYSICIAN ASSISTANT
Payer: COMMERCIAL

## 2023-04-10 DIAGNOSIS — N28.9 KIDNEY LESION, NATIVE, LEFT: ICD-10-CM

## 2023-04-10 PROCEDURE — 74183 MRI ABD W/O CNTR FLWD CNTR: CPT

## 2023-04-10 PROCEDURE — 255N000002 HC RX 255 OP 636: Performed by: PHYSICIAN ASSISTANT

## 2023-04-10 PROCEDURE — A9585 GADOBUTROL INJECTION: HCPCS | Performed by: PHYSICIAN ASSISTANT

## 2023-04-10 RX ORDER — GADOBUTROL 604.72 MG/ML
10 INJECTION INTRAVENOUS ONCE
Status: COMPLETED | OUTPATIENT
Start: 2023-04-10 | End: 2023-04-10

## 2023-04-10 RX ADMIN — GADOBUTROL 10 ML: 604.72 INJECTION INTRAVENOUS at 15:45

## 2023-05-11 LAB
ALT SERPL-CCNC: 34 U/L (ref 16–63)
AST SERPL-CCNC: 23 U/L (ref 0–55)
CREATININE (EXTERNAL): 1.1 MG/DL (ref 0.7–1.3)
GFR ESTIMATED (EXTERNAL): >60 ML/MIN/1.73M2
POTASSIUM (EXTERNAL): 3.6 MMOL/L (ref 3.4–5.3)

## 2023-05-12 LAB — HEMOCCULT STL QL IA: POSITIVE

## 2023-06-19 ENCOUNTER — VIRTUAL VISIT (OUTPATIENT)
Dept: UROLOGY | Facility: CLINIC | Age: 63
End: 2023-06-19
Payer: COMMERCIAL

## 2023-06-19 DIAGNOSIS — N52.01 ERECTILE DYSFUNCTION DUE TO ARTERIAL INSUFFICIENCY: ICD-10-CM

## 2023-06-19 DIAGNOSIS — N40.1 BPH WITH OBSTRUCTION/LOWER URINARY TRACT SYMPTOMS: Primary | ICD-10-CM

## 2023-06-19 DIAGNOSIS — N13.8 BPH WITH OBSTRUCTION/LOWER URINARY TRACT SYMPTOMS: Primary | ICD-10-CM

## 2023-06-19 PROCEDURE — 99214 OFFICE O/P EST MOD 30 MIN: CPT | Mod: VID | Performed by: PHYSICIAN ASSISTANT

## 2023-06-19 RX ORDER — TADALAFIL 10 MG/1
10 TABLET ORAL DAILY PRN
Qty: 90 TABLET | Refills: 4 | Status: SHIPPED | OUTPATIENT
Start: 2023-06-19 | End: 2023-11-09

## 2023-06-19 NOTE — PROGRESS NOTES
"  Charan العلي is a 62 year old who is being evaluated via a billable video visit.      How would you like to obtain your AVS? MyChart  If the video visit is dropped, the invitation should be resent by: Text to cell phone: [phone]  Will anyone else be joining your video visit? No        Video-Visit Details    Type of service:  Video Visit     Originating Location (pt. Location): Other work    Distant Location (provider location):  On-site  Platform used for Video Visit: [employer]  Start time: 2:36 pm  End time: 2:44pm  CC: ED, LUTS, left renal lesion    HPI:  Anderson \"Charan العلي\" Paula is a pleasant 63 year old male who presents in follow-up of the above.   Initially seen 3/27/23:  Has urgency (georgina in AM), nocturia x 1, stream is strong, not interrupted, minor post void dribbling, no dysuria, sense of very mild pressure at end of penis. No hematuria.     PSA done through [employer] and not avail for review but per pt report was normal.      Had been on Flomax for a year. Has tried sildenafil 50mg (gets a bad headache). Not a full erection with this dose.     Saw Dr. Vivas in 2020 due to complex renal lesion (7 mm left renal inferior pole nonsimple cyst) and was to have a renal MRI in 6 mo follow-up. Pt does not recall this recommendation.     TODAY 6/19/23  Started on cialis 5mg daily. This has improved his urinary symptoms and some erectile function (although not firm enough).   Renal MRI in April: small nodule with heterogeneous signal along the lower left kidney is again noted but does not appear to show enhancement. Dr. Vivas rec repeat MRI in 1 year.      Narrative & Impression   EXAM: MR RENAL W/O and W CONTRAST  LOCATION: [facility]  DATE/TIME: 4/10/2023 4:29 PM     INDICATION: 7 mm left renal inferior pole nonsimple cyst in 2020.  COMPARISON: CT abdomen and pelvis dated 12/10/2020.  TECHNIQUE: Routine MRI renal protocol including T1 in/out phase, diffusion, multiplane T2, and " dynamic T1 with IV contrast.  CONTRAST: 10 mL Gadavist IV.     FINDINGS:     RIGHT KIDNEY: No hydronephrosis. No worrisome right renal parenchymal lesion. Tiny cyst at the posterior right mid kidney is 0.4 cm (series 12, image 36).     LEFT KIDNEY: No hydronephrosis. Lesion at the medial upper left kidney appears to represent a minimally lobulated cyst that measures 1 cm (series 12, image 49). A small nodule lower left kidney is 0.7 cm (series 12, image 30). At subtraction imaging,   this does not appear to show enhancement (for example series 13, image 30 and series 10, image 30). Before administration of contrast, it has somewhat heterogeneous intermediate mildly increased T1 signal (series 6, image 29). It has moderately low T2   signal (series 18, image 18).     ADDITIONAL FINDINGS: No enlarged lymph node. No visible renal vein thrombus. Included imaging of the liver, gallbladder, spleen, adrenals, and pancreas do not show any significant abnormalities.                                                                      IMPRESSION:  1. Small nodule with heterogeneous signal along the lower left kidney is again noted but does not appear to show enhancement at subtraction imaging. This could be a mildly heterogeneous hemorrhagic or proteinaceous cyst. Recommend follow-up MRI in 6   months to ensure stability.         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/2008     Hernia, abdominal      Hypertension      Malignant neoplasm (H)     gastrointestinal mass-removed 5-2008     Mumps      Thalassaemia trait 05/07/2013       Past Surgical History:   Procedure Laterality Date     ABDOMEN SURGERY       BONE MARROW BIOPSY, BONE SPECIMEN, NEEDLE/TROCAR N/A 07/01/2021    Procedure: BONE MARROW BIOPSY;  Surgeon: Mo  Ivette Choi MD;  Location:  GI     COLONOSCOPY  04/06/2012    Procedure:COLONOSCOPY; Surgeon:TAMIE REYNA; Location: GI     ESOPHAGOSCOPY, GASTROSCOPY, DUODENOSCOPY (EGD), COMBINED N/A 07/30/2020    Procedure: ESOPHAGOGASTRODUODENOSCOPY, WITH BIOPSY;  Surgeon: Tejas Bradley MD;  [facility]     HERNIA REPAIR       ORTHOPEDIC SURGERY         Social History     Socioeconomic History     Marital status: Single     Spouse name: Not on file     Number of children: Not on file     Years of education: Not on file     Highest education level: Not on file   Occupational History     Not on file   Tobacco Use     Smoking status: Former     Packs/day: 0.00     Years: 0.00     Pack years: 0.00     Types: Cigars, Cigarettes     Smokeless tobacco: Never     Tobacco comments:     OCC. CIGAR   Vaping Use     Vaping status: Not on file   Substance and Sexual Activity     Alcohol use: Yes     Alcohol/week: 0.0 standard drinks of alcohol     Comment: 2 a week     Drug use: No     Sexual activity: Never   Other Topics Concern     Parent/sibling w/ CABG, MI or angioplasty before 65F 55M? Not Asked   Social History Narrative     Not on file     Social Determinants of Health     Financial Resource Strain: Not on file   Food Insecurity: Not on file   Transportation Needs: Not on file   Physical Activity: Not on file   Stress: Not on file   Social Connections: Not on file   Intimate Partner Violence: Not on file   Housing Stability: Not on file       Family History   Problem Relation Age of Onset     Hypertension Father         Descease     Respiratory Father         YUNG     Cerebrovascular Disease Father         ,     C.A.D. Father      Respiratory Brother         YUNG     Hypertension Brother      Thyroid Disease Mother         descease     Hypertension Brother        Allergies   Allergen Reactions     Cats Itching and Other (See Comments)     Grass      Nkda [No Known Drug Allergy]      No Clinical  Screening - See Comments Other (See Comments)     Nuts Itching     Trees        Current Outpatient Medications   Medication     cetirizine (ZYRTEC) 10 MG tablet     chlorthalidone (HYGROTON) 25 MG tablet     fluticasone (FLONASE) 50 MCG/ACT nasal spray     hydrochlorothiazide (HYDRODIURIL) 25 MG tablet     LORazepam (ATIVAN) 0.5 MG tablet     Multiple Vitamin (MULTIVITAMIN) per tablet     omeprazole (PRILOSEC) 40 MG DR capsule     sildenafil (VIAGRA) 50 MG tablet     tadalafil (CIALIS) 5 MG tablet     tamsulosin (FLOMAX) 0.4 MG capsule     VERAPAMIL HCL PO     No current facility-administered medications for this visit.         PEx:   PSYCH: NAD  EYES: EOMI  MOUTH: MMM  NEURO: AAO x3      A/P: Anderson Mitchell is a 63 year old male with BPH w/ LUTS, ED, renal lesion.   -Renal MRI and follow-up 1 year.   -pre-med with lorazepam 0.5mg 30 min prior. We discussed that he must have a .   -Increase Cilais to 10mg daily.   -MyChart update in 4 wks. Consider ICI or MUSEfor ED if not improved.     Divya Valdes PA-C  [facility]        30minutes spent on the date of the encounter doing chart review, review of outside records, review of test results, interpretation of tests, patient visit and documentation

## 2023-06-19 NOTE — LETTER
"6/19/2023       RE: Anderson Mitchell  [address]  [facility] [place] [facility] [address]     Dear Colleague,    Thank you for referring your patient, Anderson Mitchell, to the [facility] [place] at [facility]. Please see a copy of my visit note below.      Charan العلي is a 62 year old who is being evaluated via a billable video visit.      How would you like to obtain your AVS? MyChart  If the video visit is dropped, the invitation should be resent by: Text to cell phone: [phone]  Will anyone else be joining your video visit? No        Video-Visit Details    Type of service:  Video Visit     Originating Location (pt. Location): Other work    Distant Location (provider location):  On-site  Platform used for Video Visit: [employer]  Start time: 2:36 pm  End time: 2:44pm  CC: ED, LUTS, left renal lesion    HPI:  Anderson \"Charan العلي\"Paula is a pleasant 63 year old male who presents in follow-up of the above.   Initially seen 3/27/23:  Has urgency (georgina in AM), nocturia x 1, stream is strong, not interrupted, minor post void dribbling, no dysuria, sense of very mild pressure at end of penis. No hematuria.     PSA done through [email or web address] and not avail for review but per pt report was normal.      Had been on Flomax for a year. Has tried sildenafil 50mg (gets a bad headache). Not a full erection with this dose.     Saw Dr. Vivas in 2020 due to complex renal lesion (7 mm left renal inferior pole nonsimple cyst) and was to have a renal MRI in 6 mo follow-up. Pt does not recall this recommendation.     TODAY 6/19/23  Started on cialis 5mg daily. This has improved his urinary symptoms and some erectile function (although not firm enough).   Renal MRI in April: small nodule with heterogeneous signal along the lower left kidney is again noted but does not appear to show enhancement. Dr. Vivas rec repeat MRI in 1 year.      Narrative & Impression   EXAM: MR " RENAL W/O and W CONTRAST  LOCATION: [facility]  DATE/TIME: 4/10/2023 4:29 PM     INDICATION: 7 mm left renal inferior pole nonsimple cyst in 2020.  COMPARISON: CT abdomen and pelvis dated 12/10/2020.  TECHNIQUE: Routine MRI renal protocol including T1 in/out phase, diffusion, multiplane T2, and dynamic T1 with IV contrast.  CONTRAST: 10 mL Gadavist IV.     FINDINGS:     RIGHT KIDNEY: No hydronephrosis. No worrisome right renal parenchymal lesion. Tiny cyst at the posterior right mid kidney is 0.4 cm (series 12, image 36).     LEFT KIDNEY: No hydronephrosis. Lesion at the medial upper left kidney appears to represent a minimally lobulated cyst that measures 1 cm (series 12, image 49). A small nodule lower left kidney is 0.7 cm (series 12, image 30). At subtraction imaging,   this does not appear to show enhancement (for example series 13, image 30 and series 10, image 30). Before administration of contrast, it has somewhat heterogeneous intermediate mildly increased T1 signal (series 6, image 29). It has moderately low T2   signal (series 18, image 18).     ADDITIONAL FINDINGS: No enlarged lymph node. No visible renal vein thrombus. Included imaging of the liver, gallbladder, spleen, adrenals, and pancreas do not show any significant abnormalities.                                                                      IMPRESSION:  1. Small nodule with heterogeneous signal along the lower left kidney is again noted but does not appear to show enhancement at subtraction imaging. This could be a mildly heterogeneous hemorrhagic or proteinaceous cyst. Recommend follow-up MRI in 6   months to ensure stability.         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    Hernia, abdominal     Hypertension      Malignant neoplasm (H)     gastrointestinal mass-removed 5-2008    Mumps     Thalassaemia trait 05/07/2013       Past Surgical History:   Procedure Laterality Date    ABDOMEN SURGERY      BONE MARROW BIOPSY, BONE SPECIMEN, NEEDLE/TROCAR N/A 07/01/2021    Procedure: BONE MARROW BIOPSY;  Surgeon: Ivette Hassan MD;  Location:  GI    COLONOSCOPY  04/06/2012    Procedure:COLONOSCOPY; Surgeon:TAMIE REYNA; Location: GI    ESOPHAGOSCOPY, GASTROSCOPY, DUODENOSCOPY (EGD), COMBINED N/A 07/30/2020    Procedure: ESOPHAGOGASTRODUODENOSCOPY, WITH BIOPSY;  Surgeon: Tejas Bradley MD;  [facility]    HERNIA REPAIR      ORTHOPEDIC SURGERY         Social History     Socioeconomic History    Marital status: Single     Spouse name: Not on file    Number of children: Not on file    Years of education: Not on file    Highest education level: Not on file   Occupational History    Not on file   Tobacco Use    Smoking status: Former     Packs/day: 0.00     Years: 0.00     Pack years: 0.00     Types: Cigars, Cigarettes    Smokeless tobacco: Never    Tobacco comments:     OCC. CIGAR   Vaping Use    Vaping status: Not on file   Substance and Sexual Activity    Alcohol use: Yes     Alcohol/week: 0.0 standard drinks of alcohol     Comment: 2 a week    Drug use: No    Sexual activity: Never   Other Topics Concern    Parent/sibling w/ CABG, MI or angioplasty before 65F 55M? Not Asked   Social History Narrative    Not on file     Social Determinants of Health     Financial Resource Strain: Not on file   Food Insecurity: Not on file   Transportation Needs: Not on file   Physical Activity: Not on file   Stress: Not on file   Social Connections: Not on file   Intimate Partner Violence: Not on file   Housing Stability: Not on file       Family History   Problem Relation Age of Onset    Hypertension Father         Descease    Respiratory Father         YUNG    Cerebrovascular Disease Father         ,    C.A.D.  Father     Respiratory Brother         YUNG    Hypertension Brother     Thyroid Disease Mother         descease    Hypertension Brother        Allergies   Allergen Reactions    Cats Itching and Other (See Comments)    Grass     Nkda [No Known Drug Allergy]     No Clinical Screening - See Comments Other (See Comments)    Nuts Itching    Trees        Current Outpatient Medications   Medication    cetirizine (ZYRTEC) 10 MG tablet    chlorthalidone (HYGROTON) 25 MG tablet    fluticasone (FLONASE) 50 MCG/ACT nasal spray    hydrochlorothiazide (HYDRODIURIL) 25 MG tablet    LORazepam (ATIVAN) 0.5 MG tablet    Multiple Vitamin (MULTIVITAMIN) per tablet    omeprazole (PRILOSEC) 40 MG DR capsule    sildenafil (VIAGRA) 50 MG tablet    tadalafil (CIALIS) 5 MG tablet    tamsulosin (FLOMAX) 0.4 MG capsule    VERAPAMIL HCL PO     No current facility-administered medications for this visit.         PEx:   PSYCH: NAD  EYES: EOMI  MOUTH: MMM  NEURO: AAO x3      A/P: Anderson Mitchell is a 63 year old male with BPH w/ LUTS, ED, renal lesion.   -Renal MRI and follow-up 1 year.   -pre-med with lorazepam 0.5mg 30 min prior. We discussed that he must have a .   -Increase Cilais to 10mg daily.   -MyChart update in 4 wks. Consider [facility] or MUSEfor ED if not improved.     Divya Valdes PA-C  [facility]        30minutes spent on the date of the encounter doing chart review, review of outside records, review of test results, interpretation of tests, patient visit and documentation

## 2023-07-21 DIAGNOSIS — M25.572 PAIN IN JOINT INVOLVING ANKLE AND FOOT, LEFT: ICD-10-CM

## 2023-07-21 DIAGNOSIS — M25.572 PAIN IN JOINT, ANKLE AND FOOT, LEFT: Primary | ICD-10-CM

## 2023-07-21 NOTE — PROGRESS NOTES
Patient seen by Dr. Hamilton 3/21/2023. Cleared for XR ankle and subtalar joint injection every 3 months.    Tara Holter RNCC

## 2023-07-31 DIAGNOSIS — M25.572 PAIN IN JOINT, ANKLE AND FOOT, LEFT: Primary | ICD-10-CM

## 2023-08-01 ENCOUNTER — OFFICE VISIT (OUTPATIENT)
Dept: ORTHOPEDICS | Facility: CLINIC | Age: 63
End: 2023-08-01
Payer: COMMERCIAL

## 2023-08-01 ENCOUNTER — ANCILLARY PROCEDURE (OUTPATIENT)
Dept: GENERAL RADIOLOGY | Facility: CLINIC | Age: 63
End: 2023-08-01
Attending: ORTHOPAEDIC SURGERY
Payer: COMMERCIAL

## 2023-08-01 DIAGNOSIS — M25.572 PAIN IN JOINT, ANKLE AND FOOT, LEFT: Primary | ICD-10-CM

## 2023-08-01 DIAGNOSIS — M25.572 PAIN IN JOINT, ANKLE AND FOOT, LEFT: ICD-10-CM

## 2023-08-01 PROCEDURE — 73610 X-RAY EXAM OF ANKLE: CPT | Mod: LT | Performed by: RADIOLOGY

## 2023-08-01 PROCEDURE — 99213 OFFICE O/P EST LOW 20 MIN: CPT | Performed by: ORTHOPAEDIC SURGERY

## 2023-08-01 NOTE — LETTER
8/1/2023         RE: Anderson Mitchell  [address]  [place] [place] [address]        Dear Colleague,    Thank you for referring your patient, Anderson Mitchell, to the [facility] [place]. Please see a copy of my visit note below.    Chief complaint left subtalar joint arthritis and ankle arthritis    Patient presents today for evaluation of his left foot.  Patient reports to have had an aggravation of his pain after stepping on a doorstep at work.  Patient is scheduled to undergo a subtalar joint injection in 3 days from now.    No physical exam was performed today    Assessment: Left ankle and subtalar joint arthritis    Plan: I discussed with the patient that at this point injections are very viable way to improve his symptoms.  At some point eventually he will be interested in the surgery and he expressed a desire to undergo surgery sometime around mid December 2023    Patient will be reevaluated around the end of October 2023.  We will require a CT scan of the foot prior to the surgery to confirm that in fact the ankle joint does not need any further attention and most of the arthritis and the pain is coming from the subtalar joint.    Questions were answered.  Patient declined the need for any work restrictions.    TT 20 minutes        Ayush Hamilton MD

## 2023-08-01 NOTE — PROGRESS NOTES
Chief complaint left subtalar joint arthritis and ankle arthritis    Patient presents today for evaluation of his left foot.  Patient reports to have had an aggravation of his pain after stepping on a doorstep at work.  Patient is scheduled to undergo a subtalar joint injection in 3 days from now.    No physical exam was performed today    Assessment: Left ankle and subtalar joint arthritis    Plan: I discussed with the patient that at this point injections are very viable way to improve his symptoms.  At some point eventually he will be interested in the surgery and he expressed a desire to undergo surgery sometime around mid December 2023    Patient will be reevaluated around the end of October 2023.  We will require a CT scan of the foot prior to the surgery to confirm that in fact the ankle joint does not need any further attention and most of the arthritis and the pain is coming from the subtalar joint.    Questions were answered.  Patient declined the need for any work restrictions.    TT 20 minutes

## 2023-08-01 NOTE — NURSING NOTE
Reason For Visit:   Chief Complaint   Patient presents with    RECHECK     Followup left ankle // pt reports that he's going to have injection this Friday        There were no vitals taken for this visit.    Pain Assessment  Patient Currently in Pain: Yes  0-10 Pain Scale: 2 (2 at rest // 6 with walking)  Primary Pain Location: Ankle (left)      Chung Castro ATC

## 2023-08-04 ENCOUNTER — ANCILLARY PROCEDURE (OUTPATIENT)
Dept: INTERVENTIONAL RADIOLOGY/VASCULAR | Facility: CLINIC | Age: 63
End: 2023-08-04
Attending: ORTHOPAEDIC SURGERY
Payer: COMMERCIAL

## 2023-08-04 DIAGNOSIS — M25.572 PAIN IN JOINT INVOLVING ANKLE AND FOOT, LEFT: ICD-10-CM

## 2023-08-04 PROCEDURE — 20605 DRAIN/INJ JOINT/BURSA W/O US: CPT | Mod: LT | Performed by: RADIOLOGY

## 2023-08-04 PROCEDURE — 77002 NEEDLE LOCALIZATION BY XRAY: CPT | Mod: GC | Performed by: RADIOLOGY

## 2023-08-04 PROCEDURE — 20605 DRAIN/INJ JOINT/BURSA W/O US: CPT | Mod: XS | Performed by: RADIOLOGY

## 2023-08-04 RX ORDER — LIDOCAINE HYDROCHLORIDE 10 MG/ML
5 INJECTION, SOLUTION EPIDURAL; INFILTRATION; INTRACAUDAL; PERINEURAL ONCE
Status: COMPLETED | OUTPATIENT
Start: 2023-08-04 | End: 2023-08-04

## 2023-08-04 RX ORDER — TRIAMCINOLONE ACETONIDE 40 MG/ML
40 INJECTION, SUSPENSION INTRA-ARTICULAR; INTRAMUSCULAR ONCE
Status: COMPLETED | OUTPATIENT
Start: 2023-08-04 | End: 2023-08-04

## 2023-08-04 RX ORDER — IOPAMIDOL 408 MG/ML
10 INJECTION, SOLUTION INTRATHECAL ONCE
Status: COMPLETED | OUTPATIENT
Start: 2023-08-04 | End: 2023-08-04

## 2023-08-04 RX ORDER — BUPIVACAINE HYDROCHLORIDE 2.5 MG/ML
10 INJECTION, SOLUTION EPIDURAL; INFILTRATION; INTRACAUDAL ONCE
Status: COMPLETED | OUTPATIENT
Start: 2023-08-04 | End: 2023-08-04

## 2023-08-04 RX ADMIN — LIDOCAINE HYDROCHLORIDE 5 ML: 10 INJECTION, SOLUTION EPIDURAL; INFILTRATION; INTRACAUDAL; PERINEURAL at 11:31

## 2023-08-04 RX ADMIN — IOPAMIDOL 3 ML: 408 INJECTION, SOLUTION INTRATHECAL at 11:31

## 2023-08-04 RX ADMIN — TRIAMCINOLONE ACETONIDE 40 MG: 40 INJECTION, SUSPENSION INTRA-ARTICULAR; INTRAMUSCULAR at 11:31

## 2023-08-04 RX ADMIN — BUPIVACAINE HYDROCHLORIDE 5 ML: 2.5 INJECTION, SOLUTION EPIDURAL; INFILTRATION; INTRACAUDAL at 11:31

## 2023-08-11 ENCOUNTER — TRANSCRIBE ORDERS (OUTPATIENT)
Dept: GASTROENTEROLOGY | Facility: CLINIC | Age: 63
End: 2023-08-11
Payer: COMMERCIAL

## 2023-08-11 ENCOUNTER — TRANSFERRED RECORDS (OUTPATIENT)
Dept: HEALTH INFORMATION MANAGEMENT | Facility: CLINIC | Age: 63
End: 2023-08-11

## 2023-08-11 DIAGNOSIS — Z12.11 COLON CANCER SCREENING: Primary | ICD-10-CM

## 2023-08-11 LAB
CHOLESTEROL (EXTERNAL): 146 MG/DL
HDLC SERPL-MCNC: 66 MG/DL (ref 40–999)
LDLC SERPL CALC-MCNC: 68 MG/DL (ref 0–130)
TRIGLYCERIDES (EXTERNAL): 62 MG/DL (ref 20–150)

## 2023-08-22 ENCOUNTER — TELEPHONE (OUTPATIENT)
Dept: GASTROENTEROLOGY | Facility: CLINIC | Age: 63
End: 2023-08-22
Payer: COMMERCIAL

## 2023-08-22 NOTE — TELEPHONE ENCOUNTER
"Endoscopy Scheduling Screen    Have you had a positive Covid test in the last 14 days?  No    Are you active on MyChart?   Yes    What insurance is in the chart?  Other:  [employer]    Ordering/Referring Provider:   CAIO JOHNSON        (If ordering provider performs procedure, schedule with ordering provider unless otherwise instructed. )    BMI: Estimated body mass index is 27.57 kg/m  as calculated from the following:    Height as of 3/27/23: 1.854 m (6' 1\").    Weight as of 3/27/23: 94.8 kg (209 lb).     Sedation Ordered  moderate sedation.   If patient BMI > 50 do not schedule in ASC.    Are you taking any prescription medications for pain?   No    Are you taking methadone or Suboxone?  No    Do you have a history of malignant hyperthermia or adverse reaction to anesthesia?  No    (Females) Are you currently pregnant?   No     Have you been diagnosed or told you have pulmonary hypertension?   No    Do you have an LVAD?  No    Have you been told you have moderate to severe sleep apnea?  No    Have you been told you have COPD, asthma, or any other lung disease?  No    Do you have any heart conditions?  No     Have you ever had or are you awaiting a heart or lung transplant?   No    Have you had a stroke or transient ischemic attack (TIA aka \"mini stroke\" in the last 6 months?   No    Have you been diagnosed with or been told you have cirrhosis of the liver?   No    Are you currently on dialysis?   No    Do you need assistance transferring?   No    BMI: Estimated body mass index is 27.57 kg/m  as calculated from the following:    Height as of 3/27/23: 1.854 m (6' 1\").    Weight as of 3/27/23: 94.8 kg (209 lb).     Is patients BMI > 40 and scheduling location [facility]?  No    Do you take the medication Phentermine, Ozempic or Wegovy?  No    Do you take the medication Naltrexone?  No    Do you take blood thinners?  No      Prep   Are you currently on dialysis or do you have chronic kidney disease?  No    Do you have " a diagnosis of diabetes?  No    Do you have a diagnosis of cystic fibrosis (CF)?  No    On a regular basis do you go 3 -5 days between bowel movements?  No    BMI > 40?  No    Preferred Pharmacy:    [id number] [address] [address]  Phone: [phone] Fax: [phone]      Final Scheduling Details   Colonoscopy prep sent?  MiraLAX (No Mag Citrate)    Procedure scheduled  Colonoscopy    Surgeon:  Evens     Date of procedure:  9/1     Schedule PAC:   No    Location  [facility]    Sedation   Moderate Sedation    Patient Reminders:   You will receive a call from a Nurse to review instructions and health history.  This assessment must be completed prior to your procedure.  Failure to complete the Nurse assessment may result in the procedure being cancelled.      On the day of your procedure, please designate an adult(s) who can drive you home stay with you for the next 24 hours. The medicines used in the exam will make you sleepy. You will not be able to drive.      You cannot take public transportation, ride share services, or non-medical taxi service without a responsible caregiver.  Medical transport services are allowed with the requirement that a responsible caregiver will receive you at your destination.  We require that drivers and caregivers are confirmed prior to your procedure.

## 2023-08-23 ENCOUNTER — TELEPHONE (OUTPATIENT)
Dept: GASTROENTEROLOGY | Facility: CLINIC | Age: 63
End: 2023-08-23
Payer: COMMERCIAL

## 2023-08-23 NOTE — TELEPHONE ENCOUNTER
Pre assessment completed for upcoming procedure.   (Please see previous telephone encounter notes for complete details)      Procedure details:    Arrival time and facility location reviewed    Pre op exam needed? N/A    Designated  policy reviewed. Instructed to have someone stay 6 hours post procedure.     COVID policy reviewed.      Medication review:    Medications reviewed. Please see supporting documentation below. Holding recommendations discussed (if applicable).       Prep for procedure:     Reviewed procedure prep instructions.       Additional information needed?  N/A      Patient  verbalized understanding and had no questions or concerns at this time.      Eloina Melgar RN  Endoscopy Procedure Pre Assessment RN  [phone] option 4

## 2023-08-23 NOTE — TELEPHONE ENCOUNTER
Pre visit planning completed.      Procedure details:    Patient scheduled for Colonoscopy  on 9.1.2023.     Arrival time: 1315. Procedure time 1400    Pre op exam needed? N/A    Facility location: [facility]; [address] [place] [address], [address] Floor, [place] [address]    Sedation type: Conscious sedation     Indication for procedure: screening colonoscopy      Chart review:     Electronic implanted devices? No    Diabetic? No    Diabetic medication HOLDING recommendations: (if applicable)  Oral diabetic medications: N/A  Diabetic injectables: N/A  Insulin: N/A      Medication review:    Anticoagulants? No    NSAIDS? No NSAID medications per patient's medication list.  RN will verify with pre-assessment call.    Other medication HOLDING recommendations:  N/A      Prep for procedure:     Bowel prep recommendation: Miralax prep without magnesium citrate   Due to:  standard bowel prep.    Prep instructions sent via T-ZONE at time of scheduling yesterday.        Eloina Melgar RN  Endoscopy Procedure Pre Assessment RN  [phone] option 4

## 2023-08-29 DIAGNOSIS — N52.9 ED (ERECTILE DYSFUNCTION): Primary | ICD-10-CM

## 2023-09-01 ENCOUNTER — HOSPITAL ENCOUNTER (OUTPATIENT)
Facility: CLINIC | Age: 63
Discharge: HOME | End: 2023-09-01
Attending: INTERNAL MEDICINE | Admitting: INTERNAL MEDICINE
Payer: COMMERCIAL

## 2023-09-01 VITALS
WEIGHT: 205 LBS | RESPIRATION RATE: 33 BRPM | OXYGEN SATURATION: 100 % | HEART RATE: 51 BPM | HEIGHT: 73 IN | BODY MASS INDEX: 27.17 KG/M2 | SYSTOLIC BLOOD PRESSURE: 139 MMHG | DIASTOLIC BLOOD PRESSURE: 88 MMHG

## 2023-09-01 LAB — COLONOSCOPY: NORMAL

## 2023-09-01 PROCEDURE — 88305 TISSUE EXAM BY PATHOLOGIST: CPT | Mod: 26

## 2023-09-01 PROCEDURE — 88305 TISSUE EXAM BY PATHOLOGIST: CPT | Mod: TC | Performed by: INTERNAL MEDICINE

## 2023-09-01 PROCEDURE — G0500 MOD SEDAT ENDO SERVICE >5YRS: HCPCS | Performed by: INTERNAL MEDICINE

## 2023-09-01 PROCEDURE — 250N000011 HC RX IP 250 OP 636: Performed by: INTERNAL MEDICINE

## 2023-09-01 PROCEDURE — 99153 MOD SED SAME PHYS/QHP EA: CPT | Mod: PT | Performed by: INTERNAL MEDICINE

## 2023-09-01 PROCEDURE — 45385 COLONOSCOPY W/LESION REMOVAL: CPT | Mod: PT | Performed by: INTERNAL MEDICINE

## 2023-09-01 RX ORDER — FENTANYL CITRATE 50 UG/ML
INJECTION, SOLUTION INTRAMUSCULAR; INTRAVENOUS PRN
Status: DISCONTINUED | OUTPATIENT
Start: 2023-09-01 | End: 2023-09-01 | Stop reason: HOSPADM

## 2023-09-01 ASSESSMENT — ACTIVITIES OF DAILY LIVING (ADL): ADLS_ACUITY_SCORE: 35

## 2023-09-01 NOTE — LETTER
"September 6, 2023      Charan Mitchell  [address]  [facility] [address]        Dear ,    We are writing to inform you of your test results.    Benign polyps removed.  A repeat exam in 3 yrs is suggested.  Brothers and sisters should check with their providers about colonoscopy.    Resulted Orders   Surgical Pathology Exam   Result Value Ref Range    Case Report       Surgical Pathology Report                         Case: [id number]                                  Authorizing Provider:  Rico Horner MD        Collected:           09/01/2023 09:41 AM          Ordering Location:     [facility]          Received:            09/01/2023 10:33 AM                                 [facility]                                                          Pathologist:           Aneta Brandon MD                                                           Specimen:    Large Intestine, Colon, Ascending, x4                                                      Final Diagnosis       Colon, polyps (x 4), polypectomy:  -Multiple fragments of tubular/tubulovillous adenomas; negative for high-grade dysplasia and malignancy.  -Defer to endoscopic impression for assessment of completeness of resection.       Comment       Colonoscopy report is reviewed; 2 ascending colon semisessile polyps (10 mm) were removed piecemeal with complete resection and retrieval.  Additionally, 2 semisessile ascending colon polyps (5-6 mm) were completely resected and retrieved.      Clinical Information       Screening colonoscopy.      Gross Description       A(1). Large Intestine, Colon, Ascending, x4:  The specimen is received in formalin, labeled with the patient's name, medical record number and other identifying information designated \"ascending colon x 4 \". It consists of multiple, less than 0.1-1.2 cm pale-tan polyps.  The 3 largest polyps are differentially inked.  The specimen is submitted entirely " as follows:    A1-A4-multiple polyp fragments  A5-2 polyps (1 inked red-bisected/1 inked blue-trisected)  A6-1 polyp (inked black-5 pieces)  (Jayna Ellis)      Microscopic Description       Microscopic examination is performed.        Performing Labs       The technical component of this testing was completed at [facility]      Case Images         If you have any questions or concerns, please call the clinic at the number listed above.       Sincerely,      Rico Horner MD

## 2023-09-01 NOTE — H&P
[facility] Anesthesia Pre-op History and Physical    Anderson Mitchell MRN# [id number]   Age: 63 year old YOB: 1960      Date of Surgery: 9/1/2023 Location [facility]      Date of Exam 9/1/2023 Facility (In hospital)       Home clinic: [facility]  Primary care provider: Rico Resendiz         Chief Complaint and/or Reason for Procedure:   No chief complaint on file.    Colonoscopy.  Exam 2012       Active problem list:     Patient Active Problem List    Diagnosis Date Noted    Heterozygous thalassemia 05/07/2013     Priority: Medium     Diagnosis updated by automated process. Provider to review and confirm.      Essential hypertension 05/07/2013     Priority: Medium     Problem list name updated by automated process. Provider to review      Shoulder pain 12/22/2009     Priority: Medium    Gastrointestinal stromal tumor (H) 05/05/2008     Priority: Medium            Medications (include herbals and vitamins):   Any Plavix use in the last 7 days? No     No current facility-administered medications for this encounter.     Current Outpatient Medications   Medication Sig    cetirizine (ZYRTEC) 10 MG tablet Take 10 mg by mouth daily    chlorthalidone (HYGROTON) 25 MG tablet Take 1 tablet by mouth daily at 2 pm    fluticasone (FLONASE) 50 MCG/ACT nasal spray     hydrochlorothiazide (HYDRODIURIL) 25 MG tablet Take 1 tablet by mouth daily    LORazepam (ATIVAN) 0.5 MG tablet Take 1 tablet (0.5 mg) by mouth every 6 hours as needed for anxiety (30 min prior to renal MRI)    Multiple Vitamin (MULTIVITAMIN) per tablet Take 1 tablet by mouth daily.    omeprazole (PRILOSEC) 40 MG DR capsule     sildenafil (VIAGRA) 50 MG tablet TAKE 1 TABLET BY MOUTH DAILY AS NEEDED 30 MINUTES PRIOR TO INTERCOURSE    tadalafil (CIALIS) 10 MG tablet Take 1 tablet (10 mg) by mouth daily as needed    tadalafil (CIALIS) 5 MG tablet Take 1 tablet (5 mg) by mouth every 24 hours     tamsulosin (FLOMAX) 0.4 MG capsule Take 0.4 mg by mouth daily    VERAPAMIL HCL PO Take 240 mg by mouth              Allergies:      Allergies   Allergen Reactions    Cats Itching and Other (See Comments)    Grass     Nkda [No Known Drug Allergy]     No Clinical Screening - See Comments Other (See Comments)    Nuts Itching    Trees      Allergy to Latex? No  Allergy to tape?   No  Intolerances:             Physical Exam:   All vitals have been reviewed  No data found.  No intake/output data recorded.  Lungs:   No increased work of breathing, good air exchange, clear to auscultation bilaterally, no crackles or wheezing     Cardiovascular:   Normal apical impulse, regular rate and rhythm, normal S1 and S2, no S3 or S4, and no murmur noted             Lab / Radiology Results:            Anesthetic risk and/or ASA classification:       Rico Horner MD

## 2023-09-05 ENCOUNTER — LAB (OUTPATIENT)
Dept: LAB | Facility: CLINIC | Age: 63
End: 2023-09-05
Payer: COMMERCIAL

## 2023-09-05 DIAGNOSIS — N52.9 ED (ERECTILE DYSFUNCTION): ICD-10-CM

## 2023-09-05 LAB
PATH REPORT.COMMENTS IMP SPEC: NORMAL
PATH REPORT.FINAL DX SPEC: NORMAL
PATH REPORT.GROSS SPEC: NORMAL
PATH REPORT.MICROSCOPIC SPEC OTHER STN: NORMAL
PATH REPORT.RELEVANT HX SPEC: NORMAL
PHOTO IMAGE: NORMAL

## 2023-09-05 PROCEDURE — 84270 ASSAY OF SEX HORMONE GLOBUL: CPT

## 2023-09-05 PROCEDURE — 36415 COLL VENOUS BLD VENIPUNCTURE: CPT

## 2023-09-05 PROCEDURE — 84403 ASSAY OF TOTAL TESTOSTERONE: CPT

## 2023-09-06 LAB — SHBG SERPL-SCNC: 65 NMOL/L (ref 11–80)

## 2023-09-08 DIAGNOSIS — N52.9 ED (ERECTILE DYSFUNCTION): Primary | ICD-10-CM

## 2023-09-08 LAB
TESTOST FREE SERPL-MCNC: 4.48 NG/DL
TESTOST SERPL-MCNC: 357 NG/DL (ref 240–950)

## 2023-09-27 ENCOUNTER — LAB (OUTPATIENT)
Dept: LAB | Facility: CLINIC | Age: 63
End: 2023-09-27
Payer: COMMERCIAL

## 2023-09-27 DIAGNOSIS — D47.2 SMOLDERING MYELOMA: ICD-10-CM

## 2023-09-27 DIAGNOSIS — N52.9 ED (ERECTILE DYSFUNCTION): ICD-10-CM

## 2023-09-27 LAB
ANION GAP SERPL CALCULATED.3IONS-SCNC: 8 MMOL/L (ref 7–15)
BUN SERPL-MCNC: 14.3 MG/DL (ref 8–23)
CALCIUM SERPL-MCNC: 9 MG/DL (ref 8.8–10.2)
CHLORIDE SERPL-SCNC: 107 MMOL/L (ref 98–107)
CREAT SERPL-MCNC: 1.09 MG/DL (ref 0.67–1.17)
DEPRECATED HCO3 PLAS-SCNC: 27 MMOL/L (ref 22–29)
EGFRCR SERPLBLD CKD-EPI 2021: 77 ML/MIN/1.73M2
ERYTHROCYTE [DISTWIDTH] IN BLOOD BY AUTOMATED COUNT: 14.3 % (ref 10–15)
GLUCOSE SERPL-MCNC: 114 MG/DL (ref 70–99)
HCT VFR BLD AUTO: 41.9 % (ref 40–53)
HGB BLD-MCNC: 14.9 G/DL (ref 13.3–17.7)
IGG SERPL-MCNC: 1402 MG/DL (ref 610–1616)
KAPPA LC FREE SER-MCNC: 1.53 MG/DL (ref 0.33–1.94)
KAPPA LC FREE/LAMBDA FREE SER NEPH: 0.33 {RATIO} (ref 0.26–1.65)
LAMBDA LC FREE SERPL-MCNC: 4.62 MG/DL (ref 0.57–2.63)
MCH RBC QN AUTO: 28.5 PG (ref 26.5–33)
MCHC RBC AUTO-ENTMCNC: 35.6 G/DL (ref 31.5–36.5)
MCV RBC AUTO: 80 FL (ref 78–100)
PLATELET # BLD AUTO: 199 10E3/UL (ref 150–450)
POTASSIUM SERPL-SCNC: 3.8 MMOL/L (ref 3.4–5.3)
RBC # BLD AUTO: 5.22 10E6/UL (ref 4.4–5.9)
SHBG SERPL-SCNC: 53 NMOL/L (ref 11–80)
SODIUM SERPL-SCNC: 142 MMOL/L (ref 135–145)
TOTAL PROTEIN SERUM FOR ELP: 6.4 G/DL (ref 6.4–8.3)
WBC # BLD AUTO: 4.1 10E3/UL (ref 4–11)

## 2023-09-27 PROCEDURE — 84165 PROTEIN E-PHORESIS SERUM: CPT | Mod: TC | Performed by: STUDENT IN AN ORGANIZED HEALTH CARE EDUCATION/TRAINING PROGRAM

## 2023-09-27 PROCEDURE — 84165 PROTEIN E-PHORESIS SERUM: CPT | Mod: 26

## 2023-09-27 PROCEDURE — 84155 ASSAY OF PROTEIN SERUM: CPT | Performed by: INTERNAL MEDICINE

## 2023-09-27 PROCEDURE — 36415 COLL VENOUS BLD VENIPUNCTURE: CPT | Performed by: PATHOLOGY

## 2023-09-27 PROCEDURE — 85027 COMPLETE CBC AUTOMATED: CPT | Performed by: PATHOLOGY

## 2023-09-27 PROCEDURE — 84403 ASSAY OF TOTAL TESTOSTERONE: CPT | Performed by: PHYSICIAN ASSISTANT

## 2023-09-27 PROCEDURE — 80048 BASIC METABOLIC PNL TOTAL CA: CPT | Performed by: PATHOLOGY

## 2023-09-27 PROCEDURE — 83521 IG LIGHT CHAINS FREE EACH: CPT | Performed by: INTERNAL MEDICINE

## 2023-09-27 PROCEDURE — 84270 ASSAY OF SEX HORMONE GLOBUL: CPT | Performed by: PHYSICIAN ASSISTANT

## 2023-09-27 PROCEDURE — 99000 SPECIMEN HANDLING OFFICE-LAB: CPT | Performed by: PATHOLOGY

## 2023-09-27 PROCEDURE — 82784 ASSAY IGA/IGD/IGG/IGM EACH: CPT | Performed by: INTERNAL MEDICINE

## 2023-09-28 LAB
ALBUMIN SERPL ELPH-MCNC: 3.9 G/DL (ref 3.7–5.1)
ALPHA1 GLOB SERPL ELPH-MCNC: 0.2 G/DL (ref 0.2–0.4)
ALPHA2 GLOB SERPL ELPH-MCNC: 0.5 G/DL (ref 0.5–0.9)
B-GLOBULIN SERPL ELPH-MCNC: 0.6 G/DL (ref 0.6–1)
GAMMA GLOB SERPL ELPH-MCNC: 1.3 G/DL (ref 0.7–1.6)
M PROTEIN SERPL ELPH-MCNC: 0.9 G/DL
PROT PATTERN SERPL ELPH-IMP: ABNORMAL
TESTOST FREE SERPL-MCNC: 5.75 NG/DL
TESTOST SERPL-MCNC: 389 NG/DL (ref 240–950)

## 2023-10-05 NOTE — RESULT ENCOUNTER NOTE
Dear Mr. Zhuhear,    Blood test are overall stable.    Please, call me with any questions.    Jackie Braden MD

## 2023-10-10 ENCOUNTER — OFFICE VISIT (OUTPATIENT)
Dept: ORTHOPEDICS | Facility: CLINIC | Age: 63
End: 2023-10-10
Payer: COMMERCIAL

## 2023-10-10 ENCOUNTER — ANCILLARY PROCEDURE (OUTPATIENT)
Dept: CT IMAGING | Facility: CLINIC | Age: 63
End: 2023-10-10
Attending: ORTHOPAEDIC SURGERY
Payer: COMMERCIAL

## 2023-10-10 DIAGNOSIS — M25.572 PAIN IN JOINT, ANKLE AND FOOT, LEFT: Primary | ICD-10-CM

## 2023-10-10 DIAGNOSIS — M25.572 PAIN IN JOINT, ANKLE AND FOOT, LEFT: ICD-10-CM

## 2023-10-10 PROCEDURE — 99214 OFFICE O/P EST MOD 30 MIN: CPT | Performed by: ORTHOPAEDIC SURGERY

## 2023-10-10 PROCEDURE — 73700 CT LOWER EXTREMITY W/O DYE: CPT | Mod: LT | Performed by: RADIOLOGY

## 2023-10-10 NOTE — LETTER
10/10/2023         RE: Anderson Mitchell  [address]  [facility] [address]        Dear Colleague,    Thank you for referring your patient, Anderson Mitchell, to the [facility] [place]. Please see a copy of my visit note below.    Chief complaint: Left subtalar joint arthritis    Patient presents today for further follow-up.  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    And no physical exam was performed today.    Assessment: #1 left shoulder arthritis.  #2 left left ankle lateral gutter impingement    Plan: I discussed with the patient that at this point the plan will be to undergo a subtalar joint arthrodesis with a partial fibula excision on the left ankle.    Discussed with him the most likely postoperative course and locations from undergoing such intervention which include but not limited to infection bleeding nerve damage residual pain nonunion and stiffness    Patient is planning to proceed with the surgery sometime in December 2023.  In the meantime he has no restrictions.    All questions were answered.    TT 20 minutes      Ayush Hamilton MD

## 2023-10-10 NOTE — PROGRESS NOTES
Chief complaint: Left subtalar joint arthritis    Patient presents today for further follow-up.  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    And no physical exam was performed today.    Assessment: #1 left shoulder arthritis.  #2 left left ankle lateral gutter impingement    Plan: I discussed with the patient that at this point the plan will be to undergo a subtalar joint arthrodesis with a partial fibula excision on the left ankle.    Discussed with him the most likely postoperative course and locations from undergoing such intervention which include but not limited to infection bleeding nerve damage residual pain nonunion and stiffness    Patient is planning to proceed with the surgery sometime in December 2023.  In the meantime he has no restrictions.    All questions were answered.    TT 20 minutes

## 2023-10-10 NOTE — NURSING NOTE
Reason For Visit:   Chief Complaint   Patient presents with    RECHECK     Left ankle CT results       There were no vitals taken for this visit.         Nova Chiu ATC

## 2023-10-11 ENCOUNTER — TELEPHONE (OUTPATIENT)
Dept: ORTHOPEDICS | Facility: CLINIC | Age: 63
End: 2023-10-11

## 2023-10-11 NOTE — TELEPHONE ENCOUNTER
Phoned patient to schedule surgery with Dr Hamilton. Patient was driving at the time and requested I call back later this afternoon.

## 2023-10-13 ENCOUNTER — MEDICAL CORRESPONDENCE (OUTPATIENT)
Dept: HEALTH INFORMATION MANAGEMENT | Facility: CLINIC | Age: 63
End: 2023-10-13
Payer: COMMERCIAL

## 2023-10-13 ENCOUNTER — TRANSFERRED RECORDS (OUTPATIENT)
Dept: HEALTH INFORMATION MANAGEMENT | Facility: CLINIC | Age: 63
End: 2023-10-13
Payer: COMMERCIAL

## 2023-10-17 ENCOUNTER — TRANSCRIBE ORDERS (OUTPATIENT)
Dept: OTHER | Age: 63
End: 2023-10-17

## 2023-10-17 DIAGNOSIS — C16.9 GASTRIC CARCINOMA (H): Primary | ICD-10-CM

## 2023-11-09 ENCOUNTER — ONCOLOGY VISIT (OUTPATIENT)
Dept: ONCOLOGY | Facility: CLINIC | Age: 63
End: 2023-11-09
Attending: INTERNAL MEDICINE
Payer: COMMERCIAL

## 2023-11-09 VITALS
TEMPERATURE: 98.2 F | RESPIRATION RATE: 16 BRPM | OXYGEN SATURATION: 100 % | SYSTOLIC BLOOD PRESSURE: 164 MMHG | WEIGHT: 217 LBS | HEART RATE: 52 BPM | DIASTOLIC BLOOD PRESSURE: 84 MMHG | BODY MASS INDEX: 28.63 KG/M2

## 2023-11-09 DIAGNOSIS — D47.2 SMOLDERING MYELOMA: Primary | ICD-10-CM

## 2023-11-09 PROCEDURE — 99214 OFFICE O/P EST MOD 30 MIN: CPT | Performed by: INTERNAL MEDICINE

## 2023-11-09 ASSESSMENT — PAIN SCALES - GENERAL: PAINLEVEL: NO PAIN (0)

## 2023-11-09 NOTE — PROGRESS NOTES
"Oncology Rooming Note    November 9, 2023 9:53 AM   Anderson Mitchell is a 63 year old male who presents for:    Chief Complaint   Patient presents with    Oncology Clinic Visit     Initial Vitals: BP (!) 164/84   Pulse 52   Temp 98.2  F (36.8  C) (Oral)   Resp 16   Wt 98.4 kg (217 lb)   SpO2 100%   BMI 28.63 kg/m   Estimated body mass index is 28.63 kg/m  as calculated from the following:    Height as of 9/1/23: 1.854 m (6' 1\").    Weight as of this encounter: 98.4 kg (217 lb). Body surface area is 2.25 meters squared.  No Pain (0) Comment: Data Unavailable   No LMP for male patient.  Allergies reviewed: Yes  Medications reviewed: Yes    Medications: Medication refills not needed today.  Pharmacy [facility] into [id number] [place] [address] [facility]    Clinical concerns:   doctor was notified.      Andreina Harvey CMA            "

## 2023-11-09 NOTE — PROGRESS NOTES
Date of Service: 06/16/2020    SURGEON:  Zechariah Nava MD.    ASSISTANT(S):  Estefania Sauceda SA.     PREOPERATIVE DIAGNOSIS:  1.  Epiretinal membrane, right eye.  2.  History of rhegmatogenous retinal detachment repair, remotely, right eye.  3.  Mixed cataract, right eye.    POSTOPERATIVE DIAGNOSIS:  1.  Epiretinal membrane, right eye.  2.  History of rhegmatogenous retinal detachment repair, remotely, right eye.  3.  Mixed cataract, right eye.    PROCEDURES PERFORMED:  1.  A 25-gauge pars plana vitrectomy, right eye.  2.  Internal limiting membrane epiretinal membrane removal, right eye.  3.  Endolaser 1540 total applications, right eye.  4.  Air-fluid exchange, right eye.  5.  C3F8 15% vitreous gas substitute, right eye.    ANESTHESIA:  MAC with retrobulbar block.    COMPLICATIONS:  None.    ESTIMATED BLOOD LOSS:  Less than 5 mL.    PROCEDURE IN DETAIL:  Risks, benefits and alternatives discussed.  Informed consent was obtained.  The patient's operative eye was dilated and marked.  The patient was rolled to the operating room.  MAC anesthesia began without complication.  Operative eye was then prepped and draped in usual sterile ophthalmic fashion using 5% Betadine.  A lid speculum was then placed for eye exposure.    A 25-gauge pars plana vitrectomy platform was then fashioned.  Trocars were inserted 4 mm from the limbus given the phakic status of the eye.  Infusion was confirmed in vitreous cavity prior to being turned on to 30 mmHg.  Using a light pipe and vitrector, a core and anterior vitrectomy performed under the BIOM viewing system without the aid of intravitreal Kenalog.  There was previous vitrectomy, so minimal residual vitreous left 360 degrees laser was noted.  There were signs of some far anterior scar tissue barricaded by laser temporally and superotemporally, which I would supplement laser to the margins later in the case.  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  -In 2008, EGD revealed a submucosal smooth nodule 5 cm near the antrum of the stomach. Had wedge gastrectomy on 05/05/2008.  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       1.  On 02/03/2021:  -Normal calcium and creatinine.  -Normal hemoglobin A1c.  -Normal TSH.     2.  Multiple labs done on 03/15/2021:    -Normal LFT.  -Normal copper.  -Normal zinc.  -Normal vitamin D.  -SPEP revealed monoclonal peak of 0.8.  -Serum immunofixation reveals monoclonal IgG lambda.  -IgG level of 1562.     3.  On 04/01/2021:  -Gause free light chain of 1.27 and lambda free light chain of 2.88. Normal ratio.  -Urine immunofixation reveals faint monoclonal IgG lambda.     4.  Multiple CBCs have been done in the past.  WBC, hemoglobin and platelet have been normal.  MCV has been mildly low around 77.  The patient carries a diagnosis of thalassemia trait.     5. Bone marrow biopsy on 07/01/2021 reveals 10% to 15% lambda monotypic plasma cell.  Bone marrow was 40% cellularity with trilineage hematopoiesis.  No amyloid deposits.  Flow cytometry reveals lambda monotypic plasma cells.  -FISH : Gain of 1q (1%), Gain of 11q (68%), Loss of 13q14 (35%). Monosomy 13 (15%) and rearrangement of IGH with (34%) or without (28%) gain of 5' (telomeric) signal   -46,XY[19]     6. Skeletal survey on 07/12/2021 revealed degenerative changes.  No lytic lesion or compression fracture.     SUBJECTIVE:  Mr. Mitchell is a 63-year-old gentleman with smoldering myeloma on observation.      He is doing well. No complaints. He is very active and runs few miles daily. No headache.  No dizziness.  No chest pain.  No shortness of breath.  No abdominal pain, nausea or vomiting.  No urinary or bowel complaints. No bone pain. He has mild peripheral neuropathy. He gets intermittent numbness in the fingers and the bottom of the feet. No fever or night sweats. All other review of systems  negative.     PHYSICAL EXAMINATION:   GENERAL:  Alert and oriented x 3.   VITAL SIGNS:  Reviewed.  ECOG PS of 0.     EYES:  No icterus.   NECK:  Supple. No lymphadenopathy. No thyromegaly.   AXILLAE:  No lymphadenopathy.   LUNGS:  Good air entry bilaterally.  No crackles or wheezing.   HEART:  Regular.  No murmur.   GI: Abdomen is soft.  Nontender. No mass.   EXTREMITIES:  No pedal edema.  No calf swelling or tenderness.   SKIN:  No rash.      LABS:  CBC, BMP, free light chain, IgG level and SPEP done on 09/27/2023 reviewed.     ASSESSMENT:    1.  A 63-year-old gentleman with smoldering myeloma. Stable.  2.  Peripheral neuropathy, stable.       PLAN:  1.  Patient is doing well.  He is asymptomatic.    Labs were reviewed with him.  Explained to him that smoldering myeloma is stable.    2.  We will continue to monitor him.  I will see him in 6 months time with labs. Advised him to call us with any questions or concerns.    3.  He is scheduled for ankle surgery end of December.  No contraindication from hematology side for surgery.     Total visit time of 30 minutes.  Time spent in today's visit, review of chart/investigations today and documentation today.   superior temporal dragging of the fovea.  Dilute ICG dye was injected over the macula and removed using a backflush needle.  Using a pinch and peel technique under handheld contact lens, this fibrotic epiretinal membrane and some underlying internal limiting membrane were removed atraumatically from the central macula.  There was severe distortion.  I did not aggressively dissect perifoveal epiretinal membrane to avoid making any iatrogenic defects.  Some scar tissue was located inferiorly in the 6:00 position in the mid peripheral retina, which was removed using forceps.  There were no underlying defects,  I would strengthen the retina in this area using laser additionally, laser near 6:30-7:30 was removed on the margin of previous laser without defect.  Some was left due to the far peripheral nature of this.  Laser was then supplemented around these areas.  Everything looked appropriate at the end of the case.  Air-fluid exchange was then performed.  C3F8 15% gas flush placed in the infusion line.  Eye was then closed using 7-0 Vicryl in interrupted fashion.  Betadine was rinsed over the right eye.  Subconjunctival Ancef injected inferiorly.  Speculum was removed.  TobraDex ointment was then placed.  Pressure patch and shield were placed.  The patient is to recovery in stable condition.  No complications.  Instructed to followup postoperative day 1.        Dictated By: Zechariah Nava MD  Signing Provider: Zechariah Nava MD    NT/SP2 ([id number])  DD: 06/16/2020 11:44:11 TD: 06/16/2020 11:57:06    Copy Sent To:

## 2023-11-09 NOTE — LETTER
"    11/9/2023         RE: Anderson Mitchell  [address]  [facility] [address]        Dear Colleague,    Thank you for referring your patient, Anderson Mitchell, to the [facility]. Please see a copy of my visit note below.    Oncology Rooming Note    November 9, 2023 9:53 AM   Anderson Mitchell is a 63 year old male who presents for:    Chief Complaint   Patient presents with     Oncology Clinic Visit     Initial Vitals: BP (!) 164/84   Pulse 52   Temp 98.2  F (36.8  C) (Oral)   Resp 16   Wt 98.4 kg (217 lb)   SpO2 100%   BMI 28.63 kg/m   Estimated body mass index is 28.63 kg/m  as calculated from the following:    Height as of 9/1/23: 1.854 m (6' 1\").    Weight as of this encounter: 98.4 kg (217 lb). Body surface area is 2.25 meters squared.  No Pain (0) Comment: Data Unavailable   No LMP for male patient.  Allergies reviewed: Yes  Medications reviewed: Yes    Medications: Medication refills not needed today.  Pharmacy [facility] entered into [id number] [place] [address] [address] [facility]    Clinical concerns:   doctor was notified.      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  -In 2008, EGD revealed a submucosal smooth nodule 5 cm near the antrum of the stomach. Had wedge gastrectomy on 05/05/2008.  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       1.  On 02/03/2021:  -Normal calcium and creatinine.  -Normal hemoglobin A1c.  -Normal TSH.     2.  Multiple labs done on 03/15/2021:    -Normal LFT.  -Normal copper.  -Normal zinc.  -Normal vitamin D.  -SPEP revealed monoclonal peak of 0.8.  -Serum immunofixation reveals monoclonal IgG lambda.  -IgG level of 1562.     3.  On 04/01/2021:  -Netos free light chain of 1.27 and lambda free light chain of 2.88. Normal ratio.  -Urine immunofixation reveals faint monoclonal IgG lambda.     4.  Multiple CBCs have " been done in the past.  WBC, hemoglobin and platelet have been normal.  MCV has been mildly low around 77.  The patient carries a diagnosis of thalassemia trait.     5. Bone marrow biopsy on 07/01/2021 reveals 10% to 15% lambda monotypic plasma cell.  Bone marrow was 40% cellularity with trilineage hematopoiesis.  No amyloid deposits.  Flow cytometry reveals lambda monotypic plasma cells.  -FISH : Gain of 1q (1%), Gain of 11q (68%), Loss of 13q14 (35%). Monosomy 13 (15%) and rearrangement of IGH with (34%) or without (28%) gain of 5' (telomeric) signal   -46,XY[19]     6. Skeletal survey on 07/12/2021 revealed degenerative changes.  No lytic lesion or compression fracture.     SUBJECTIVE:  Mr. Mitchell is a 63-year-old gentleman with smoldering myeloma on observation.      He is doing well. No complaints. He is very active and runs few miles daily. No headache.  No dizziness.  No chest pain.  No shortness of breath.  No abdominal pain, nausea or vomiting.  No urinary or bowel complaints. No bone pain. He has mild peripheral neuropathy. He gets intermittent numbness in the fingers and the bottom of the feet. No fever or night sweats. All other review of systems negative.     PHYSICAL EXAMINATION:   GENERAL:  Alert and oriented x 3.   VITAL SIGNS:  Reviewed.  ECOG PS of 0.     EYES:  No icterus.   NECK:  Supple. No lymphadenopathy. No thyromegaly.   AXILLAE:  No lymphadenopathy.   LUNGS:  Good air entry bilaterally.  No crackles or wheezing.   HEART:  Regular.  No murmur.   GI: Abdomen is soft.  Nontender. No mass.   EXTREMITIES:  No pedal edema.  No calf swelling or tenderness.   SKIN:  No rash.      LABS:  CBC, BMP, free light chain, IgG level and SPEP done on 09/27/2023 reviewed.     ASSESSMENT:    1.  A 63-year-old gentleman with smoldering myeloma. Stable.  2.  Peripheral neuropathy, stable.       PLAN:  1.  Patient is doing well.  He is asymptomatic.    Labs were reviewed with him.  Explained to him that  smoldering myeloma is stable.    2.  We will continue to monitor him.  I will see him in 6 months time with labs. Advised him to call us with any questions or concerns.    3.  He is scheduled for ankle surgery end of December.  No contraindication from hematology side for surgery.     Total visit time of 30 minutes.  Time spent in today's visit, review of chart/investigations today and documentation today.      Again, thank you for allowing me to participate in the care of your patient.        Sincerely,        Jackie Braden MD

## 2023-11-16 ENCOUNTER — DOCUMENTATION ONLY (OUTPATIENT)
Dept: ORTHOPEDICS | Facility: CLINIC | Age: 63
End: 2023-11-16
Payer: COMMERCIAL

## 2023-11-16 NOTE — PROGRESS NOTES
Received Completed forms Yes   Faxed Forms Faxed To: [facility]  Fax Number: [phone]   Sent to HIM (Date) 11/14/23

## 2023-12-04 ENCOUNTER — TRANSFERRED RECORDS (OUTPATIENT)
Dept: HEALTH INFORMATION MANAGEMENT | Facility: CLINIC | Age: 63
End: 2023-12-04
Payer: COMMERCIAL

## 2023-12-04 LAB
CREATININE (EXTERNAL): 1.11 MG/DL (ref 0.7–1.3)
GFR ESTIMATED (EXTERNAL): >60 ML/MIN/1.73M^2
GLUCOSE (EXTERNAL): 98 MG/DL (ref 70–124)
POTASSIUM (EXTERNAL): 3.8 MMOL/L (ref 3.4–5.3)

## 2023-12-26 ENCOUNTER — ANESTHESIA EVENT (OUTPATIENT)
Dept: SURGERY | Facility: CLINIC | Age: 63
End: 2023-12-26
Payer: COMMERCIAL

## 2023-12-26 ASSESSMENT — LIFESTYLE VARIABLES: TOBACCO_USE: 1

## 2023-12-26 ASSESSMENT — ENCOUNTER SYMPTOMS: SEIZURES: 0

## 2023-12-26 NOTE — ANESTHESIA PREPROCEDURE EVALUATION
Anesthesia Pre-Procedure Evaluation    Patient: Anderson Mitchell   MRN: [id number] : 1960        Procedure : Procedure(s):  Subtalar Joint Fusion and Partial Fibula Excision          Past Medical History:   Diagnosis Date     Anxiety 2016    not Medicated     Arthritis     tr knee and both ankles     Chronic constipation NOW    from Re-flux med     Fracture 2016    lower lt ankle     Gastrointestinal stromal tumor (H) 2008     Hernia, abdominal      Hypertension      Malignant neoplasm (H)     gastrointestinal mass-removed      Mumps      Sleep apnea      Thalassaemia trait 2013      Past Surgical History:   Procedure Laterality Date     ABDOMEN SURGERY       BONE MARROW BIOPSY, BONE SPECIMEN, NEEDLE/TROCAR N/A 2021    Procedure: BONE MARROW BIOPSY;  Surgeon: Ivette Hassan MD;  Location:  GI     COLONOSCOPY  2012    Procedure:COLONOSCOPY; Surgeon:TAMIE REYNA; Location: GI     COLONOSCOPY N/A 2023    Procedure: Colonoscopy;  Surgeon: Rico Horner MD;  Location:  GI     ESOPHAGOSCOPY, GASTROSCOPY, DUODENOSCOPY (EGD), COMBINED N/A 2020    Procedure: ESOPHAGOGASTRODUODENOSCOPY, WITH BIOPSY;  Surgeon: Tejas Bradley MD;  [facility]: [facility]     HERNIA REPAIR       ORTHOPEDIC SURGERY        Allergies   Allergen Reactions     Cats Itching and Other (See Comments)     Grass      Nkda [No Known Drug Allergy]      No Clinical Screening - See Comments Other (See Comments)     Nuts Itching     Trees       Social History     Tobacco Use     Smoking status: Former     Packs/day: 0.00     Years: 0.00     Additional pack years: 0.00     Total pack years: 0.00     Types: Cigars, Cigarettes     Smokeless tobacco: Never     Tobacco comments:     OCC. CIGAR   Substance Use Topics     Alcohol use: Yes     Alcohol/week: 0.0 standard drinks of alcohol     Comment: 2 a week      Wt Readings from Last 1 Encounters:   23 98.4 kg (217 lb)     "    Anesthesia Evaluation   Pt has had prior anesthetic. Type: General.    No history of anesthetic complications       ROS/MED HX  ENT/Pulmonary:     (+) sleep apnea, mild,    YUNG risk factors,           tobacco use, Past use,                    (-) asthma   Neurologic:     (+)    peripheral neuropathy, - peripheral.                        (-) no seizures and no CVA   Cardiovascular:     (+)  hypertension- -   -  - -                                      METS/Exercise Tolerance:     Hematologic: Comments: MGUS  Light chain disease      Musculoskeletal:       GI/Hepatic: Comment: GIST tumor removed in 2008   (-) GERD   Renal/Genitourinary:       Endo:  - neg endo ROS     Psychiatric/Substance Use:     (+) psychiatric history anxiety       Infectious Disease:       Malignancy:   (+) Malignancy, History of GI.GI CA  Remission status post Surgery.      Other:            Physical Exam    Airway        Mallampati: II   TM distance: > 3 FB   Neck ROM: full   Mouth opening: > 3 cm    Respiratory Devices and Support         Dental           Cardiovascular   cardiovascular exam normal          Pulmonary   pulmonary exam normal            OUTSIDE LABS:  CBC:   Lab Results   Component Value Date    WBC 4.1 09/27/2023    WBC 4.0 02/08/2023    HGB 14.9 09/27/2023    HGB 15.1 02/08/2023    HCT 41.9 09/27/2023    HCT 41.2 02/08/2023     09/27/2023     02/08/2023     BMP:   Lab Results   Component Value Date     09/27/2023     02/08/2023    POTASSIUM 3.8 09/27/2023    POTASSIUM 3.8 02/08/2023    CHLORIDE 107 09/27/2023    CHLORIDE 101 02/08/2023    CO2 27 09/27/2023    CO2 29 02/08/2023    BUN 14.3 09/27/2023    BUN 17.7 02/08/2023    CR 1.09 09/27/2023    CR 1.14 02/08/2023     (H) 09/27/2023     (H) 02/08/2023     COAGS: No results found for: \"PTT\", \"INR\", \"FIBR\"  POC: No results found for: \"BGM\", \"HCG\", \"HCGS\"  HEPATIC:   Lab Results   Component Value Date    ALBUMIN 3.8 03/15/2021    " PROTTOTAL 7.5 03/15/2021    ALT 42 03/15/2021    AST 28 03/15/2021    ALKPHOS 61 03/15/2021    BILITOTAL 0.5 03/15/2021     OTHER:   Lab Results   Component Value Date    JAMES 9.0 09/27/2023       Anesthesia Plan    ASA Status:  2       Anesthesia Type: General.     - Airway: LMA   Induction: Intravenous.   Maintenance: Balanced.        Consents    Anesthesia Plan(s) and associated risks, benefits, and realistic alternatives discussed. Questions answered and patient/representative(s) expressed understanding.     - Discussed: Risks, Benefits and Alternatives for BOTH SEDATION and the PROCEDURE were discussed     - Discussed with:  Patient       Use of blood products discussed: No .     Postoperative Care    Pain management: IV analgesics, Peripheral nerve block (Single Shot), Multi-modal analgesia, Oral pain medications.   PONV prophylaxis: Ondansetron (or other 5HT-3), Dexamethasone or Solumedrol     Comments:               Anderson Lance MD    I have reviewed the pertinent notes and labs in the chart from the past 30 days and (re)examined the patient.  Any updates or changes from those notes are reflected in this note.

## 2023-12-27 ENCOUNTER — HOSPITAL ENCOUNTER (OUTPATIENT)
Facility: CLINIC | Age: 63
Discharge: HOME | End: 2023-12-27
Attending: ORTHOPAEDIC SURGERY | Admitting: ORTHOPAEDIC SURGERY
Payer: COMMERCIAL

## 2023-12-27 ENCOUNTER — MYC MEDICAL ADVICE (OUTPATIENT)
Dept: ORTHOPEDICS | Facility: CLINIC | Age: 63
End: 2023-12-27

## 2023-12-27 ENCOUNTER — ANESTHESIA (OUTPATIENT)
Dept: SURGERY | Facility: CLINIC | Age: 63
End: 2023-12-27
Payer: COMMERCIAL

## 2023-12-27 ENCOUNTER — APPOINTMENT (OUTPATIENT)
Dept: GENERAL RADIOLOGY | Facility: CLINIC | Age: 63
End: 2023-12-27
Attending: ORTHOPAEDIC SURGERY
Payer: COMMERCIAL

## 2023-12-27 VITALS
SYSTOLIC BLOOD PRESSURE: 127 MMHG | DIASTOLIC BLOOD PRESSURE: 76 MMHG | HEART RATE: 42 BPM | OXYGEN SATURATION: 100 % | HEIGHT: 73 IN | BODY MASS INDEX: 28.78 KG/M2 | TEMPERATURE: 98.2 F | WEIGHT: 217.15 LBS | RESPIRATION RATE: 16 BRPM

## 2023-12-27 DIAGNOSIS — Z98.890 STATUS POST SURGERY: Primary | ICD-10-CM

## 2023-12-27 DIAGNOSIS — M25.572 LEFT ANKLE PAIN, UNSPECIFIED CHRONICITY: Primary | ICD-10-CM

## 2023-12-27 DIAGNOSIS — M25.572 LEFT ANKLE PAIN, UNSPECIFIED CHRONICITY: ICD-10-CM

## 2023-12-27 PROCEDURE — 250N000009 HC RX 250: Performed by: ORTHOPAEDIC SURGERY

## 2023-12-27 PROCEDURE — 250N000009 HC RX 250: Performed by: STUDENT IN AN ORGANIZED HEALTH CARE EDUCATION/TRAINING PROGRAM

## 2023-12-27 PROCEDURE — 999N000141 HC STATISTIC PRE-PROCEDURE NURSING ASSESSMENT: Performed by: ORTHOPAEDIC SURGERY

## 2023-12-27 PROCEDURE — 258N000003 HC RX IP 258 OP 636

## 2023-12-27 PROCEDURE — 999N000180 XR SURGERY CARM FLUORO LESS THAN 5 MIN: Mod: TC

## 2023-12-27 PROCEDURE — C1713 ANCHOR/SCREW BN/BN,TIS/BN: HCPCS | Performed by: ORTHOPAEDIC SURGERY

## 2023-12-27 PROCEDURE — 250N000011 HC RX IP 250 OP 636: Mod: JZ | Performed by: STUDENT IN AN ORGANIZED HEALTH CARE EDUCATION/TRAINING PROGRAM

## 2023-12-27 PROCEDURE — 250N000011 HC RX IP 250 OP 636: Performed by: PHYSICIAN ASSISTANT

## 2023-12-27 PROCEDURE — 370N000017 HC ANESTHESIA TECHNICAL FEE, PER MIN: Performed by: ORTHOPAEDIC SURGERY

## 2023-12-27 PROCEDURE — 250N000025 HC SEVOFLURANE, PER MIN: Performed by: ORTHOPAEDIC SURGERY

## 2023-12-27 PROCEDURE — 271N000001 HC OR GENERAL SUPPLY NON-STERILE: Performed by: ORTHOPAEDIC SURGERY

## 2023-12-27 PROCEDURE — 250N000011 HC RX IP 250 OP 636

## 2023-12-27 PROCEDURE — 360N000083 HC SURGERY LEVEL 3 W/ FLUORO, PER MIN: Performed by: ORTHOPAEDIC SURGERY

## 2023-12-27 PROCEDURE — 250N000009 HC RX 250

## 2023-12-27 PROCEDURE — 710N000012 HC RECOVERY PHASE 2, PER MINUTE: Performed by: ORTHOPAEDIC SURGERY

## 2023-12-27 PROCEDURE — 710N000010 HC RECOVERY PHASE 1, LEVEL 2, PER MIN: Performed by: ORTHOPAEDIC SURGERY

## 2023-12-27 PROCEDURE — 250N000011 HC RX IP 250 OP 636: Performed by: STUDENT IN AN ORGANIZED HEALTH CARE EDUCATION/TRAINING PROGRAM

## 2023-12-27 PROCEDURE — 272N000001 HC OR GENERAL SUPPLY STERILE: Performed by: ORTHOPAEDIC SURGERY

## 2023-12-27 PROCEDURE — 272N000002 HC OR SUPPLY OTHER OPNP: Performed by: ORTHOPAEDIC SURGERY

## 2023-12-27 PROCEDURE — 250N000013 HC RX MED GY IP 250 OP 250 PS 637

## 2023-12-27 DEVICE — IMPLANTABLE DEVICE: Type: IMPLANTABLE DEVICE | Site: ANKLE | Status: FUNCTIONAL

## 2023-12-27 RX ORDER — DEXAMETHASONE SODIUM PHOSPHATE 10 MG/ML
INJECTION, SOLUTION INTRAMUSCULAR; INTRAVENOUS
Status: COMPLETED | OUTPATIENT
Start: 2023-12-27 | End: 2023-12-27

## 2023-12-27 RX ORDER — LIDOCAINE 40 MG/G
CREAM TOPICAL
Status: DISCONTINUED | OUTPATIENT
Start: 2023-12-27 | End: 2023-12-27 | Stop reason: HOSPADM

## 2023-12-27 RX ORDER — SODIUM CHLORIDE, SODIUM LACTATE, POTASSIUM CHLORIDE, CALCIUM CHLORIDE 600; 310; 30; 20 MG/100ML; MG/100ML; MG/100ML; MG/100ML
INJECTION, SOLUTION INTRAVENOUS CONTINUOUS PRN
Status: DISCONTINUED | OUTPATIENT
Start: 2023-12-27 | End: 2023-12-27

## 2023-12-27 RX ORDER — HYDROCODONE BITARTRATE AND ACETAMINOPHEN 5; 325 MG/1; MG/1
1 TABLET ORAL
Status: DISCONTINUED | OUTPATIENT
Start: 2023-12-27 | End: 2023-12-27 | Stop reason: HOSPADM

## 2023-12-27 RX ORDER — ONDANSETRON 2 MG/ML
4 INJECTION INTRAMUSCULAR; INTRAVENOUS EVERY 30 MIN PRN
Status: DISCONTINUED | OUTPATIENT
Start: 2023-12-27 | End: 2023-12-27 | Stop reason: HOSPADM

## 2023-12-27 RX ORDER — PROPOFOL 10 MG/ML
INJECTION, EMULSION INTRAVENOUS PRN
Status: DISCONTINUED | OUTPATIENT
Start: 2023-12-27 | End: 2023-12-27

## 2023-12-27 RX ORDER — DEXAMETHASONE SODIUM PHOSPHATE 4 MG/ML
INJECTION, SOLUTION INTRA-ARTICULAR; INTRALESIONAL; INTRAMUSCULAR; INTRAVENOUS; SOFT TISSUE PRN
Status: DISCONTINUED | OUTPATIENT
Start: 2023-12-27 | End: 2023-12-27

## 2023-12-27 RX ORDER — DEXMEDETOMIDINE HYDROCHLORIDE 4 UG/ML
INJECTION, SOLUTION INTRAVENOUS
Status: COMPLETED | OUTPATIENT
Start: 2023-12-27 | End: 2023-12-27

## 2023-12-27 RX ORDER — ONDANSETRON 2 MG/ML
INJECTION INTRAMUSCULAR; INTRAVENOUS PRN
Status: DISCONTINUED | OUTPATIENT
Start: 2023-12-27 | End: 2023-12-27

## 2023-12-27 RX ORDER — OXYCODONE HYDROCHLORIDE 10 MG/1
10 TABLET ORAL
Status: DISCONTINUED | OUTPATIENT
Start: 2023-12-27 | End: 2023-12-27 | Stop reason: HOSPADM

## 2023-12-27 RX ORDER — NALOXONE HYDROCHLORIDE 0.4 MG/ML
0.2 INJECTION, SOLUTION INTRAMUSCULAR; INTRAVENOUS; SUBCUTANEOUS
Status: DISCONTINUED | OUTPATIENT
Start: 2023-12-27 | End: 2023-12-27 | Stop reason: HOSPADM

## 2023-12-27 RX ORDER — MAGNESIUM HYDROXIDE 1200 MG/15ML
LIQUID ORAL PRN
Status: DISCONTINUED | OUTPATIENT
Start: 2023-12-27 | End: 2023-12-27 | Stop reason: HOSPADM

## 2023-12-27 RX ORDER — HYDROXYZINE HYDROCHLORIDE 25 MG/1
25 TABLET, FILM COATED ORAL
Status: DISCONTINUED | OUTPATIENT
Start: 2023-12-27 | End: 2023-12-27 | Stop reason: HOSPADM

## 2023-12-27 RX ORDER — CEFAZOLIN SODIUM/WATER 2 G/20 ML
2 SYRINGE (ML) INTRAVENOUS SEE ADMIN INSTRUCTIONS
Status: DISCONTINUED | OUTPATIENT
Start: 2023-12-27 | End: 2023-12-27 | Stop reason: HOSPADM

## 2023-12-27 RX ORDER — ACETAMINOPHEN 325 MG/1
975 TABLET ORAL ONCE
Status: COMPLETED | OUTPATIENT
Start: 2023-12-27 | End: 2023-12-27

## 2023-12-27 RX ORDER — BUPIVACAINE HYDROCHLORIDE AND EPINEPHRINE 2.5; 5 MG/ML; UG/ML
INJECTION, SOLUTION INFILTRATION; PERINEURAL
Status: COMPLETED | OUTPATIENT
Start: 2023-12-27 | End: 2023-12-27

## 2023-12-27 RX ORDER — LIDOCAINE HYDROCHLORIDE 20 MG/ML
INJECTION, SOLUTION INFILTRATION; PERINEURAL PRN
Status: DISCONTINUED | OUTPATIENT
Start: 2023-12-27 | End: 2023-12-27

## 2023-12-27 RX ORDER — OXYCODONE HYDROCHLORIDE 5 MG/1
5 TABLET ORAL
Status: DISCONTINUED | OUTPATIENT
Start: 2023-12-27 | End: 2023-12-27 | Stop reason: HOSPADM

## 2023-12-27 RX ORDER — FLUMAZENIL 0.1 MG/ML
0.2 INJECTION, SOLUTION INTRAVENOUS
Status: DISCONTINUED | OUTPATIENT
Start: 2023-12-27 | End: 2023-12-27 | Stop reason: HOSPADM

## 2023-12-27 RX ORDER — FENTANYL CITRATE 50 UG/ML
25 INJECTION, SOLUTION INTRAMUSCULAR; INTRAVENOUS EVERY 5 MIN PRN
Status: DISCONTINUED | OUTPATIENT
Start: 2023-12-27 | End: 2023-12-27 | Stop reason: HOSPADM

## 2023-12-27 RX ORDER — FENTANYL CITRATE 50 UG/ML
50 INJECTION, SOLUTION INTRAMUSCULAR; INTRAVENOUS EVERY 5 MIN PRN
Status: DISCONTINUED | OUTPATIENT
Start: 2023-12-27 | End: 2023-12-27 | Stop reason: HOSPADM

## 2023-12-27 RX ORDER — HYDROMORPHONE HYDROCHLORIDE 1 MG/ML
0.4 INJECTION, SOLUTION INTRAMUSCULAR; INTRAVENOUS; SUBCUTANEOUS EVERY 5 MIN PRN
Status: DISCONTINUED | OUTPATIENT
Start: 2023-12-27 | End: 2023-12-27 | Stop reason: HOSPADM

## 2023-12-27 RX ORDER — HYDROMORPHONE HYDROCHLORIDE 1 MG/ML
0.2 INJECTION, SOLUTION INTRAMUSCULAR; INTRAVENOUS; SUBCUTANEOUS EVERY 5 MIN PRN
Status: DISCONTINUED | OUTPATIENT
Start: 2023-12-27 | End: 2023-12-27 | Stop reason: HOSPADM

## 2023-12-27 RX ORDER — CEFAZOLIN SODIUM/WATER 2 G/20 ML
2 SYRINGE (ML) INTRAVENOUS
Status: COMPLETED | OUTPATIENT
Start: 2023-12-27 | End: 2023-12-27

## 2023-12-27 RX ORDER — BUPIVACAINE HYDROCHLORIDE 2.5 MG/ML
INJECTION, SOLUTION EPIDURAL; INFILTRATION; INTRACAUDAL
Status: COMPLETED | OUTPATIENT
Start: 2023-12-27 | End: 2023-12-27

## 2023-12-27 RX ORDER — FENTANYL CITRATE 50 UG/ML
25-50 INJECTION, SOLUTION INTRAMUSCULAR; INTRAVENOUS
Status: DISCONTINUED | OUTPATIENT
Start: 2023-12-27 | End: 2023-12-27 | Stop reason: HOSPADM

## 2023-12-27 RX ORDER — HYDROCODONE BITARTRATE AND ACETAMINOPHEN 5; 325 MG/1; MG/1
1-2 TABLET ORAL EVERY 4 HOURS PRN
Qty: 20 TABLET | Refills: 0 | Status: SHIPPED | OUTPATIENT
Start: 2023-12-27

## 2023-12-27 RX ORDER — SODIUM CHLORIDE, SODIUM LACTATE, POTASSIUM CHLORIDE, CALCIUM CHLORIDE 600; 310; 30; 20 MG/100ML; MG/100ML; MG/100ML; MG/100ML
INJECTION, SOLUTION INTRAVENOUS CONTINUOUS
Status: DISCONTINUED | OUTPATIENT
Start: 2023-12-27 | End: 2023-12-27 | Stop reason: HOSPADM

## 2023-12-27 RX ORDER — NALOXONE HYDROCHLORIDE 0.4 MG/ML
0.4 INJECTION, SOLUTION INTRAMUSCULAR; INTRAVENOUS; SUBCUTANEOUS
Status: DISCONTINUED | OUTPATIENT
Start: 2023-12-27 | End: 2023-12-27 | Stop reason: HOSPADM

## 2023-12-27 RX ORDER — ONDANSETRON 4 MG/1
4 TABLET, ORALLY DISINTEGRATING ORAL EVERY 30 MIN PRN
Status: DISCONTINUED | OUTPATIENT
Start: 2023-12-27 | End: 2023-12-27 | Stop reason: HOSPADM

## 2023-12-27 RX ORDER — HYDROXYZINE HYDROCHLORIDE 25 MG/1
25 TABLET, FILM COATED ORAL 3 TIMES DAILY PRN
Qty: 20 TABLET | Refills: 0 | Status: SHIPPED | OUTPATIENT
Start: 2023-12-27 | End: 2024-01-09

## 2023-12-27 RX ADMIN — FENTANYL CITRATE 50 MCG: 50 INJECTION INTRAMUSCULAR; INTRAVENOUS at 07:59

## 2023-12-27 RX ADMIN — ONDANSETRON 4 MG: 2 INJECTION INTRAMUSCULAR; INTRAVENOUS at 09:00

## 2023-12-27 RX ADMIN — DEXAMETHASONE SODIUM PHOSPHATE 4 MG: 4 INJECTION, SOLUTION INTRA-ARTICULAR; INTRALESIONAL; INTRAMUSCULAR; INTRAVENOUS; SOFT TISSUE at 09:00

## 2023-12-27 RX ADMIN — LIDOCAINE HYDROCHLORIDE 100 MG: 20 INJECTION, SOLUTION INFILTRATION; PERINEURAL at 08:27

## 2023-12-27 RX ADMIN — DEXMEDETOMIDINE HYDROCHLORIDE 20 MCG: 4 INJECTION, SOLUTION INTRAVENOUS at 08:00

## 2023-12-27 RX ADMIN — DEXAMETHASONE SODIUM PHOSPHATE 2 MG: 10 INJECTION, SOLUTION INTRAMUSCULAR; INTRAVENOUS at 08:00

## 2023-12-27 RX ADMIN — MIDAZOLAM HYDROCHLORIDE 1 MG: 1 INJECTION, SOLUTION INTRAMUSCULAR; INTRAVENOUS at 07:58

## 2023-12-27 RX ADMIN — BUPIVACAINE HYDROCHLORIDE AND EPINEPHRINE BITARTRATE 10 ML: 2.5; .005 INJECTION, SOLUTION INFILTRATION; PERINEURAL at 08:10

## 2023-12-27 RX ADMIN — Medication 2 G: at 08:32

## 2023-12-27 RX ADMIN — ACETAMINOPHEN 975 MG: 325 TABLET, FILM COATED ORAL at 07:26

## 2023-12-27 RX ADMIN — DEXAMETHASONE SODIUM PHOSPHATE 1 MG: 10 INJECTION, SOLUTION INTRAMUSCULAR; INTRAVENOUS at 08:10

## 2023-12-27 RX ADMIN — BUPIVACAINE HYDROCHLORIDE 20 ML: 2.5 INJECTION, SOLUTION EPIDURAL; INFILTRATION; INTRACAUDAL at 08:00

## 2023-12-27 RX ADMIN — DEXMEDETOMIDINE 20 MCG: 100 INJECTION, SOLUTION, CONCENTRATE INTRAVENOUS at 08:10

## 2023-12-27 RX ADMIN — SODIUM CHLORIDE, POTASSIUM CHLORIDE, SODIUM LACTATE AND CALCIUM CHLORIDE: 600; 310; 30; 20 INJECTION, SOLUTION INTRAVENOUS at 08:17

## 2023-12-27 RX ADMIN — PROPOFOL 320 MG: 10 INJECTION, EMULSION INTRAVENOUS at 08:27

## 2023-12-27 ASSESSMENT — ACTIVITIES OF DAILY LIVING (ADL)
ADLS_ACUITY_SCORE: 31

## 2023-12-27 NOTE — ANESTHESIA POSTPROCEDURE EVALUATION
Patient: Anderson Mitchell    Procedure: Procedure(s):  Subtalar Joint Fusion and Partial Fibula Excision       Anesthesia Type:  General    Note:  Disposition: Outpatient   Postop Pain Control: Uneventful            Sign Out: Well controlled pain   PONV: No   Neuro/Psych: Uneventful            Sign Out: Acceptable/Baseline neuro status   Airway/Respiratory: Uneventful            Sign Out: Acceptable/Baseline resp. status   CV/Hemodynamics: Uneventful            Sign Out: Acceptable CV status; No obvious hypovolemia; No obvious fluid overload   Other NRE:    DID A NON-ROUTINE EVENT OCCUR?        Last vitals:  Vitals Value Taken Time   /79 12/27/23 1015   Temp 35.9  C (96.7  F) 12/27/23 1000   Pulse 43 12/27/23 1015   Resp 10 12/27/23 1015   SpO2 98 % 12/27/23 1015       Electronically Signed By: Luciano Gonzáles MD  December 27, 2023  10:54 AM

## 2023-12-27 NOTE — OR NURSING
Dr. Gonzáles notified patient's HRs ranging from 40-50 in PACU all vitals stable on room air. Patient states has low heart rate baseline, he is a runner. Verbal ok to transfer to phase 2.

## 2023-12-27 NOTE — ANESTHESIA PROCEDURE NOTES
Adductor canal Procedure Note    Pre-Procedure   Staff -        Anesthesiologist:  Syd Lund MD       Resident/Fellow: Anderson Cedeno MD       Performed By: fellow       Location: pre-op       Procedure Start/Stop Times: 12/27/2023 8:10 AM and 12/27/2023 8:15 AM       Pre-Anesthestic Checklist: patient identified, IV checked, site marked, risks and benefits discussed, informed consent, monitors and equipment checked, pre-op evaluation, at physician/surgeon's request and post-op pain management  Timeout:       Correct Patient: Yes        Correct Procedure: Yes        Correct Site: Yes        Correct Position: Yes        Correct Laterality: Yes        Site Marked: Yes  Procedure Documentation  Procedure: Adductor canal       Diagnosis: POST OPERATIVE PAIN CONTROL       Laterality: left       Patient Position: supine       Patient Prep/Sterile Barriers: sterile gloves, mask       Skin prep: Chloraprep       Needle Type: insulated       Needle Gauge: 21.        Needle Length (Inches): 4        Ultrasound guided       1. Ultrasound was used to identify targeted nerve, plexus, vascular marker, or fascial plane and place a needle adjacent to it in real-time.       2. Ultrasound was used to visualize the spread of anesthetic in close proximity to the above referenced structure.       3. A permanent image is entered into the patient's record.    Assessment/Narrative         The placement was negative for: blood aspirated, painful injection and site bleeding       Paresthesias: No.       Bolus given via needle..        Secured via.        Insertion/Infusion Method: Single Shot       Complications: none    Medication(s) Administered   Bupivacaine 0.25% w/ 1:200K Epi (Injection) - Injection   10 mL - 12/27/2023 8:10:00 AM  Dexmedetomidine 4 mcg/mL (Perineural) - Perineural   20 mcg - 12/27/2023 8:10:00 AM  Dexamethasone 10 mg/mL PF (Perineural) - Perineural   1 mg - 12/27/2023 8:10:00 AM  Medication  "Administration Time: 12/27/2023 8:10 AM      FOR [facility] ([facility]) ONLY:   Pain Team Contact information: please page the Pain Team Via [employer]. Search \"Pain\". During daytime hours, please page the attending first. At night please page the resident first.      "

## 2023-12-27 NOTE — OP NOTE
Date of surgery: 12/27/2023      Preoperative diagnosis: Left subtalar joint arthritis.  Left fibula nonunion     Postoperative diagnosis: Left subtalar joint arthritis.  Left fibula nonunion     Procedure: #1.  Left subtalar joint arthrodesis.  #2.  Left fibula partial excision     Surgeons: Ayush Hamilton MD     Assistants: Ceasar Sumner PA-C     Complications: None     Drains: None     EBL: Less than 20 cc     Anesthesia: General endotracheal     Indications: Please refer to clinic note for further details     Procedure note: On 12/27/2023 patient was taken to surgery.  Preoperative antibiotics were administered to the patient prior to arrival to the OR     After successful induction of general endotracheal anesthesia he  was placed supine on the table.  The left lower extremity was prepped and draped in sterile fashion.  After exsanguination by gravity, tourniquet cuff was inflated to 300 mmHg on the proximal third of the left thigh.      The pause for the cause was performed according to our institutional policy which confirmed laterality of the procedure.     An incision was made along along the sinus Tarsi for the left foot.  Subcutaneous's were dissected.  Will proceed dissection more proximal from the sinus Tarsi which allows identify the nonunion for the fibula.  An ossicle measuring approximately 2 x 2 cm was extracted from the most anterior and distal portion of the fibula.  We confirmed absence of any residual bony fragments at the level.    We proceeded then with transection of the interosseous ligament for the subtalar joint we confirmed the presence of an extremely arthritic subtalar joint with basically no cartilage left.  Multiple perforations were created in order to expose a subchondral cancellous bone.    Independent incision will proceed with placement of a screw from posterior and plantar to anterior and dorsal with excellent purchase across the subtalar joint.    We confirmed the fluoroscopic  semination and 3 views of the left ankle to have excellent reduction of the joint and location of the hardware.  These images were sent to PACS for definitive documentation     Tourniquet was deflated.  Satisfactory hemostasis was accomplished.  Wound was closed in layers.  Sterile dressings were applied.  Patient was placed in short leg cast and transferred in stable condition to PACU.      Plan: Patient will remain nonweightbearing x 3 months.  He will return to clinic in 2 weeks for a wound check at that time sutures were removed indicated he will place in a second short leg cast in a neutral alignment.    Patient will return to clinic at 6 weeks from surgery and at that time 2 views of the left calcaneus will be obtained and based on those findings for recommendations will be given to the patient.    Patient will not pursue any physical therapy for the first 6 weeks from surgery.    Ayush Hamilton MD

## 2023-12-27 NOTE — ANESTHESIA PROCEDURE NOTES
Sciatic Procedure Note    Pre-Procedure   Staff -        Anesthesiologist:  Syd Lund MD       Resident/Fellow: Anderson Cedeno MD       Performed By: fellow       Location: pre-op       Procedure Start/Stop Times: 12/27/2023 8:00 AM and 12/27/2023 8:10 AM       Pre-Anesthestic Checklist: patient identified, IV checked, site marked, risks and benefits discussed, informed consent, monitors and equipment checked, pre-op evaluation, at physician/surgeon's request and post-op pain management  Timeout:       Correct Patient: Yes        Correct Procedure: Yes        Correct Site: Yes        Correct Position: Yes        Correct Laterality: Yes        Site Marked: Yes  Procedure Documentation  Procedure: Sciatic       Diagnosis: POST OPERATIVE PAIN CONTROL       Laterality: left       Patient Position: lateral       Patient Prep/Sterile Barriers: sterile gloves, mask       Skin prep: Chloraprep (popliteal approach).       Needle Type: insulated       Needle Gauge: 21.        Needle Length (Inches): 4        Ultrasound guided       1. Ultrasound was used to identify targeted nerve, plexus, vascular marker, or fascial plane and place a needle adjacent to it in real-time.       2. Ultrasound was used to visualize the spread of anesthetic in close proximity to the above referenced structure.       3. A permanent image is entered into the patient's record.    Assessment/Narrative         The placement was negative for: blood aspirated, painful injection and site bleeding       Paresthesias: No.    Medication(s) Administered   Bupivacaine 0.25% PF (Infiltration) - Infiltration   20 mL - 12/27/2023 8:00:00 AM  Dexamethasone 10 mg/mL PF (Perineural) - Perineural   2 mg - 12/27/2023 8:00:00 AM  Dexmedetomidine 4 mcg/mL (Perineural) - Perineural   20 mcg - 12/27/2023 8:00:00 AM  Medication Administration Time: 12/27/2023 8:00 AM      FOR [facility] ([facility]) ONLY:   Pain Team Contact information: please page the  "Pain Team Via [facility]. Search \"Pain\". During daytime hours, please page the attending first. At night please page the resident first.      "

## 2023-12-27 NOTE — BRIEF OP NOTE
[facility] Brief Operative Note    Pre-operative diagnosis: Pain in joint, ankle and foot, left [M25.572]   Post-operative diagnosis Left ankle and foot pain   Procedure: Procedure(s):  Subtalar Joint Fusion and Partial Fibula Excision   Surgeon(s): Surgeon(s) and Role:     * Ayush Hamilton MD - Primary   Estimated blood loss: 20 cc   Specimens: * No specimens in log *   Findings: See post op report     Plan:  Same Day surgery discharge to home once criteria met.  Cast to remain on left  lower extremity and  NWB at all times.  Norco for pain.  No dressing change on own.  Leave dressing on until 2 weeks follow up appointment.  F/U in clinic in 2 weeks    I was asked by Dr. Hamilton to assist with surgery. I positioned and prepped the patient. I retracted soft tissue.   I suctioned fluids when needed. I provided traction for dissection. I helped to ligate blood vessels. I helped Dr. Hamilton identify and protect important structures. The procedure was medically necessary for an assistant because Dr. Hamilton needed the operative exposure and assistance that I provided. This allowed him to safely and efficiently operate. It was also important that I help ligate blood vessels to maintain hemostasis and reduce the bleeding risk. I helped with the closure of the operative incisions as well as helping with the boot/cast/splint.  The assistance that I provided reduced operative time which meant less general anesthetic for the patient. No qualified residents were available to assist.    Anthony Sumner PA-C

## 2023-12-27 NOTE — ANESTHESIA CARE TRANSFER NOTE
Patient: Anderson Mitchell    Procedure: Procedure(s):  Subtalar Joint Fusion and Partial Fibula Excision       Diagnosis: Pain in joint, ankle and foot, left [M25.572]  Diagnosis Additional Information: No value filed.    Anesthesia Type:   General     Note:    Oropharynx: oropharynx clear of all foreign objects and spontaneously breathing  Level of Consciousness: drowsy  Oxygen Supplementation: face mask  Level of Supplemental Oxygen (L/min / FiO2): 4  Independent Airway: airway patency satisfactory and stable  Dentition: dentition unchanged  Vital Signs Stable: post-procedure vital signs reviewed and stable  Report to RN Given: handoff report given  Patient transferred to: PACU    Handoff Report: Identifed the Patient, Identified the Reponsible Provider, Reviewed the pertinent medical history, Discussed the surgical course, Reviewed Intra-OP anesthesia mangement and issues during anesthesia, Set expectations for post-procedure period and Allowed opportunity for questions and acknowledgement of understanding    Vitals:  Vitals Value Taken Time   /78 12/27/23 0925   Temp     Pulse 45 12/27/23 0926   Resp 15 12/27/23 0926   SpO2 100 % 12/27/23 0926   Vitals shown include unfiled device data.    Electronically Signed By: Anderson Lance MD  December 27, 2023  9:27 AM

## 2023-12-27 NOTE — DISCHARGE INSTRUCTIONS
Tylenol last given at 7:26 am           Same-Day Surgery   Adult Discharge Orders & Instructions     For 24 hours after surgery:  Get plenty of rest.  A responsible adult must stay with you for at least 24 hours after you leave the hospital.   Pain medication can slow your reflexes. Do not drive or use heavy equipment.  If you have weakness or tingling, don't drive or use heavy equipment until this feeling goes away.  Mixing alcohol and pain medication can cause dizziness and slow your breathing. It can even be fatal. Do not drink alcohol while taking pain medication.  Avoid strenuous or risky activities.  Ask for help when climbing stairs.   You may feel lightheaded.  If so, sit for a few minutes before standing.  Have someone help you get up.   If you have nausea (feel sick to your stomach), drink only clear liquids such as apple juice, ginger ale, broth or 7-Up.  Rest may also help.  Be sure to drink enough fluids.  Move to a regular diet as you feel able. Take pain medications with a small amount of solid food, such as toast or crackers, to avoid nausea.   A slight fever is normal. Call the doctor if your fever is over 100 F (37.7 C) (taken under the tongue) or lasts longer than 24 hours.  You may have a dry mouth, muscle aches, trouble sleeping or a sore throat.  These symptoms should go away after 24 hours.  Do not make important or legal decisions.   Pain Management:      1. Take pain medication (if prescribed) for pain as directed by your physician.        2. WARNING: If the pain medication you have been prescribed contains Tylenol  (acetaminophen), DO NOT take additional doses of Tylenol (acetaminophen).     Call your doctor for any of the followin.  Signs of infection (fever, growing tenderness at the surgery site, severe pain, a large amount of drainage or bleeding, foul-smelling drainage, redness, swelling).    2.  It has been over 8 to 10 hours since surgery and you are still not able to urinate  (pee).    3.  Headache for over 24 hours.    4.  Numbness, tingling or weakness the day after surgery (if you had spinal anesthesia).  To contact a doctor, call Dr. Hamilton [facility] at [phone]  or:  '   [phone] and ask for the Resident On Call for:          Orthopedics (answered 24 hours a day)  '   Emergency Department:  [facility]: [phone]  [facility]: [phone]               Rev. 10/2014

## 2023-12-27 NOTE — ANESTHESIA PROCEDURE NOTES
Airway       Patient location during procedure: OR  Staff -        Anesthesiologist:  Syd Lund MD       Resident/Fellow: Anderson Lance MD       Performed By: with residents and resident       Procedure performed by resident/fellow/CRNA in presence of a teaching physician.  Indications and Patient Condition       Indications for airway management: sarah-procedural       Induction type:intravenous       Mask difficulty assessment: 0 - not attempted    Final Airway Details       Final airway type: supraglottic airway    Supraglottic Airway Details        Type: LMA       Brand: I-Gel       LMA size: 5    Post intubation assessment        Number of attempts at approach: 1       Number of other approaches attempted: 0       Secured with: pink tape       Ease of procedure: easy       Dentition: Intact

## 2024-01-02 ENCOUNTER — DOCUMENTATION ONLY (OUTPATIENT)
Dept: OTHER | Facility: CLINIC | Age: 64
End: 2024-01-02
Payer: COMMERCIAL

## 2024-01-05 ENCOUNTER — DOCUMENTATION ONLY (OUTPATIENT)
Dept: ORTHOPEDICS | Facility: CLINIC | Age: 64
End: 2024-01-05
Payer: COMMERCIAL

## 2024-01-05 NOTE — PROGRESS NOTES
Received Completed forms Yes   Faxed Forms Faxed To: [facility]  Fax Number: [phone]   Sent to HIM (Date) 1/4/24

## 2024-01-05 NOTE — PROGRESS NOTES
Received Completed forms Yes   Faxed Forms Faxed To: [facility]  Fax Number: [phone]   Sent to HIM (Date) 1/4/24

## 2024-01-09 RX ORDER — HYDROXYZINE HYDROCHLORIDE 25 MG/1
25 TABLET, FILM COATED ORAL 3 TIMES DAILY PRN
Qty: 20 TABLET | Refills: 0 | Status: SHIPPED | OUTPATIENT
Start: 2024-01-09 | End: 2024-01-19

## 2024-01-09 NOTE — TELEPHONE ENCOUNTER
"Call placed to patient. He reports the last three days he has been experiencing sharp \"lightening\" pain in his operative extremity. He has been having trouble falling asleep, but is able to sleep through the night when he does.  He reports adequate pain control with Tylenol and Hydroxyzine. He is requesting a refill of Hydroxyzine be sent to his pharmacy. He will continue to ice and elevate if he has pain/swelling.    He reports his toes on his operative extremity are cold. They feel warmer than his non operative extremity. He reports they are normal body color and he is able to wiggle his toes. He will call with further questions or concerns. Clinic and after hours number provided.    Order for wedge/pillow mailed to address listed in patient's chart.    Tara Holter, RNCC    "

## 2024-01-09 NOTE — TELEPHONE ENCOUNTER
Attempted to call patient. LVM requesting callback. Clinic phone number provided.    Tara Holter, RNCC

## 2024-01-15 ENCOUNTER — OFFICE VISIT (OUTPATIENT)
Dept: ORTHOPEDICS | Facility: CLINIC | Age: 64
End: 2024-01-15
Payer: COMMERCIAL

## 2024-01-15 DIAGNOSIS — Z98.890 STATUS POST SURGERY: Primary | ICD-10-CM

## 2024-01-15 DIAGNOSIS — N28.9 KIDNEY LESION, NATIVE, LEFT: Primary | ICD-10-CM

## 2024-01-15 PROCEDURE — 29405 APPL SHORT LEG CAST: CPT | Mod: 58

## 2024-01-15 NOTE — PROGRESS NOTES
Reason for visit:    Anderson Mitchell came in to the clinic for a two week post op check.    His surgery was done 12/27/2023 by Dr Hamilton.  He had a right subtalar fusion and partial fibula excision.     Assessment:    Anderson came into the clinic in a short leg cast Non-WB using a knee scooter, accompanied by his son.    The Surgical wounds were exposed and found to be well-healed; so the sutures and staples were removed. Skin was c/d/I. Steri strips were applied. Minimal swelling noted. Charan العلي is doing well.    Plan:     He was placed in a new short leg cast.  He was told to remain Non-WB.     He has an appointment to see Dr. Hamilton at 6 weeks post op and at that time Dr. Hamilton will determine further restrictions.    All questions were answered. He has our phone number and will call with additional questions or problems.    Nova Rae PA-C is the overseeing provider on site today.  Cast removal:    Relevant Diagnosis: Right subtalar fusion.    Patient educated on cast removal process: Yes     Short leg cast was removed per physician instruction.    Skin was observed and found to be intact with no signs of concern:Yes     Concern noted: None     Person(s) involved in removal:   Son     Questions asked: None    Patient sent to x-ray: NA  Cast/splint application    Date/Time: 1/15/2024 11:29 AM    Performed by: Nova Chiu ATC  Authorized by: Ayush Hamilton MD    Consent:     Consent obtained:  Verbal    Consent given by:  Patient  Pre-procedure details:     Sensation:  Normal  Procedure details:     Laterality:  Right    Location:  Ankle    Ankle:  R ankle    Cast type:  Short leg    Supplies:  Fiberglass  Post-procedure details:     Pain:  Unchanged    Sensation:  Normal    Patient tolerance of procedure:  Tolerated well, no immediate complications    Patient provided with cast or splint care instructions: Yes

## 2024-01-19 DIAGNOSIS — Z98.890 STATUS POST SURGERY: ICD-10-CM

## 2024-01-19 DIAGNOSIS — M25.572 LEFT ANKLE PAIN, UNSPECIFIED CHRONICITY: ICD-10-CM

## 2024-01-19 RX ORDER — HYDROXYZINE HYDROCHLORIDE 25 MG/1
25 TABLET, FILM COATED ORAL 3 TIMES DAILY PRN
Qty: 20 TABLET | Refills: 0 | Status: SHIPPED | OUTPATIENT
Start: 2024-01-19

## 2024-01-22 ENCOUNTER — MYC MEDICAL ADVICE (OUTPATIENT)
Dept: ONCOLOGY | Facility: CLINIC | Age: 64
End: 2024-01-22

## 2024-01-22 ENCOUNTER — OFFICE VISIT (OUTPATIENT)
Dept: ORTHOPEDICS | Facility: CLINIC | Age: 64
End: 2024-01-22
Payer: COMMERCIAL

## 2024-01-22 ENCOUNTER — ANCILLARY PROCEDURE (OUTPATIENT)
Dept: ULTRASOUND IMAGING | Facility: CLINIC | Age: 64
End: 2024-01-22
Attending: PHYSICIAN ASSISTANT
Payer: COMMERCIAL

## 2024-01-22 DIAGNOSIS — Z98.890 STATUS POST SURGERY: ICD-10-CM

## 2024-01-22 DIAGNOSIS — R09.89 SUSPECTED DEEP VEIN THROMBOSIS (DVT): ICD-10-CM

## 2024-01-22 DIAGNOSIS — R09.89 SUSPECTED DEEP VEIN THROMBOSIS (DVT): Primary | ICD-10-CM

## 2024-01-22 PROCEDURE — 93971 EXTREMITY STUDY: CPT | Mod: LT | Performed by: RADIOLOGY

## 2024-01-22 PROCEDURE — 99207 PR NO CHARGE NURSE ONLY: CPT

## 2024-01-22 NOTE — PROGRESS NOTES
Cast removal:    Relevant Diagnosis: Left subtalar ankle joint fusion    Patient educated on cast removal process: Yes     Short leg cast was removed per physician instruction.    Skin was observed and found to be intact with no signs of concern:Yes     Concern noted: Increased lower leg pain and numbness     Person(s) involved in removal:   Patient     Questions asked: Description of symptoms. Numbness and coldness felt in toes by patient. Anterior sharp ankle pain.    Patient sent to x-ray: No, explain: sent to US to rule out DVT.     Placed in CAM boot, approved by Dr Hamilton. Will contact patient with US results.    Faisal Rae PA-C is the overseeing provider on site.    DME FITTING    Relevant Diagnosis: Left subtalar joint ankle fusion  CAM boot was fit on patient's Left ankle.     Person(s) involved in teaching:   Patient    Brace was applied in standard Manner:  Yes  Brace fit well:  Yes  Patient reports brace to fit comfortably:  Yes    Education:   Patient shown self application and removal of brace: Yes  Patient shown how to adjust brace fit, if necessary: Yes  Patient educated on billing and return policy: Yes  Patient confirmed understanding when and how to contact clinic with concerns: Yes    Nova Chiu ATC

## 2024-01-22 NOTE — NURSING NOTE
- Talked with patient. Has neuropathy at baseline but has worsened dramatically. Having new calf pain dull and constant and sharp lower calf pain and around his ankle. Will get US to rule out DVT.

## 2024-01-27 ENCOUNTER — MYC MEDICAL ADVICE (OUTPATIENT)
Dept: ORTHOPEDICS | Facility: CLINIC | Age: 64
End: 2024-01-27
Payer: COMMERCIAL

## 2024-01-27 DIAGNOSIS — M25.572 PAIN IN JOINT, ANKLE AND FOOT, LEFT: Primary | ICD-10-CM

## 2024-01-31 RX ORDER — HYDROXYZINE HYDROCHLORIDE 25 MG/1
25 TABLET, FILM COATED ORAL EVERY 8 HOURS PRN
Qty: 20 TABLET | Refills: 0 | Status: SHIPPED | OUTPATIENT
Start: 2024-01-31

## 2024-01-31 NOTE — TELEPHONE ENCOUNTER
Call placed to patient. He has been having trouble falling asleep. When he does fall asleep he does not wake up for several hours. He does not have a history of insomnia. He believes he is having trouble falling asleep due to his schedule not being consistent. The pain is not keeping or waking him up at night. He is taking Hydroxyzine 2-3x a day and reports it improves the numbness/tingling he has in his foot. He is requesting a refill be sent to [facility] pharmacy. He has tried Tylenol PM to help him sleep but has not tried Melatonin. Informed him I would discuss with Dr. Hamilton about prescribing Gabapentin for nerve pain but not to help him sleep. Encouraged him to try to keep a consistent sleep/wake cycle and reach out to his PCP if OTC medications are not ineffective. He verbalized understanding and will call with concerns or if the pain is preventing him from sleeping.     Tara Holter, RNCC

## 2024-02-06 ENCOUNTER — ANCILLARY PROCEDURE (OUTPATIENT)
Dept: GENERAL RADIOLOGY | Facility: CLINIC | Age: 64
End: 2024-02-06
Attending: ORTHOPAEDIC SURGERY
Payer: COMMERCIAL

## 2024-02-06 ENCOUNTER — OFFICE VISIT (OUTPATIENT)
Dept: ORTHOPEDICS | Facility: CLINIC | Age: 64
End: 2024-02-06
Payer: COMMERCIAL

## 2024-02-06 DIAGNOSIS — Z98.890 STATUS POST SURGERY: Primary | ICD-10-CM

## 2024-02-06 DIAGNOSIS — Z98.890 STATUS POST SURGERY: ICD-10-CM

## 2024-02-06 DIAGNOSIS — M25.572 PAIN IN JOINT, ANKLE AND FOOT, LEFT: ICD-10-CM

## 2024-02-06 PROCEDURE — 73650 X-RAY EXAM OF HEEL: CPT | Mod: LT | Performed by: RADIOLOGY

## 2024-02-06 PROCEDURE — 99024 POSTOP FOLLOW-UP VISIT: CPT | Performed by: ORTHOPAEDIC SURGERY

## 2024-02-06 NOTE — NURSING NOTE
Reason For Visit:   Chief Complaint   Patient presents with    RECHECK     6 week POP ST joint fusion & fibula partial excision DOS 12/27/23   Talk to Jocelynn before seeing him       There were no vitals taken for this visit.         Nova Chiu, ATC

## 2024-02-06 NOTE — PROGRESS NOTES
Chief complaint: Left subtalar joint effusion with partial fibular excision performed December 27, 2023    Today patient presents today for further follow-up.  Reports to be doing well.  He is struggling with for getting some sleep at night with which does not seem to be pain related.  He reports to be compliant.    There is well-healed surgical incisions on today's exam there is full range of motion of the ankle.  There is minimal swelling.    Plan x-rays of the left foot were reviewed today which are significant for showing partial consolidation across the fusion site.  Hardware is intact and in place alignment is excellent.    Assessment: Status post left subtalar joint fusion    Plan: Discussed with the patient to remain nonweightbearing for another month.  Cam walker will utilize when up and about.    In a month from now he will proceed with a CT scan of the left foot prior to the appointment therefore he will not require plain x-rays.    All questions were answered.

## 2024-02-06 NOTE — LETTER
2/6/2024         RE: Anderson Mitchell  [address]  [place] [address]        Dear Colleague,    Thank you for referring your patient, Anderson Mitchell, to the [facility] [place]. Please see a copy of my visit note below.    Chief complaint: Left subtalar joint effusion with partial fibular excision performed December 27, 2023    Today patient presents today for further follow-up.  Reports to be doing well.  He is struggling with for getting some sleep at night with which does not seem to be pain related.  He reports to be compliant.    There is well-healed surgical incisions on today's exam there is full range of motion of the ankle.  There is minimal swelling.    Plan x-rays of the left foot were reviewed today which are significant for showing partial consolidation across the fusion site.  Hardware is intact and in place alignment is excellent.    Assessment: Status post left subtalar joint fusion    Plan: Discussed with the patient to remain nonweightbearing for another month.  Cam walker will utilize when up and about.    In a month from now he will proceed with a CT scan of the left foot prior to the appointment therefore he will not require plain x-rays.    All questions were answered.          Ayush Hamilton MD

## 2024-02-15 ENCOUNTER — DOCUMENTATION ONLY (OUTPATIENT)
Dept: ORTHOPEDICS | Facility: CLINIC | Age: 64
End: 2024-02-15
Payer: COMMERCIAL

## 2024-02-15 NOTE — PROGRESS NOTES
Received Completed forms Yes   Faxed Forms Faxed To: chet  Fax Number: [phone]   Sent to HIM (Date) 2/14/24

## 2024-02-21 ENCOUNTER — TELEPHONE (OUTPATIENT)
Dept: ORTHOPEDICS | Facility: CLINIC | Age: 64
End: 2024-02-21
Payer: COMMERCIAL

## 2024-02-21 NOTE — CONFIDENTIAL NOTE
"Patient sent a [employer] requesting a refill of Hydroxyzine. Call placed to patient. He reports to be in \"constant unrelenting pain\" and sleeping 3 hours a night. He describes the pain as shooting from his toes, into his foot, and occasionally up into his calf. He denies any redness or warmth in his calf. He does report his toes on the operative extremity feel colder than his opposite foot. He has swelling in his toes, foot and calf that improves with icing and elevation. He is taking Gabapentin 300 mg BID. He ran out of Hydroxyzine a week ago.    I recommended, as we previously discussed, he be evaluated in clinic. I offered to ask if one of our PA's could see him in clinic as Dr. Hamilton only has clinic on Tuesdays and he is out the next two weeks. He would prefer to see his PCP Dr. Resendiz at [facility]. Since Dr. Resendiz prescribed his Gabapentin for his foot and he is not follow up with us in clinic, he will ask Dr. Resendiz to refill his Hydroxyzine. He will update us via [facility] when he schedules an appointment. Message sent to provider.    Tara Holter, LAUROCC    "

## 2024-03-12 ENCOUNTER — OFFICE VISIT (OUTPATIENT)
Dept: ORTHOPEDICS | Facility: CLINIC | Age: 64
End: 2024-03-12
Payer: COMMERCIAL

## 2024-03-12 ENCOUNTER — ANCILLARY PROCEDURE (OUTPATIENT)
Dept: CT IMAGING | Facility: CLINIC | Age: 64
End: 2024-03-12
Attending: ORTHOPAEDIC SURGERY
Payer: COMMERCIAL

## 2024-03-12 DIAGNOSIS — Z98.890 STATUS POST SURGERY: ICD-10-CM

## 2024-03-12 DIAGNOSIS — Z98.890 STATUS POST SURGERY: Primary | ICD-10-CM

## 2024-03-12 DIAGNOSIS — M25.572 PAIN IN JOINT, ANKLE AND FOOT, LEFT: ICD-10-CM

## 2024-03-12 PROCEDURE — 73700 CT LOWER EXTREMITY W/O DYE: CPT | Mod: LT | Performed by: RADIOLOGY

## 2024-03-12 PROCEDURE — 99024 POSTOP FOLLOW-UP VISIT: CPT | Performed by: ORTHOPAEDIC SURGERY

## 2024-03-12 NOTE — LETTER
3/12/2024         RE: Anderson Mitchell  [address]  [place] [address]        Dear Colleague,    Thank you for referring your patient, Anderson Mitchell, to the [facility] [place]. Please see a copy of my visit note below.    Chief complaint: Status post left subtalar joint fusion with partial fibular excision performed on December 27, 2023    Patient presents today for further follow-up.  Continues having quite a bit of pain and discomfort.  Reports to be unable to get any significant sleep as well as to have quite difficulties with any degree of pressure through the left lower leg and foot.  The discomfort is extending through the posterior medial and lateral aspects of the leg.    On today's exam he presents with quite a bit of pain to the touch presents with a colder extremity than the other 1.  Range of motion was not tested secondary to pain.  There is no increased perspiration.    A CT scan of the left foot was obtained today which significant for showing partial consolidation across the fusion site.  Hardware is intact and in place.    Assessment: Status post left subtalar joint arthrodesis with satisfactory fusion.  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  He will proceed with physical therapy no longer for exercises of the ankle but also for desensitization program.    Patient will be scheduled to be evaluated by our pain experts to see if he would benefit from undergoing a block of the lumbar sympathetic plexus.  I highly suspect that he presents with a picture of CRPS which is the source of his discomfort through the most of the recovery.    Clearly patient is not still ready to return to work and we will have to extend his disability..    Patient will be reevaluated in a month from now and at that time 2 views of  the left calcaneus will be obtained.    All questions were answered.          Ayush Hamilton MD

## 2024-03-12 NOTE — PROGRESS NOTES
Chief complaint: Status post left subtalar joint fusion with partial fibular excision performed on December 27, 2023    Patient presents today for further follow-up.  Continues having quite a bit of pain and discomfort.  Reports to be unable to get any significant sleep as well as to have quite difficulties with any degree of pressure through the left lower leg and foot.  The discomfort is extending through the posterior medial and lateral aspects of the leg.    On today's exam he presents with quite a bit of pain to the touch presents with a colder extremity than the other 1.  Range of motion was not tested secondary to pain.  There is no increased perspiration.    A CT scan of the left foot was obtained today which significant for showing partial consolidation across the fusion site.  Hardware is intact and in place.    Assessment: Status post left subtalar joint arthrodesis with satisfactory fusion.  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  He will proceed with physical therapy no longer for exercises of the ankle but also for desensitization program.    Patient will be scheduled to be evaluated by our pain experts to see if he would benefit from undergoing a block of the lumbar sympathetic plexus.  I highly suspect that he presents with a picture of CRPS which is the source of his discomfort through the most of the recovery.    Clearly patient is not still ready to return to work and we will have to extend his disability..    Patient will be reevaluated in a month from now and at that time 2 views of the left calcaneus will be obtained.    All questions were answered.

## 2024-03-12 NOTE — NURSING NOTE
Reason For Visit:   Chief Complaint   Patient presents with    RECHECK     6 week POP ST joint fusion & fibula partial excision DOS 12/27/23 Same day CT    Presents one week early due to continued uncontrolled pain and swelling. Several lengthy MyChart messages back and forth almost daily.       There were no vitals taken for this visit.         Nova Chiu, ATC

## 2024-03-13 ASSESSMENT — ACTIVITIES OF DAILY LIVING (ADL)
WALKING_FOR_APPROXIMATELY_10_MINUTES: EXTREME DIFFICULTY
SPORTS_COUNT: 9
WEAKNESS: THE SYMPTOM AFFECTS MY ACTIVITY MODERATELY
STANDING FOR 15 MINUTES: EXTREME DIFFICULTY
KNEE_ACTIVITY_OF_DAILY_LIVING_SCORE: 37.14
WALK: ACTIVITY IS FAIRLY DIFFICULT
ANY_OF_YOUR_USUAL_WORK,_HOUSEWORK_OR_SCHOOL_ACTIVITIES: QUITE A BIT OF DIFFICULTY
ABILITY_TO_PERFORM_ACTIVITY_WITH_YOUR_NORMAL_TECHNIQUE: MODERATE DIFFICULTY
GETTING_INTO_AND_OUT_OF_AN_AVERAGE_CAR: MODERATE DIFFICULTY
WALKING_INITIALLY: EXTREME DIFFICULTY
STAND: ACTIVITY IS FAIRLY DIFFICULT
ROLLING OVER IN BED: SLIGHT DIFFICULTY
DEEP SQUATTING: EXTREME DIFFICULTY
HOW_WOULD_YOU_RATE_YOUR_CURRENT_LEVEL_OF_FUNCTION_DURING_YOUR_USUAL_ACTIVITIES_OF_DAILY_LIVING_FROM_0_TO_100_WITH_100_BEING_YOUR_LEVEL_OF_FUNCTION_PRIOR_TO_YOUR_HIP_PROBLEM_AND_0_BEING_THE_INABILITY_TO_PERFORM_ANY_OF_YOUR_USUAL_DAILY_ACTIVITIES?: 60
PAIN: THE SYMPTOM AFFECTS MY ACTIVITY MODERATELY
LIMPING: THE SYMPTOM AFFECTS MY ACTIVITY MODERATELY
GO DOWN STAIRS: ACTIVITY IS FAIRLY DIFFICULT
STARTING_AND_STOPPING_QUICKLY: EXTREME DIFFICULTY
HOW_WOULD_YOU_RATE_THE_OVERALL_FUNCTION_OF_YOUR_KNEE_DURING_YOUR_USUAL_DAILY_ACTIVITIES?: ABNORMAL
HOW_WOULD_YOU_RATE_THE_CURRENT_FUNCTION_OF_YOUR_KNEE_DURING_YOUR_USUAL_DAILY_ACTIVITIES_ON_A_SCALE_FROM_0_TO_100_WITH_100_BEING_YOUR_LEVEL_OF_KNEE_FUNCTION_PRIOR_TO_YOUR_INJURY_AND_0_BEING_THE_INABILITY_TO_PERFORM_ANY_OF_YOUR_USUAL_DAILY_ACTIVITIES?: 55
WALKING_BETWEEN_ROOMS: QUITE A BIT OF DIFFICULTY
WALKING_INITIALLY: EXTREME DIFFICULTY
HOW_WOULD_YOU_RATE_YOUR_CURRENT_LEVEL_OF_FUNCTION_DURING_YOUR_USUAL_ACTIVITIES_OF_DAILY_LIVING_FROM_0_TO_100_WITH_100_BEING_YOUR_LEVEL_OF_FUNCTION_PRIOR_TO_YOUR_HIP_PROBLEM_AND_0_BEING_THE_INABILITY_TO_PERFORM_ANY_OF_YOUR_USUAL_DAILY_ACTIVITIES?: 60
LIGHT_TO_MODERATE_WORK: MODERATE DIFFICULTY
WALKING_UP_STEEP_HILLS: EXTREME DIFFICULTY
HEAVY_WORK: EXTREME DIFFICULTY
SIT WITH YOUR KNEE BENT: ACTIVITY IS VERY DIFFICULT
ADL_HIGHEST_POTENTIAL_SCORE: 68
SQUAT: ACTIVITY IS VERY DIFFICULT
STANDING_FOR_15_MINUTES: EXTREME DIFFICULTY
HEAVY_WORK: EXTREME DIFFICULTY
SPORTS_HIGHEST_POTENTIAL_SCORE: 36
GOING DOWN 1 FLIGHT OF STAIRS: MODERATE DIFFICULTY
PUTTING ON SOCKS AND SHOES: MODERATE DIFFICULTY
AS_A_RESULT_OF_YOUR_KNEE_INJURY,_HOW_WOULD_YOU_RATE_YOUR_CURRENT_LEVEL_OF_DAILY_ACTIVITY?: ABNORMAL
WALKING_A_MILE: EXTREME DIFFICULTY OR UNABLE TO PERFORM ACTIVITY
GO DOWN STAIRS: ACTIVITY IS FAIRLY DIFFICULT
GOING_UP_1_FLIGHT_OF_STAIRS: MODERATE DIFFICULTY
ABILITY_TO_PARTICIPATE_IN_YOUR_DESIRED_SPORT_AS_LONG_AS_YOU_WOULD_LIKE: UNABLE TO DO
WALKING_15_MINUTES_OR_GREATER: EXTREME DIFFICULTY
TWISTING/PIVOTING_ON_INVOLVED_LEG: SLIGHT DIFFICULTY
RISE FROM A CHAIR: ACTIVITY IS SOMEWHAT DIFFICULT
ADL_TOTAL_ITEM_SCORE: 0
GETTING_INTO_OR_OUT_OF_A_CAR: MODERATE DIFFICULTY
CUTTING/LATERAL_MOVEMENTS: EXTREME DIFFICULTY
SQUATTING: MODERATE DIFFICULTY
HOS_ADL_ITEM_SCORE_TOTAL: 23
ROLLING_OVER_IN_BED: SLIGHT DIFFICULTY
PAIN: THE SYMPTOM AFFECTS MY ACTIVITY MODERATELY
YOUR_USUAL_HOBBIES,_RECREATIONAL_OR_SPORTING_ACTIVITIES: EXTREME DIFFICULTY OR UNABLE TO PERFORM ACTIVITY
SITTING_FOR_15_MINUTES: SLIGHT DIFFICULTY
LANDING: EXTREME DIFFICULTY
SITTING FOR 15 MINUTES: SLIGHT DIFFICULTY
SIT WITH YOUR KNEE BENT: ACTIVITY IS VERY DIFFICULT
AS_A_RESULT_OF_YOUR_KNEE_INJURY,_HOW_WOULD_YOU_RATE_YOUR_CURRENT_LEVEL_OF_DAILY_ACTIVITY?: ABNORMAL
SPORTS_TOTAL_ITEM_SCORE: 0
LOW_IMPACT_ACTIVITIES_LIKE_FAST_WALKING: EXTREME DIFFICULTY
WALK: ACTIVITY IS FAIRLY DIFFICULT
PLEASE_INDICATE_YOR_PRIMARY_REASON_FOR_REFERRAL_TO_THERAPY:: KNEE
GO UP STAIRS: ACTIVITY IS FAIRLY DIFFICULT
GO UP STAIRS: ACTIVITY IS FAIRLY DIFFICULT
KNEEL ON THE FRONT OF YOUR KNEE: ACTIVITY IS FAIRLY DIFFICULT
GIVING WAY, BUCKLING OR SHIFTING OF KNEE: THE SYMPTOM AFFECTS MY ACTIVITY MODERATELY
GETTING_INTO_AND_OUT_OF_A_BATH: QUITE A BIT OF DIFFICULTY
SHOPPING: QUITE A BIT OF DIFFICULTY
WALKING_APPROXIMATELY_10_MINUTES: EXTREME DIFFICULTY
WALKING_DOWN_STEEP_HILLS: EXTREME DIFFICULTY
STANDING_FOR_1_HOUR: EXTREME DIFFICULTY OR UNABLE TO PERFORM ACTIVITY
HOS_ADL_SCORE(%): 38.33
PERFORMING_LIGHT_ACTIVITIES_AROUND_YOUR_HOME: QUITE A BIT OF DIFFICULTY
LEFS_RAW_SCORE: 0
RUNNING_ONE_MILE: UNABLE TO DO
STAND: ACTIVITY IS FAIRLY DIFFICULT
WALKING_UP_STEEP_HILLS: EXTREME DIFFICULTY
MAKING_SHARP_TURNS_WHILE_RUNNING_FAST: EXTREME DIFFICULTY OR UNABLE TO PERFORM ACTIVITY
HOW_WOULD_YOU_RATE_THE_CURRENT_FUNCTION_OF_YOUR_KNEE_DURING_YOUR_USUAL_DAILY_ACTIVITIES_ON_A_SCALE_FROM_0_TO_100_WITH_100_BEING_YOUR_LEVEL_OF_KNEE_FUNCTION_PRIOR_TO_YOUR_INJURY_AND_0_BEING_THE_INABILITY_TO_PERFORM_ANY_OF_YOUR_USUAL_DAILY_ACTIVITIES?: 55
GOING_UP_OR_DOWN_10_STAIRS: EXTREME DIFFICULTY OR UNABLE TO PERFORM ACTIVITY
STIFFNESS: THE SYMPTOM AFFECTS MY ACTIVITY SEVERELY
RISE FROM A CHAIR: ACTIVITY IS SOMEWHAT DIFFICULT
WEAKNESS: THE SYMPTOM AFFECTS MY ACTIVITY MODERATELY
PERFORMING_HEAVY_ACTIVITIES_AROUND_YOUR_HOME: EXTREME DIFFICULTY OR UNABLE TO PERFORM ACTIVITY
PUTTING_ON_YOUR_SHOES_OR_SOCKS: EXTREME DIFFICULTY OR UNABLE TO PERFORM ACTIVITY
ADL_SCORE(%): 0
STIFFNESS: THE SYMPTOM AFFECTS MY ACTIVITY SEVERELY
RUNNING_ON_UNEVEN_GROUND: EXTREME DIFFICULTY OR UNABLE TO PERFORM ACTIVITY
RUNNING_ON_EVEN_GROUND: EXTREME DIFFICULTY OR UNABLE TO PERFORM ACTIVITY
STEPPING UP AND DOWN CURBS: EXTREME DIFFICULTY
LIMPING: THE SYMPTOM AFFECTS MY ACTIVITY MODERATELY
PLEASE_INDICATE_YOR_PRIMARY_REASON_FOR_REFERRAL_TO_THERAPY:: FOOT AND/OR ANKLE
GIVING WAY, BUCKLING OR SHIFTING OF KNEE: THE SYMPTOM AFFECTS MY ACTIVITY MODERATELY
SWELLING: THE SYMPTOM AFFECTS MY ACTIVITY MODERATELY
HOS_ADL_HIGHEST_POTENTIAL_SCORE: 60
GETTING INTO AND OUT OF AN AVERAGE CAR: MODERATE DIFFICULTY
SPORTS_SCORE(%): 0
TWISTING/PIVOTING ON INVOLVED LEG: SLIGHT DIFFICULTY
HOW_WOULD_YOU_RATE_YOUR_CURRENT_LEVEL_OF_FUNCTION_DURING_YOUR_SPORTS_RELATED_ACTIVITIES_FROM_0_TO_100_WITH_100_BEING_YOUR_LEVEL_OF_FUNCTION_PRIOR_TO_YOUR_HIP_PROBLEM_AND_0_BEING_THE_INABILITY_TO_PERFORM_ANY_OF_YOUR_USUAL_DAILY_ACTIVITIES?: 50
WALKING_2_BLOCKS: QUITE A BIT OF DIFFICULTY
SITTING_FOR_1_HOUR: MODERATE DIFFICULTY
ADL_COUNT: 17
LIGHT_TO_MODERATE_WORK: MODERATE DIFFICULTY
WALKING_DOWN_STEEP_HILLS: EXTREME DIFFICULTY
HOW_WOULD_YOU_RATE_THE_OVERALL_FUNCTION_OF_YOUR_KNEE_DURING_YOUR_USUAL_DAILY_ACTIVITIES?: ABNORMAL
LIFTING_AN_OBJECT,_LIKE_A_BAG_OF_GROCERIES_FROM_THE_FLOOR: QUITE A BIT OF DIFFICULTY
WALKING_15_MINUTES_OR_GREATER: EXTREME DIFFICULTY
DEEP_SQUATTING: EXTREME DIFFICULTY
GOING_DOWN_1_FLIGHT_OF_STAIRS: MODERATE DIFFICULTY
SWELLING: THE SYMPTOM AFFECTS MY ACTIVITY MODERATELY
LEFS_SCORE(%): 0
STEPPING_UP_AND_DOWN_CURBS: EXTREME DIFFICULTY
JUMPING: UNABLE TO DO
ROLLING_OVER_IN_BED: MODERATE DIFFICULTY
GOING UP 1 FLIGHT OF STAIRS: MODERATE DIFFICULTY
KNEE_ACTIVITY_OF_DAILY_LIVING_SUM: 26
HOW_WOULD_YOU_RATE_YOUR_CURRENT_LEVEL_OF_FUNCTION?: NEARLY NORMAL
RAW_SCORE: 26
PUTTING_ON_SOCKS_AND_SHOES: MODERATE DIFFICULTY
SQUAT: ACTIVITY IS VERY DIFFICULT
KNEEL ON THE FRONT OF YOUR KNEE: ACTIVITY IS FAIRLY DIFFICULT
PLEASE_INDICATE_YOR_PRIMARY_REASON_FOR_REFERRAL_TO_THERAPY:: HIP

## 2024-03-14 ENCOUNTER — ANCILLARY PROCEDURE (OUTPATIENT)
Dept: MRI IMAGING | Facility: CLINIC | Age: 64
End: 2024-03-14
Attending: PHYSICIAN ASSISTANT
Payer: COMMERCIAL

## 2024-03-14 ENCOUNTER — THERAPY VISIT (OUTPATIENT)
Dept: PHYSICAL THERAPY | Facility: CLINIC | Age: 64
End: 2024-03-14
Attending: ORTHOPAEDIC SURGERY
Payer: COMMERCIAL

## 2024-03-14 DIAGNOSIS — Z98.890 STATUS POST SURGERY: ICD-10-CM

## 2024-03-14 DIAGNOSIS — N28.9 KIDNEY LESION, NATIVE, LEFT: ICD-10-CM

## 2024-03-14 DIAGNOSIS — M25.572 PAIN IN JOINT, ANKLE AND FOOT, LEFT: ICD-10-CM

## 2024-03-14 PROCEDURE — 97110 THERAPEUTIC EXERCISES: CPT | Mod: GP

## 2024-03-14 PROCEDURE — 97161 PT EVAL LOW COMPLEX 20 MIN: CPT | Mod: GP

## 2024-03-14 PROCEDURE — 74183 MRI ABD W/O CNTR FLWD CNTR: CPT

## 2024-03-14 PROCEDURE — 255N000002 HC RX 255 OP 636: Performed by: PHYSICIAN ASSISTANT

## 2024-03-14 PROCEDURE — A9585 GADOBUTROL INJECTION: HCPCS | Performed by: PHYSICIAN ASSISTANT

## 2024-03-14 PROCEDURE — 97140 MANUAL THERAPY 1/> REGIONS: CPT | Mod: GP

## 2024-03-14 RX ORDER — GADOBUTROL 604.72 MG/ML
10 INJECTION INTRAVENOUS ONCE
Status: COMPLETED | OUTPATIENT
Start: 2024-03-14 | End: 2024-03-14

## 2024-03-14 RX ADMIN — GADOBUTROL 10 ML: 604.72 INJECTION INTRAVENOUS at 11:06

## 2024-03-14 NOTE — PROGRESS NOTES
PHYSICAL THERAPY EVALUATION  Type of Visit: Evaluation    See electronic medical record for Abuse and Falls Screening details.    Subjective        Presenting condition or subjective complaint: Post Surgery, fusion of the Ankle  Surgery 12/27/23 and removed bone spurs and cleaned up the arthritis.  Previously was having pain in foot with touching hip  Date of onset: 12/27/23    Relevant medical history: Arthritis; Cancer; Pain at night or rest; Sleep disorder like apnea   Dates & types of surgery: Dec. 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    Pain assessment: Pain present     Objective   FOOT/ANKLE EVALUATION  PAIN: Pain Level at Rest: 4/10  Pain Level with Use: 8/10  Pain Location: ankle and L side  Pain Quality: Aching, Burning, Dull, Numb, Sharp, Shooting, Stabbing, Throbbing, Tingling, and cold sensation  Pain Frequency: constant  Pain is Worst: daytime or nighttime  Pain is Exacerbated By: walking, pressure, touching shin  Pain is Relieved By: laying on back with leg crossed over the other knee  Pain Progression: Worsened  INTEGUMENTARY (edema, incisions):  significant swelling in L foot, ankle, and calf, scar on lateral ankle and post heel   POSTURE: Sitting Posture: Rounded shoulders, Forward head, Lordosis decreased  GAIT:    Weightbearing Status: WBAT  Assistive Device(s): Crutches (bilateral)  Gait Deviations:  use of crutches, painful  BALANCE/PROPRIOCEPTION:  unable to perform SLS on L leg  ROM:  significantly restricted ankle mobility in all directions   STRENGTH:  L: 1/5 PF, 0/5 DF, 0/5 inversion, 0/5 eversion   FLEXIBILITY:  limited gastroc and soleus  PALPATION:  pain with palpation over entire foot, ankle, and some pain over calf  JOINT MOBILITY:  significantly decreased mobility in all planes at all joints    Assessment & Plan   CLINICAL IMPRESSIONS  Medical Diagnosis: L ankle pain s/p ankle surgery    Treatment Diagnosis: L ankle pain s/p ankle surgery   Impression/Assessment: Patient is a 63 year old male with L foot/ankle/calf pain s/p subtalar fusion on 12/27/23 complaints.  The following significant findings have been identified: Pain, Decreased ROM/flexibility, Decreased joint mobility, Decreased strength, Impaired balance, Impaired sensation, Edema, Impaired gait, Impaired muscle performance, and Decreased activity tolerance. These impairments interfere with their ability to perform self care tasks, work tasks, recreational activities, household chores, household mobility, and community mobility as compared to previous level of function.     Clinical Decision Making (Complexity):  Clinical Presentation: Stable/Uncomplicated  Clinical Presentation Rationale: based on medical and personal factors listed in PT evaluation  Clinical Decision Making (Complexity): Low complexity    PLAN OF CARE  Treatment Interventions:  Modalities: Cryotherapy, Dry Needling, E-stim, Hot Pack, Ultrasound  Interventions: Gait Training, Manual Therapy, Neuromuscular Re-education, Therapeutic Activity, Therapeutic Exercise, Self-Care/Home Management, Aquatic Therapy    Long Term Goals     PT Goal 1  Goal Identifier: muscle activation  Goal Description: pt will be able to actively move ankle in all 4 directions - DF, PF, inversion,  eversion  Rationale: to maximize safety and independence with performance of ADLs and functional tasks;to maximize safety and independence with self cares;to maximize safety and independence within the home;to maximize safety and independence within the community;to maximize safety and independence with transportation  Target Date: 06/06/24  PT Goal 2  Goal Identifier: ROM  Goal Description: pt will have PROM WFL in order to be able to perform ADLs and reduce stiffness and pain  Rationale: to maximize safety and independence with performance of ADLs and functional tasks;to maximize safety and independence within the home;to maximize safety and independence within the community;to maximize safety and independence with transportation;to maximize safety and independence with self cares  Target Date: 06/06/24  PT Goal 3  Goal Identifier: ambulation  Goal Description: pt will be able to ambulate 15 minutes with <2/10 pain and LRAD  Rationale: to maximize safety and independence within the community;to maximize safety and independence with performance of ADLs and functional tasks;to maximize safety and independence within the home;to maximize safety and independence with self cares  Target Date: 06/06/24      Frequency of Treatment: 2x/week for 6 weeks then 1x/week for 6 weeks  Duration of Treatment: 12 weeks    Recommended Referrals to Other Professionals: Physical Therapy  Education Assessment:   Learner/Method: Patient    Risks and benefits of evaluation/treatment have been explained.   Patient/Family/caregiver agrees with Plan of Care.     Evaluation Time:     PT Eval, Low Complexity Minutes (80575): 20       Signing Clinician: Janette Hayes PT

## 2024-03-19 ENCOUNTER — THERAPY VISIT (OUTPATIENT)
Dept: PHYSICAL THERAPY | Facility: CLINIC | Age: 64
End: 2024-03-19
Payer: COMMERCIAL

## 2024-03-19 DIAGNOSIS — M25.572 PAIN IN JOINT, ANKLE AND FOOT, LEFT: ICD-10-CM

## 2024-03-19 DIAGNOSIS — Z98.890 STATUS POST SURGERY: Primary | ICD-10-CM

## 2024-03-19 PROCEDURE — 97110 THERAPEUTIC EXERCISES: CPT | Mod: GP

## 2024-03-19 PROCEDURE — 97535 SELF CARE MNGMENT TRAINING: CPT | Mod: GP

## 2024-03-19 PROCEDURE — 97140 MANUAL THERAPY 1/> REGIONS: CPT | Mod: GP

## 2024-03-22 ENCOUNTER — THERAPY VISIT (OUTPATIENT)
Dept: PHYSICAL THERAPY | Facility: CLINIC | Age: 64
End: 2024-03-22
Payer: COMMERCIAL

## 2024-03-22 DIAGNOSIS — M25.572 PAIN IN JOINT, ANKLE AND FOOT, LEFT: ICD-10-CM

## 2024-03-22 DIAGNOSIS — Z98.890 STATUS POST SURGERY: Primary | ICD-10-CM

## 2024-03-22 PROCEDURE — 97110 THERAPEUTIC EXERCISES: CPT | Mod: GP

## 2024-03-25 ENCOUNTER — THERAPY VISIT (OUTPATIENT)
Dept: PHYSICAL THERAPY | Facility: CLINIC | Age: 64
End: 2024-03-25
Payer: COMMERCIAL

## 2024-03-25 DIAGNOSIS — M25.572 PAIN IN JOINT, ANKLE AND FOOT, LEFT: ICD-10-CM

## 2024-03-25 DIAGNOSIS — Z98.890 STATUS POST SURGERY: Primary | ICD-10-CM

## 2024-03-25 PROCEDURE — 97140 MANUAL THERAPY 1/> REGIONS: CPT | Mod: GP

## 2024-03-25 PROCEDURE — 97110 THERAPEUTIC EXERCISES: CPT | Mod: GP

## 2024-03-25 PROCEDURE — 97032 APPL MODALITY 1+ESTIM EA 15: CPT | Mod: GP

## 2024-03-27 ENCOUNTER — THERAPY VISIT (OUTPATIENT)
Dept: PHYSICAL THERAPY | Facility: CLINIC | Age: 64
End: 2024-03-27
Payer: COMMERCIAL

## 2024-03-27 DIAGNOSIS — Z98.890 STATUS POST SURGERY: Primary | ICD-10-CM

## 2024-03-27 DIAGNOSIS — M25.572 PAIN IN JOINT, ANKLE AND FOOT, LEFT: ICD-10-CM

## 2024-03-27 PROCEDURE — 97110 THERAPEUTIC EXERCISES: CPT | Mod: GP

## 2024-03-27 PROCEDURE — 97032 APPL MODALITY 1+ESTIM EA 15: CPT | Mod: GP

## 2024-03-28 ENCOUNTER — DOCUMENTATION ONLY (OUTPATIENT)
Dept: ORTHOPEDICS | Facility: CLINIC | Age: 64
End: 2024-03-28
Payer: COMMERCIAL

## 2024-03-28 NOTE — PROGRESS NOTES
Received Completed forms Yes   Faxed Forms Faxed To: chet  Fax Number: [phone]   Sent to HIM (Date) 3/26/24

## 2024-03-28 NOTE — PROGRESS NOTES
Received Completed forms Yes   Faxed Forms Faxed To: chet  Fax Number: [phone]   Sent to HIM (Date) 3/27/24

## 2024-04-01 ENCOUNTER — THERAPY VISIT (OUTPATIENT)
Dept: PHYSICAL THERAPY | Facility: CLINIC | Age: 64
End: 2024-04-01
Payer: COMMERCIAL

## 2024-04-01 DIAGNOSIS — Z98.890 STATUS POST SURGERY: Primary | ICD-10-CM

## 2024-04-01 DIAGNOSIS — M25.572 PAIN IN JOINT, ANKLE AND FOOT, LEFT: ICD-10-CM

## 2024-04-01 PROCEDURE — 97110 THERAPEUTIC EXERCISES: CPT | Mod: GP

## 2024-04-01 PROCEDURE — 97032 APPL MODALITY 1+ESTIM EA 15: CPT | Mod: GP

## 2024-04-01 PROCEDURE — 97140 MANUAL THERAPY 1/> REGIONS: CPT | Mod: GP

## 2024-04-03 ENCOUNTER — THERAPY VISIT (OUTPATIENT)
Dept: PHYSICAL THERAPY | Facility: CLINIC | Age: 64
End: 2024-04-03
Payer: COMMERCIAL

## 2024-04-03 DIAGNOSIS — M25.572 PAIN IN JOINT, ANKLE AND FOOT, LEFT: ICD-10-CM

## 2024-04-03 DIAGNOSIS — Z98.890 STATUS POST SURGERY: Primary | ICD-10-CM

## 2024-04-03 PROCEDURE — 97110 THERAPEUTIC EXERCISES: CPT | Mod: GP

## 2024-04-03 PROCEDURE — 97140 MANUAL THERAPY 1/> REGIONS: CPT | Mod: GP

## 2024-04-03 PROCEDURE — 97032 APPL MODALITY 1+ESTIM EA 15: CPT | Mod: GP

## 2024-04-04 DIAGNOSIS — Z98.890 STATUS POST SURGERY: Primary | ICD-10-CM

## 2024-04-09 ENCOUNTER — ANCILLARY PROCEDURE (OUTPATIENT)
Dept: GENERAL RADIOLOGY | Facility: CLINIC | Age: 64
End: 2024-04-09
Attending: ORTHOPAEDIC SURGERY
Payer: COMMERCIAL

## 2024-04-09 ENCOUNTER — OFFICE VISIT (OUTPATIENT)
Dept: ORTHOPEDICS | Facility: CLINIC | Age: 64
End: 2024-04-09
Payer: COMMERCIAL

## 2024-04-09 ENCOUNTER — THERAPY VISIT (OUTPATIENT)
Dept: PHYSICAL THERAPY | Facility: CLINIC | Age: 64
End: 2024-04-09
Payer: COMMERCIAL

## 2024-04-09 DIAGNOSIS — Z98.890 STATUS POST SURGERY: ICD-10-CM

## 2024-04-09 DIAGNOSIS — Z98.890 STATUS POST SURGERY: Primary | ICD-10-CM

## 2024-04-09 DIAGNOSIS — M25.572 PAIN IN JOINT, ANKLE AND FOOT, LEFT: ICD-10-CM

## 2024-04-09 DIAGNOSIS — M25.572 PAIN IN JOINT, ANKLE AND FOOT, LEFT: Primary | ICD-10-CM

## 2024-04-09 DIAGNOSIS — R60.9 SWELLING: ICD-10-CM

## 2024-04-09 PROCEDURE — 97110 THERAPEUTIC EXERCISES: CPT | Mod: GP

## 2024-04-09 PROCEDURE — 99214 OFFICE O/P EST MOD 30 MIN: CPT | Performed by: ORTHOPAEDIC SURGERY

## 2024-04-09 PROCEDURE — 97140 MANUAL THERAPY 1/> REGIONS: CPT | Mod: GP

## 2024-04-09 PROCEDURE — 73650 X-RAY EXAM OF HEEL: CPT | Mod: LT | Performed by: RADIOLOGY

## 2024-04-09 PROCEDURE — 97032 APPL MODALITY 1+ESTIM EA 15: CPT | Mod: GP

## 2024-04-09 NOTE — NURSING NOTE
Reason For Visit:   Chief Complaint   Patient presents with    RECHECK     Follow up left ankle subtalar fusion DOS 12/27/23. Still notes swelling and numbness in left foot. Pt reports that it's still weak       63 year old  1960    Primary MD: Rico Resendiz  Ref. MD: est    There were no vitals taken for this visit.    Pain Assessment  Patient Currently in Pain: Yes  0-10 Pain Scale: 3  Primary Pain Location: Ankle (left ankle and foot)      Chung Castro ATC

## 2024-04-09 NOTE — LETTER
[facility] [address]  [address] [place] [address]  [phone]          April 9, 2024    RE:  Anderson Mitchell                                                                                                                                                       [address]  [place] [address]            To whom it may concern:    Andreson Mitchell is under my professional care for his left foot. He may return to work with only sitting jobs x 6 weeks. In 6 weeks we will evaluate him for further restrictions. Please contact my clinic with questions or concerns.           Sincerely,    Ayush Hamilton MD

## 2024-04-11 ENCOUNTER — THERAPY VISIT (OUTPATIENT)
Dept: PHYSICAL THERAPY | Facility: CLINIC | Age: 64
End: 2024-04-11
Payer: COMMERCIAL

## 2024-04-11 DIAGNOSIS — Z98.890 STATUS POST SURGERY: Primary | ICD-10-CM

## 2024-04-11 DIAGNOSIS — M25.572 PAIN IN JOINT, ANKLE AND FOOT, LEFT: ICD-10-CM

## 2024-04-11 PROCEDURE — 97530 THERAPEUTIC ACTIVITIES: CPT | Mod: GP

## 2024-04-11 PROCEDURE — 97110 THERAPEUTIC EXERCISES: CPT | Mod: GP

## 2024-04-15 ENCOUNTER — VIRTUAL VISIT (OUTPATIENT)
Dept: UROLOGY | Facility: CLINIC | Age: 64
End: 2024-04-15
Payer: COMMERCIAL

## 2024-04-15 DIAGNOSIS — N13.8 BPH WITH OBSTRUCTION/LOWER URINARY TRACT SYMPTOMS: ICD-10-CM

## 2024-04-15 DIAGNOSIS — N28.1 RENAL CYST: ICD-10-CM

## 2024-04-15 DIAGNOSIS — N40.1 BPH WITH OBSTRUCTION/LOWER URINARY TRACT SYMPTOMS: ICD-10-CM

## 2024-04-15 DIAGNOSIS — N52.01 ERECTILE DYSFUNCTION DUE TO ARTERIAL INSUFFICIENCY: Primary | ICD-10-CM

## 2024-04-15 PROCEDURE — 99214 OFFICE O/P EST MOD 30 MIN: CPT | Mod: 95 | Performed by: PHYSICIAN ASSISTANT

## 2024-04-15 ASSESSMENT — PAIN SCALES - GENERAL: PAINLEVEL: NO PAIN (0)

## 2024-04-15 NOTE — PROGRESS NOTES
"Virtual Visit Details    Type of service:  Video Visit     Originating Location (pt. Location): {video visit patient location:050669::\"Home\"}  {PROVIDER LOCATION On-site should be selected for visits conducted from your clinic location or adjoining [facility] hospital, academic office, or other nearby [facility] building. Off-site should be selected for all other provider locations, including home:505602}  Distant Location (provider location):  {virtual location provider:148392}  Platform used for Video Visit: {Virtual Visit Platforms:912011::\"Image Engine Design\"}  "

## 2024-04-15 NOTE — NURSING NOTE
Is the patient currently in the state of [place]? YES    Visit mode:VIDEO    If the visit is dropped, the patient can be reconnected by: VIDEO VISIT: Text to cell phone:   Telephone Information:   Mobile [phone]       Will anyone else be joining the visit? NO  (If patient encounters technical issues they should call [phone])    How would you like to obtain your AVS? MyChart    Are changes needed to the allergy or medication list? No    Are refills needed on medications prescribed by this physician? NO    Reason for visit: RECHECK    Enedina UGALDE

## 2024-04-15 NOTE — LETTER
"4/15/2024       RE: Anderson Mitchell  [address]  [place] [address]     Dear Colleague,    Thank you for referring your patient, Anderson Mitchell, to the [facility] at [facility]. Please see a copy of my visit note below.    Video-Visit Details    Type of service:  Video Visit     Originating Location (pt. Location): Other work    Distant Location (provider location):  On-site  Platform used for Video Visit: [facility]  Start time: 2:22 pm  End time: 2:32pm    CC: ED, LUTS, left renal lesion/Bosniak IIF cyst    HPI:  Anderson \"Charan العلي\" Paula is a pleasant 63 year old male who presents in follow-up of the above.   Initially seen 3/27/23:  Has urgency (georgina in AM), nocturia x 1, stream is strong, not interrupted, minor post void dribbling, no dysuria, sense of very mild pressure at end of penis. No hematuria.     PSA done through [employer] and not avail for review but per pt report was normal.      Had been on Flomax for a year. Has tried sildenafil 50mg (gets a bad headache). Not a full erection with this dose.     Saw Dr. Vivas in 2020 due to complex renal lesion (7 mm left renal inferior pole nonsimple cyst) and was to have a renal MRI in 6 mo follow-up. Pt does not recall this recommendation.     6/19/23  Started on cialis 5mg daily. This has improved his urinary symptoms and some erectile function (although not firm enough).   Renal MRI in April: small nodule with heterogeneous signal along the lower left kidney is again noted but does not appear to show enhancement. Dr. Vivas rec repeat MRI in 1 year.      TODAY 4/15/24  Had been on Cialis 5-10mg daily. Developed penile pain post ankle surgery and stopped this. Has noted worsening urinary stream while off. Pain has resolved. Going to restart this soon.   T-level normal 9/27/23  Renal MRI stable.         EXAM: MR RENAL W/O and W CONTRAST  LOCATION: [facility] " [facility]  DATE: 3/14/2024     INDICATION:  Kidney lesion, native, left  COMPARISON: 04/10/2023  TECHNIQUE: Routine MRI renal protocol including T1 in/out phase, diffusion, multiplane T2, and dynamic T1 with IV contrast.  CONTRAST: 10ml Gadavist     FINDINGS:     RIGHT KIDNEY: 8 mm T2 hyperintense cystic lesion is seen along the inferior pole of the right kidney and was not noted previously. No hydronephrosis is present.     LEFT KIDNEY: Stable size of 8 mm T2 hyperintense cystic lesion along the superior pole of the left kidney. A few thin internal septations are present which were less prominent on the prior exam. 8 mm T1 hyperintense lesion along the inferior pole of the   left kidney also appears unchanged in size with minimally thickened septation measuring up to 3 mm noted on the postcontrast subtraction weighted images (series 10, image 20).     ADDITIONAL FINDINGS: Mild, diffuse hepatic steatosis. Stable appearance of 7 mm cystic lesion along the right hepatic lobe. No intrahepatic or extra hepatic biliary duct dilatation is seen. Gallbladder is unremarkable. The spleen and adrenal glands are   unremarkable. No intra-abdominal lymphadenopathy. No suspicious osseous lesions are seen.                                                                      IMPRESSION:  1.  Stable appearance of complex left renal cysts including an 8 mm Bosniak class II cyst along the upper pole as well as a 7 mm Bosniak class II F cyst along the lower pole. Suggest 6-12 month follow-up exam.  2.  Mild, diffuse hepatic steatosis.     Bosniak MRI characterization     Class I: Well-defined, thin (<= 2mm width) smooth wall; homogenous simple fluid (signal intensity similar to CSF); no septa or calcifications; the wall may enhance     Class II: Three types, all well-defined with thin (<=2 mm) smooth walls;  -Cystic masses with thin (<= 2 mm) and few (1-3) enhancing septa; any nonenhancing septa; may have calcification of any  type  -Homogeneous masses markedly hyperintense at T2-weighted imaging (similar to CSF) at noncontrast MRI  -Homogeneous masses markedly hyperintense at T1-weighted imaging (approximately 2.5 times normal parenchymal signal intensity at noncontrast MRI)     Class IIF: Two types:  -Cystic masses with a smooth minimally thickened (3 mm) enhancing wall, or smooth minimal thickening of one or more enhancing septa, or many (>=4 in number) smooth thin (<=2mm width) enhancing septa  -Cystic masses that are heterogeneously hyperintense on T1-weighted fat-saturated non-contrast imaging     Class III: One or more enhancing thick (>= 4 mm width) or enhancing irregular walls or septa. (Irregular defined as displaying 3mm or smaller obtusely marginated convex protrusions)     Class IV: One or more enhancing nodules (>= 4mm convex protrusion with obtuse margins or a convex protrusion of any size with acute margins)     Bosniak Classification of Cystic Renal Masses, Version 2019: An Update Proposal and Needs Assessment. Cassy SG et al. Radiology 2019.    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    Hernia, abdominal     Hypertension     Malignant neoplasm (H)     gastrointestinal mass-removed 5-2008    Mumps     Sleep apnea     Thalassaemia trait 05/07/2013       Past Surgical History:   Procedure Laterality Date    ABDOMEN SURGERY      ARTHRODESIS FOOT Left 12/27/2023    Procedure: Left Subtalar Joint Fusion and Partial Fibula Excision;  Surgeon: Ayush Hamilton MD;  Location: [facility] OR    BONE MARROW BIOPSY, BONE SPECIMEN, NEEDLE/TROCAR N/A 07/01/2021    Procedure: BONE MARROW BIOPSY;  Surgeon: Ivette Hassan MD;  Location:  GI    COLONOSCOPY  04/06/2012    Procedure:COLONOSCOPY; Surgeon:TAMIE REYNA  RUDY JIM; Location: GI    COLONOSCOPY N/A 9/1/2023    Procedure: Colonoscopy;  Surgeon: Rico Horner MD;  Location:  GI    ESOPHAGOSCOPY, GASTROSCOPY, DUODENOSCOPY (EGD), COMBINED N/A 07/30/2020    Procedure: ESOPHAGOGASTRODUODENOSCOPY, WITH BIOPSY;  Surgeon: Tejas Bradley MD;  [facility]: [facility] OR    HERNIA REPAIR      ORTHOPEDIC SURGERY         Social History     Socioeconomic History    Marital status: Single     Spouse name: Not on file    Number of children: Not on file    Years of education: Not on file    Highest education level: Not on file   Occupational History    Not on file   Tobacco Use    Smoking status: Former     Current packs/day: 0.00     Types: Cigars, Cigarettes    Smokeless tobacco: Never    Tobacco comments:     OCC. CIGAR   Vaping Use    Vaping status: Never Used   Substance and Sexual Activity    Alcohol use: Yes     Alcohol/week: 0.0 standard drinks of alcohol     Comment: 2 a week    Drug use: No    Sexual activity: Never   Other Topics Concern    Parent/sibling w/ CABG, MI or angioplasty before 65F 55M? Not Asked   Social History Narrative    Not on file     Social Determinants of Health     Financial Resource Strain: Not on file   Food Insecurity: Not on file   Transportation Needs: Not on file   Physical Activity: Not on file   Stress: Not on file   Social Connections: Not on file   Interpersonal Safety: Not on file   Housing Stability: Not on file       Family History   Problem Relation Age of Onset    Hypertension Father         Descease    Respiratory Father         YUNG    Cerebrovascular Disease Father         ,    C.A.D. Father     Respiratory Brother         YUNG    Hypertension Brother     Thyroid Disease Mother         descease    Hypertension Brother        Allergies   Allergen Reactions    Cats Itching and Other (See Comments)    Grass     Nkda [No Known Drug Allergy]     No Clinical Screening - See Comments Other (See Comments)    Nuts Itching    Trees         Current Outpatient Medications   Medication Sig Dispense Refill    cetirizine (ZYRTEC) 10 MG tablet Take 10 mg by mouth daily      chlorthalidone (HYGROTON) 25 MG tablet Take 1 tablet by mouth daily at 2 pm      fluticasone (FLONASE) 50 MCG/ACT nasal spray       HYDROcodone-acetaminophen (NORCO) 5-325 MG tablet Take 1-2 tablets by mouth every 4 hours as needed for moderate to severe pain 20 tablet 0    hydrOXYzine HCl (ATARAX) 25 MG tablet Take 1 tablet (25 mg) by mouth every 8 hours as needed for itching 20 tablet 0    hydrOXYzine HCl (ATARAX) 25 MG tablet Take 1 tablet (25 mg) by mouth 3 times daily as needed for itching, anxiety or other (pain) 20 tablet 0    Multiple Vitamin (MULTIVITAMIN) per tablet Take 1 tablet by mouth daily.      tadalafil (CIALIS) 5 MG tablet Take 1 tablet (5 mg) by mouth every 24 hours 90 tablet 4    VERAPAMIL HCL PO Take 240 mg by mouth        No current facility-administered medications for this visit.         PEx:   PSYCH: NAD  EYES: EOMI  MOUTH: MMM  NEURO: AAO x3      A/P: Anderson Mitchell is a 63 year old male with BPH w/ LUTS, ED, renal lesion.   -Renal MRI and follow-up 1 year.   -Can pre-med with lorazepam 0.5mg 30 min prior. He would need to have a .   -Restarty Cilais 5mg daily, if room for improvement, can increase to 10mg daily at 1 week.   -MyChart update on progress. Consider ICI or MUSE for ED if not improved.     Divya Valdes PA-C  [facility]        20 minutes spent on the date of the encounter doing chart review, review of outside records, review of test results, interpretation of tests, patient visit and documentation

## 2024-04-15 NOTE — PROGRESS NOTES
"Video-Visit Details    Type of service:  Video Visit     Originating Location (pt. Location): Other work    Distant Location (provider location):  On-site  Platform used for Video Visit: [facility]  Start time: 2:22 pm  End time: 2:32pm    CC: ED, LUTS, left renal lesion/Bosniak IIF cyst    HPI:  Anderson \"Charan العلي\" Paula is a pleasant 63 year old male who presents in follow-up of the above.   Initially seen 3/27/23:  Has urgency (georgina in AM), nocturia x 1, stream is strong, not interrupted, minor post void dribbling, no dysuria, sense of very mild pressure at end of penis. No hematuria.     PSA done through [employer] and not avail for review but per pt report was normal.      Had been on Flomax for a year. Has tried sildenafil 50mg (gets a bad headache). Not a full erection with this dose.     Saw Dr. Vivas in 2020 due to complex renal lesion (7 mm left renal inferior pole nonsimple cyst) and was to have a renal MRI in 6 mo follow-up. Pt does not recall this recommendation.     6/19/23  Started on cialis 5mg daily. This has improved his urinary symptoms and some erectile function (although not firm enough).   Renal MRI in April: small nodule with heterogeneous signal along the lower left kidney is again noted but does not appear to show enhancement. Dr. Vivas rec repeat MRI in 1 year.      TODAY 4/15/24  Had been on Cialis 5-10mg daily. Developed penile pain post ankle surgery and stopped this. Has noted worsening urinary stream while off. Pain has resolved. Going to restart this soon.   T-level normal 9/27/23  Renal MRI stable.         EXAM: MR RENAL W/O and W CONTRAST  LOCATION: [facility]  DATE: 3/14/2024     INDICATION:  Kidney lesion, native, left  COMPARISON: 04/10/2023  TECHNIQUE: Routine MRI renal protocol including T1 in/out phase, diffusion, multiplane T2, and dynamic T1 with IV contrast.  CONTRAST: 10ml Gadavist     FINDINGS:     RIGHT KIDNEY: 8 mm T2 hyperintense cystic " lesion is seen along the inferior pole of the right kidney and was not noted previously. No hydronephrosis is present.     LEFT KIDNEY: Stable size of 8 mm T2 hyperintense cystic lesion along the superior pole of the left kidney. A few thin internal septations are present which were less prominent on the prior exam. 8 mm T1 hyperintense lesion along the inferior pole of the   left kidney also appears unchanged in size with minimally thickened septation measuring up to 3 mm noted on the postcontrast subtraction weighted images (series 10, image 20).     ADDITIONAL FINDINGS: Mild, diffuse hepatic steatosis. Stable appearance of 7 mm cystic lesion along the right hepatic lobe. No intrahepatic or extra hepatic biliary duct dilatation is seen. Gallbladder is unremarkable. The spleen and adrenal glands are   unremarkable. No intra-abdominal lymphadenopathy. No suspicious osseous lesions are seen.                                                                      IMPRESSION:  1.  Stable appearance of complex left renal cysts including an 8 mm Bosniak class II cyst along the upper pole as well as a 7 mm Bosniak class II F cyst along the lower pole. Suggest 6-12 month follow-up exam.  2.  Mild, diffuse hepatic steatosis.     Bosniak MRI characterization     Class I: Well-defined, thin (<= 2mm width) smooth wall; homogenous simple fluid (signal intensity similar to CSF); no septa or calcifications; the wall may enhance     Class II: Three types, all well-defined with thin (<=2 mm) smooth walls;  -Cystic masses with thin (<= 2 mm) and few (1-3) enhancing septa; any nonenhancing septa; may have calcification of any type  -Homogeneous masses markedly hyperintense at T2-weighted imaging (similar to CSF) at noncontrast MRI  -Homogeneous masses markedly hyperintense at T1-weighted imaging (approximately 2.5 times normal parenchymal signal intensity at noncontrast MRI)     Class IIF: Two types:  -Cystic masses with a smooth  minimally thickened (3 mm) enhancing wall, or smooth minimal thickening of one or more enhancing septa, or many (>=4 in number) smooth thin (<=2mm width) enhancing septa  -Cystic masses that are heterogeneously hyperintense on T1-weighted fat-saturated non-contrast imaging     Class III: One or more enhancing thick (>= 4 mm width) or enhancing irregular walls or septa. (Irregular defined as displaying 3mm or smaller obtusely marginated convex protrusions)     Class IV: One or more enhancing nodules (>= 4mm convex protrusion with obtuse margins or a convex protrusion of any size with acute margins)     Bosniak Classification of Cystic Renal Masses, Version 2019: An Update Proposal and Needs Assessment. Cassy SG et al. Radiology 2019.    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    Hernia, abdominal     Hypertension     Malignant neoplasm (H)     gastrointestinal mass-removed 5-2008    Mumps     Sleep apnea     Thalassaemia trait 05/07/2013       Past Surgical History:   Procedure Laterality Date    ABDOMEN SURGERY      ARTHRODESIS FOOT Left 12/27/2023    Procedure: Left Subtalar Joint Fusion and Partial Fibula Excision;  Surgeon: Ayush Hamilton MD;  [facility]: [facility]    BONE MARROW BIOPSY, BONE SPECIMEN, NEEDLE/TROCAR N/A 07/01/2021    Procedure: BONE MARROW BIOPSY;  Surgeon: Ivette Hassan MD;  Location:  GI    COLONOSCOPY  04/06/2012    Procedure:COLONOSCOPY; Surgeon:TAMIE REYNA; Location: GI    COLONOSCOPY N/A 9/1/2023    Procedure: Colonoscopy;  Surgeon: Rico Horner MD;  Location:  GI    ESOPHAGOSCOPY, GASTROSCOPY, DUODENOSCOPY (EGD), COMBINED N/A 07/30/2020    Procedure: ESOPHAGOGASTRODUODENOSCOPY, WITH BIOPSY;  Surgeon: Tejas Bradley MD;  Location:  OR     HERNIA REPAIR      ORTHOPEDIC SURGERY         Social History     Socioeconomic History    Marital status: Single     Spouse name: Not on file    Number of children: Not on file    Years of education: Not on file    Highest education level: Not on file   Occupational History    Not on file   Tobacco Use    Smoking status: Former     Current packs/day: 0.00     Types: Cigars, Cigarettes    Smokeless tobacco: Never    Tobacco comments:     OCC. CIGAR   Vaping Use    Vaping status: Never Used   Substance and Sexual Activity    Alcohol use: Yes     Alcohol/week: 0.0 standard drinks of alcohol     Comment: 2 a week    Drug use: No    Sexual activity: Never   Other Topics Concern    Parent/sibling w/ CABG, MI or angioplasty before 65F 55M? Not Asked   Social History Narrative    Not on file     Social Determinants of Health     Financial Resource Strain: Not on file   Food Insecurity: Not on file   Transportation Needs: Not on file   Physical Activity: Not on file   Stress: Not on file   Social Connections: Not on file   Interpersonal Safety: Not on file   Housing Stability: Not on file       Family History   Problem Relation Age of Onset    Hypertension Father         Descease    Respiratory Father         YUNG    Cerebrovascular Disease Father         ,    C.A.D. Father     Respiratory Brother         YUNG    Hypertension Brother     Thyroid Disease Mother         descease    Hypertension Brother        Allergies   Allergen Reactions    Cats Itching and Other (See Comments)    Grass     Nkda [No Known Drug Allergy]     No Clinical Screening - See Comments Other (See Comments)    Nuts Itching    Trees        Current Outpatient Medications   Medication Sig Dispense Refill    cetirizine (ZYRTEC) 10 MG tablet Take 10 mg by mouth daily      chlorthalidone (HYGROTON) 25 MG tablet Take 1 tablet by mouth daily at 2 pm      fluticasone (FLONASE) 50 MCG/ACT nasal spray       HYDROcodone-acetaminophen (NORCO) 5-325 MG tablet Take 1-2  tablets by mouth every 4 hours as needed for moderate to severe pain 20 tablet 0    hydrOXYzine HCl (ATARAX) 25 MG tablet Take 1 tablet (25 mg) by mouth every 8 hours as needed for itching 20 tablet 0    hydrOXYzine HCl (ATARAX) 25 MG tablet Take 1 tablet (25 mg) by mouth 3 times daily as needed for itching, anxiety or other (pain) 20 tablet 0    Multiple Vitamin (MULTIVITAMIN) per tablet Take 1 tablet by mouth daily.      tadalafil (CIALIS) 5 MG tablet Take 1 tablet (5 mg) by mouth every 24 hours 90 tablet 4    VERAPAMIL HCL PO Take 240 mg by mouth        No current facility-administered medications for this visit.         PEx:   PSYCH: NAD  EYES: EOMI  MOUTH: MMM  NEURO: AAO x3      A/P: Anderson Mitchell is a 63 year old male with BPH w/ LUTS, ED, renal lesion.   -Renal MRI and follow-up 1 year.   -Can pre-med with lorazepam 0.5mg 30 min prior. He would need to have a .   -Restarty Cilais 5mg daily, if room for improvement, can increase to 10mg daily at 1 week.   -MyChart update on progress. Consider ICI or MUSE for ED if not improved.     OCHOA Russ [facility]        20 minutes spent on the date of the encounter doing chart review, review of outside records, review of test results, interpretation of tests, patient visit and documentation

## 2024-04-16 ENCOUNTER — THERAPY VISIT (OUTPATIENT)
Dept: PHYSICAL THERAPY | Facility: CLINIC | Age: 64
End: 2024-04-16
Payer: COMMERCIAL

## 2024-04-16 DIAGNOSIS — Z98.890 STATUS POST SURGERY: Primary | ICD-10-CM

## 2024-04-16 DIAGNOSIS — M25.572 PAIN IN JOINT, ANKLE AND FOOT, LEFT: ICD-10-CM

## 2024-04-16 PROCEDURE — 97530 THERAPEUTIC ACTIVITIES: CPT | Mod: GP | Performed by: PHYSICAL THERAPIST

## 2024-04-16 PROCEDURE — 97110 THERAPEUTIC EXERCISES: CPT | Mod: 59 | Performed by: PHYSICAL THERAPIST

## 2024-04-18 ENCOUNTER — THERAPY VISIT (OUTPATIENT)
Dept: PHYSICAL THERAPY | Facility: CLINIC | Age: 64
End: 2024-04-18
Payer: COMMERCIAL

## 2024-04-18 DIAGNOSIS — Z98.890 STATUS POST SURGERY: Primary | ICD-10-CM

## 2024-04-18 DIAGNOSIS — M25.572 PAIN IN JOINT, ANKLE AND FOOT, LEFT: ICD-10-CM

## 2024-04-18 PROCEDURE — 97110 THERAPEUTIC EXERCISES: CPT | Mod: GP | Performed by: PHYSICAL THERAPIST

## 2024-04-18 PROCEDURE — 97112 NEUROMUSCULAR REEDUCATION: CPT | Mod: GP | Performed by: PHYSICAL THERAPIST

## 2024-04-25 ENCOUNTER — DOCUMENTATION ONLY (OUTPATIENT)
Dept: ORTHOPEDICS | Facility: CLINIC | Age: 64
End: 2024-04-25

## 2024-04-25 ENCOUNTER — THERAPY VISIT (OUTPATIENT)
Dept: PHYSICAL THERAPY | Facility: CLINIC | Age: 64
End: 2024-04-25
Payer: COMMERCIAL

## 2024-04-25 DIAGNOSIS — Z98.890 STATUS POST SURGERY: Primary | ICD-10-CM

## 2024-04-25 DIAGNOSIS — M25.572 PAIN IN JOINT, ANKLE AND FOOT, LEFT: ICD-10-CM

## 2024-04-25 PROCEDURE — 97140 MANUAL THERAPY 1/> REGIONS: CPT | Mod: GP | Performed by: PHYSICAL THERAPIST

## 2024-04-25 PROCEDURE — 97110 THERAPEUTIC EXERCISES: CPT | Mod: GP | Performed by: PHYSICAL THERAPIST

## 2024-04-25 PROCEDURE — 97112 NEUROMUSCULAR REEDUCATION: CPT | Mod: GP | Performed by: PHYSICAL THERAPIST

## 2024-04-25 NOTE — PROGRESS NOTES
Received Completed forms Yes   Faxed Forms Faxed To: [facility]  Fax Number: [phone]   Sent to HIM (Date) 4/24/24

## 2024-05-02 ENCOUNTER — THERAPY VISIT (OUTPATIENT)
Dept: PHYSICAL THERAPY | Facility: CLINIC | Age: 64
End: 2024-05-02
Payer: COMMERCIAL

## 2024-05-02 DIAGNOSIS — M25.572 PAIN IN JOINT, ANKLE AND FOOT, LEFT: ICD-10-CM

## 2024-05-02 DIAGNOSIS — Z98.890 STATUS POST SURGERY: Primary | ICD-10-CM

## 2024-05-02 PROCEDURE — 97112 NEUROMUSCULAR REEDUCATION: CPT | Mod: GP | Performed by: PHYSICAL THERAPIST

## 2024-05-02 PROCEDURE — 97110 THERAPEUTIC EXERCISES: CPT | Mod: GP | Performed by: PHYSICAL THERAPIST

## 2024-05-02 PROCEDURE — 97530 THERAPEUTIC ACTIVITIES: CPT | Mod: GP | Performed by: PHYSICAL THERAPIST

## 2024-05-06 ENCOUNTER — MEDICAL CORRESPONDENCE (OUTPATIENT)
Dept: HEALTH INFORMATION MANAGEMENT | Facility: CLINIC | Age: 64
End: 2024-05-06
Payer: COMMERCIAL

## 2024-05-09 ENCOUNTER — THERAPY VISIT (OUTPATIENT)
Dept: PHYSICAL THERAPY | Facility: CLINIC | Age: 64
End: 2024-05-09
Payer: COMMERCIAL

## 2024-05-09 DIAGNOSIS — M25.572 PAIN IN JOINT, ANKLE AND FOOT, LEFT: ICD-10-CM

## 2024-05-09 DIAGNOSIS — Z98.890 STATUS POST SURGERY: Primary | ICD-10-CM

## 2024-05-09 PROCEDURE — 97530 THERAPEUTIC ACTIVITIES: CPT | Mod: GP | Performed by: PHYSICAL THERAPIST

## 2024-05-09 PROCEDURE — 97112 NEUROMUSCULAR REEDUCATION: CPT | Mod: GP | Performed by: PHYSICAL THERAPIST

## 2024-05-09 PROCEDURE — 97110 THERAPEUTIC EXERCISES: CPT | Mod: GP | Performed by: PHYSICAL THERAPIST

## 2024-05-13 DIAGNOSIS — Z98.890 STATUS POST SURGERY: Primary | ICD-10-CM

## 2024-05-14 ENCOUNTER — LAB (OUTPATIENT)
Dept: LAB | Facility: CLINIC | Age: 64
End: 2024-05-14
Payer: COMMERCIAL

## 2024-05-14 DIAGNOSIS — D47.2 SMOLDERING MYELOMA: ICD-10-CM

## 2024-05-14 LAB
ERYTHROCYTE [DISTWIDTH] IN BLOOD BY AUTOMATED COUNT: 12.7 % (ref 10–15)
HCT VFR BLD AUTO: 40.7 % (ref 40–53)
HGB BLD-MCNC: 14.6 G/DL (ref 13.3–17.7)
MCH RBC QN AUTO: 28.9 PG (ref 26.5–33)
MCHC RBC AUTO-ENTMCNC: 35.9 G/DL (ref 31.5–36.5)
MCV RBC AUTO: 81 FL (ref 78–100)
PLATELET # BLD AUTO: 241 10E3/UL (ref 150–450)
RBC # BLD AUTO: 5.05 10E6/UL (ref 4.4–5.9)
WBC # BLD AUTO: 4 10E3/UL (ref 4–11)

## 2024-05-14 PROCEDURE — 83521 IG LIGHT CHAINS FREE EACH: CPT

## 2024-05-14 PROCEDURE — 36415 COLL VENOUS BLD VENIPUNCTURE: CPT

## 2024-05-14 PROCEDURE — 82784 ASSAY IGA/IGD/IGG/IGM EACH: CPT

## 2024-05-14 PROCEDURE — 84165 PROTEIN E-PHORESIS SERUM: CPT | Performed by: PATHOLOGY

## 2024-05-14 PROCEDURE — 85027 COMPLETE CBC AUTOMATED: CPT

## 2024-05-14 PROCEDURE — 80048 BASIC METABOLIC PNL TOTAL CA: CPT

## 2024-05-14 PROCEDURE — 84155 ASSAY OF PROTEIN SERUM: CPT

## 2024-05-15 LAB
ALBUMIN SERPL ELPH-MCNC: 4 G/DL (ref 3.7–5.1)
ALPHA1 GLOB SERPL ELPH-MCNC: 0.2 G/DL (ref 0.2–0.4)
ALPHA2 GLOB SERPL ELPH-MCNC: 0.4 G/DL (ref 0.5–0.9)
ANION GAP SERPL CALCULATED.3IONS-SCNC: 10 MMOL/L (ref 7–15)
B-GLOBULIN SERPL ELPH-MCNC: 0.5 G/DL (ref 0.6–1)
BUN SERPL-MCNC: 12.6 MG/DL (ref 8–23)
CALCIUM SERPL-MCNC: 8.9 MG/DL (ref 8.8–10.2)
CHLORIDE SERPL-SCNC: 105 MMOL/L (ref 98–107)
CREAT SERPL-MCNC: 1.11 MG/DL (ref 0.67–1.17)
DEPRECATED HCO3 PLAS-SCNC: 25 MMOL/L (ref 22–29)
EGFRCR SERPLBLD CKD-EPI 2021: 76 ML/MIN/1.73M2
GAMMA GLOB SERPL ELPH-MCNC: 1.2 G/DL (ref 0.7–1.6)
GLUCOSE SERPL-MCNC: 147 MG/DL (ref 70–99)
IGG SERPL-MCNC: 1341 MG/DL (ref 610–1616)
KAPPA LC FREE SER-MCNC: 1.39 MG/DL (ref 0.33–1.94)
KAPPA LC FREE/LAMBDA FREE SER NEPH: 0.3 {RATIO} (ref 0.26–1.65)
LAMBDA LC FREE SERPL-MCNC: 4.69 MG/DL (ref 0.57–2.63)
M PROTEIN SERPL ELPH-MCNC: 0.8 G/DL
PATH REPORT.COMMENTS IMP SPEC: ABNORMAL
POTASSIUM SERPL-SCNC: 3.6 MMOL/L (ref 3.4–5.3)
PROT PATTERN SERPL ELPH-IMP: ABNORMAL
SODIUM SERPL-SCNC: 140 MMOL/L (ref 135–145)
TOTAL PROTEIN SERUM FOR ELP: 6.4 G/DL (ref 6.4–8.3)

## 2024-05-21 ENCOUNTER — OFFICE VISIT (OUTPATIENT)
Dept: ORTHOPEDICS | Facility: CLINIC | Age: 64
End: 2024-05-21
Payer: COMMERCIAL

## 2024-05-21 ENCOUNTER — ANCILLARY PROCEDURE (OUTPATIENT)
Dept: GENERAL RADIOLOGY | Facility: CLINIC | Age: 64
End: 2024-05-21
Attending: ORTHOPAEDIC SURGERY
Payer: COMMERCIAL

## 2024-05-21 DIAGNOSIS — M25.572 PAIN IN JOINT, ANKLE AND FOOT, LEFT: Primary | ICD-10-CM

## 2024-05-21 DIAGNOSIS — D47.2 SMOLDERING MYELOMA: Primary | ICD-10-CM

## 2024-05-21 DIAGNOSIS — Z98.890 STATUS POST SURGERY: ICD-10-CM

## 2024-05-21 PROCEDURE — 73650 X-RAY EXAM OF HEEL: CPT | Mod: LT | Performed by: RADIOLOGY

## 2024-05-21 PROCEDURE — 99213 OFFICE O/P EST LOW 20 MIN: CPT | Performed by: ORTHOPAEDIC SURGERY

## 2024-05-21 NOTE — LETTER
5/21/2024         RE: Anderson Mitchell  [address]  [place] [address]        Dear Colleague,    Thank you for referring your patient, Anderson Mitchell, to the [facility] [place]. Please see a copy of my visit note below.    Chief complaint: Status post left subtalar joint arthrodesis performed December 27, 2023    Patient presents today for further follow-up.  Reports to have been retired for the past week.  Reports to be doing quite well with the foot denies to have any pain reports just to have some numbness and tingling.    On today's visit he presents with full range of motion of the ankle with some diffuse swelling.  There is no pain on palpation.    Plain x-rays of the left calcaneus were obtained today which are significant for showing what seems to be a solid fusion across the arthrodesis site.  Hardware is intact and in place    Assessment: Status post left subtalar joint arthrodesis    Plan: I discussed with the patient to continue working with physical therapy.  The patient has no restrictions.  Encouraged the patient to visit with us in 3 months from now he is not happy the function of his left foot.    All questions were answered.    TT 20 minutes          Ayush Hamilton MD

## 2024-05-21 NOTE — NURSING NOTE
"Reason For Visit:   Chief Complaint   Patient presents with    RECHECK     Left ankle subtalar fusion 12/27/23. Doing \"ok\"       There were no vitals taken for this visit.         Nova Chiu, ATC    "

## 2024-05-21 NOTE — PROGRESS NOTES
Chief complaint: Status post left subtalar joint arthrodesis performed December 27, 2023    Patient presents today for further follow-up.  Reports to have been retired for the past week.  Reports to be doing quite well with the foot denies to have any pain reports just to have some numbness and tingling.    On today's visit he presents with full range of motion of the ankle with some diffuse swelling.  There is no pain on palpation.    Plain x-rays of the left calcaneus were obtained today which are significant for showing what seems to be a solid fusion across the arthrodesis site.  Hardware is intact and in place    Assessment: Status post left subtalar joint arthrodesis    Plan: I discussed with the patient to continue working with physical therapy.  The patient has no restrictions.  Encouraged the patient to visit with us in 3 months from now he is not happy the function of his left foot.    All questions were answered.    TT 20 minutes

## 2024-05-23 ENCOUNTER — THERAPY VISIT (OUTPATIENT)
Dept: PHYSICAL THERAPY | Facility: CLINIC | Age: 64
End: 2024-05-23
Payer: COMMERCIAL

## 2024-05-23 DIAGNOSIS — Z98.890 STATUS POST SURGERY: Primary | ICD-10-CM

## 2024-05-23 DIAGNOSIS — M25.572 PAIN IN JOINT, ANKLE AND FOOT, LEFT: ICD-10-CM

## 2024-05-23 PROCEDURE — 97530 THERAPEUTIC ACTIVITIES: CPT | Mod: GP | Performed by: PHYSICAL THERAPIST

## 2024-05-23 PROCEDURE — 97112 NEUROMUSCULAR REEDUCATION: CPT | Mod: GP | Performed by: PHYSICAL THERAPIST

## 2024-05-26 ENCOUNTER — HEALTH MAINTENANCE LETTER (OUTPATIENT)
Age: 64
End: 2024-05-26

## 2024-05-30 ENCOUNTER — THERAPY VISIT (OUTPATIENT)
Dept: PHYSICAL THERAPY | Facility: CLINIC | Age: 64
End: 2024-05-30
Payer: COMMERCIAL

## 2024-05-30 DIAGNOSIS — M25.572 PAIN IN JOINT, ANKLE AND FOOT, LEFT: ICD-10-CM

## 2024-05-30 DIAGNOSIS — Z98.890 STATUS POST SURGERY: Primary | ICD-10-CM

## 2024-05-30 PROCEDURE — 97112 NEUROMUSCULAR REEDUCATION: CPT | Mod: GP | Performed by: PHYSICAL THERAPIST

## 2024-06-05 DIAGNOSIS — N13.8 BPH WITH OBSTRUCTION/LOWER URINARY TRACT SYMPTOMS: ICD-10-CM

## 2024-06-05 DIAGNOSIS — N40.1 BPH WITH OBSTRUCTION/LOWER URINARY TRACT SYMPTOMS: ICD-10-CM

## 2024-06-05 DIAGNOSIS — N52.01 ERECTILE DYSFUNCTION DUE TO ARTERIAL INSUFFICIENCY: ICD-10-CM

## 2024-06-05 RX ORDER — TADALAFIL 5 MG/1
5 TABLET ORAL EVERY 24 HOURS
Qty: 90 TABLET | Refills: 3 | Status: SHIPPED | OUTPATIENT
Start: 2024-06-05

## 2024-06-14 ENCOUNTER — THERAPY VISIT (OUTPATIENT)
Dept: PHYSICAL THERAPY | Facility: CLINIC | Age: 64
End: 2024-06-14
Payer: COMMERCIAL

## 2024-06-14 DIAGNOSIS — M25.572 PAIN IN JOINT, ANKLE AND FOOT, LEFT: ICD-10-CM

## 2024-06-14 DIAGNOSIS — Z98.890 STATUS POST SURGERY: Primary | ICD-10-CM

## 2024-06-14 PROCEDURE — 97112 NEUROMUSCULAR REEDUCATION: CPT | Mod: GP | Performed by: PHYSICAL THERAPIST

## 2024-06-26 ENCOUNTER — THERAPY VISIT (OUTPATIENT)
Dept: PHYSICAL THERAPY | Facility: CLINIC | Age: 64
End: 2024-06-26
Payer: COMMERCIAL

## 2024-06-26 DIAGNOSIS — Z98.890 STATUS POST SURGERY: Primary | ICD-10-CM

## 2024-06-26 DIAGNOSIS — M25.572 PAIN IN JOINT, ANKLE AND FOOT, LEFT: ICD-10-CM

## 2024-06-26 PROCEDURE — 97116 GAIT TRAINING THERAPY: CPT | Mod: GP | Performed by: PHYSICAL THERAPIST

## 2024-06-26 PROCEDURE — 97110 THERAPEUTIC EXERCISES: CPT | Mod: GP | Performed by: PHYSICAL THERAPIST

## 2024-07-24 ENCOUNTER — THERAPY VISIT (OUTPATIENT)
Dept: PHYSICAL THERAPY | Facility: CLINIC | Age: 64
End: 2024-07-24
Payer: MEDICAID

## 2024-07-24 DIAGNOSIS — Z98.890 STATUS POST SURGERY: Primary | ICD-10-CM

## 2024-07-24 DIAGNOSIS — M25.572 PAIN IN JOINT, ANKLE AND FOOT, LEFT: ICD-10-CM

## 2024-07-24 PROCEDURE — 97110 THERAPEUTIC EXERCISES: CPT | Mod: GP | Performed by: PHYSICAL THERAPIST

## 2024-07-24 PROCEDURE — 97116 GAIT TRAINING THERAPY: CPT | Mod: GP | Performed by: PHYSICAL THERAPIST

## 2024-08-05 NOTE — PROGRESS NOTES
[facility]                                                                                   OUTPATIENT PHYSICAL THERAPY    PLAN OF TREATMENT FOR OUTPATIENT REHABILITATION   Patient's Last Name, First Name, Anderson Hicks YOB: 1960   Provider's Name   [facility]   Medical Record No.  [id number]     Onset Date: 12/27/23  Start of Care Date: 03/14/24     Medical Diagnosis:  L ankle pain s/p ankle surgery      PT Treatment Diagnosis:  L ankle pain s/p ankle surgery Plan of Treatment  Frequency/Duration: 2x/month/ 3 months    Certification date from 06/14/24 to 09/14/24         See note for plan of treatment details and functional goals     ALEX CHACON, PT                         I CERTIFY THE NEED FOR THESE SERVICES FURNISHED UNDER        THIS PLAN OF TREATMENT AND WHILE UNDER MY CARE     (Physician attestation of this document indicates review and certification of the therapy plan).              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 post op   Other pertinent information L subtalar fusion on 12/27/23   Quick Adds Certification   Progress Note/Certification   Start of Care Date 03/14/24   Onset of illness/injury or Date of Surgery 12/27/23   Therapy Frequency 2x/month   Predicted Duration 3 months   Certification date from 06/14/24   Certification date to 09/14/24   Progress Note Due Date 06/06/24   Progress Note Completed Date 03/14/24   GOALS   PT Goals 2;3   PT Goal 1   Goal Identifier muscle activation   Goal Description pt will be able  to actively move ankle in all 4 directions - DF, PF, inversion, eversion   Rationale to maximize safety and independence with performance of ADLs and functional tasks;to maximize safety and independence with self cares;to maximize safety and independence within the home;to maximize safety and independence within the community;to maximize safety and independence with transportation   Goal Progress Still limited severely but improving   Target Date 06/06/24   PT Goal 2   Goal Identifier ROM   Goal Description pt will have PROM WFL in order to be able to perform ADLs and reduce stiffness and pain   Rationale to maximize safety and independence with performance of ADLs and functional tasks;to maximize safety and independence within the home;to maximize safety and independence within the community;to maximize safety and independence with transportation;to maximize safety and independence with self cares   Target Date 06/06/24   PT Goal 3   Goal Identifier ambulation   Goal Description pt will be able to ambulate 15 minutes with <2/10 pain and LRAD   Rationale to maximize safety and independence within the community;to maximize safety and independence with performance of ADLs and functional tasks;to maximize safety and independence within the home;to maximize safety and independence with self cares   Target Date 06/06/24   Date Met 04/25/24   Subjective Report   Subjective Report Patient noticing he can't turn fast without keeping his balance. Patient noticing more toe movement, first time walking on the foot feels really tight.   Objective Measures   Objective Measures Objective Measure 1;Objective Measure 2   Objective Measure 1   Objective Measure MRI results per pt report:   Details Lateral compartment:     Lateral meniscus: There is free edge fraying versus ill-defined free edge tear of the body of the lateral meniscus, new compared to previous MRI 8/27/2024. No unstable lateral meniscal tear is seen.     Cartilage:  1.5 x 1.5 cm area of full-thickness chondral loss over the posterior portion of the lateral tibial plateau with slight associated subchondral cystic changes and a 2.0 x 1.0 cm area of near full-thickness chondral loss over the posterior weightbearing portion of the lateral femoral condyle, new compared to previous MRI 8/27/2004.       Ligaments:     Anterior cruciate ligament: Intact.     Posterior cruciate ligament: Intact.     Medial collateral ligament: Intact.     Posterior oblique ligament: Intact.     Fibular collateral ligament: Intact.     Posterolateral corner:     The distal biceps femoris tendon, iliotibial band, popliteus tendon, popliteus muscle, popliteofibular ligament, and arcuate ligament are intact.     Extensor mechanism:     Patellar tendon: Intact.     Quadriceps tendon: Intact.   Objective Measure 2   Objective Measure Patient with minimal to foot/toe muscle activation with exercise   PT Modalities   PT Modalities Electrical Stimulation   Treatment Interventions (PT)   Interventions Therapeutic Procedure/Exercise;Manual Therapy;Self Care/Home Management;Therapeutic Activity;Neuromuscular Re-education   Therapeutic Procedure/Exercise   Therapeutic Procedures Ther Proc 2;Ther Proc 3;Ther Proc 4;Ther Proc 5;Ther Proc 6   PTRx Ther Proc 1 Long Sit Gastroc Stretch With Towel   PTRx Ther Proc 1 - Details HEP   PTRx Ther Proc 2 Seated Ankle Soleus Stretch With Towel    PTRx Ther Proc 2 - Details HEP   PTRx Ther Proc 3 Ankle Pumps in Long Sitting   PTRx Ther Proc 3 - Details HEP   PTRx Ther Proc 4 MLD Ankle   PTRx Ther Proc 4 - Details HEP   PTRx Ther Proc 5 Hip Flexion Straight Leg Raise   PTRx Ther Proc 5 - Details HEP   PTRx Ther Proc 6 Seated Knee Extension With Theraband   PTRx Ther Proc 6 - Details HEP   PTRx Ther Proc 7 Standing Hamstring Curl Knee Flexion   PTRx Ther Proc 7 - Details HEP   PTRx Ther Proc 8 Standing Weight Shift   PTRx Ther Proc 8 - Details HEP   PTRx Ther Proc 9 Knee Bends    PTRx Ther Proc 9 - Details HEP   PTRx Ther Proc 10 Standing Hip Flexion   PTRx Ther Proc 10 - Details HEP   PTRx Ther Proc 11 Hip AROM Standing Abduction   PTRx Ther Proc 11 - Details HEP   PTRx Ther Proc 12 Standing Alternating Plantarflexion and Dorsiflexion   PTRx Ther Proc 12 - Details HEP   Neuromuscular Re-education   Neuromuscular re-ed of mvmt, balance, coord, kinesthetic sense, posture, proprioception minutes (77900) 35   Neuromuscular Re-education Neuro Re-ed 2;Neuro Re-ed 3;Neuro Re-ed 4;Neuro Re-ed 5   Neuro Re-ed 1 Bosu ball   Neuro Re-ed 1 - Details squats x 15 reps   Neuro Re-ed 2 Bosu ball step ups   Neuro Re-ed 2 - Details x 15 reps LLE with opposite leg lift, lateral steps x 15 reps for balance and LE strengthening   Neuro Re-ed 3 Balance boards   Neuro Re-ed 3 - Details ball side/side x 1 min each way, round x 1 min 2 sets   Neuro Re-ed 4 Bounding   Neuro Re-ed 4 - Details forwards, sideways, backwards x 5 min with 3lb weight on LLE for extra challenge, with cones for SL support x 2 sets down/back   Skilled Intervention cues for posture, form, muscles to be activated including the ankles and inner foot, expected outcome being fatigue in the legs/feet, challenged today for neuro-re-ed   Patient Response/Progress fatigue but no inc of symptoms today   Neuro Re-ed 5 Bosu ball wall ball toss   Neuro Re-ed 5 - Details 30 sec holds x 2 reps   Education   Learner/Method Patient   Plan   Home program ptrx printout   Plan for next session progress HEP as indicated   Comments   Comments Patient arrived 5 minutes late   Total Session Time   Timed Code Treatment Minutes 35   Total Treatment Time (sum of timed and untimed services) 35

## 2024-08-09 DIAGNOSIS — R35.0 URINARY FREQUENCY: ICD-10-CM

## 2024-08-09 DIAGNOSIS — N40.1 BPH WITH OBSTRUCTION/LOWER URINARY TRACT SYMPTOMS: Primary | ICD-10-CM

## 2024-08-09 DIAGNOSIS — N13.8 BPH WITH OBSTRUCTION/LOWER URINARY TRACT SYMPTOMS: Primary | ICD-10-CM

## 2024-08-15 ENCOUNTER — THERAPY VISIT (OUTPATIENT)
Dept: PHYSICAL THERAPY | Facility: CLINIC | Age: 64
End: 2024-08-15
Payer: COMMERCIAL

## 2024-08-15 DIAGNOSIS — Z98.890 STATUS POST SURGERY: Primary | ICD-10-CM

## 2024-08-15 DIAGNOSIS — M25.572 PAIN IN JOINT, ANKLE AND FOOT, LEFT: ICD-10-CM

## 2024-08-15 PROCEDURE — 97140 MANUAL THERAPY 1/> REGIONS: CPT | Mod: GP | Performed by: PHYSICAL THERAPIST

## 2024-08-15 PROCEDURE — 97110 THERAPEUTIC EXERCISES: CPT | Mod: GP | Performed by: PHYSICAL THERAPIST

## 2024-09-05 ENCOUNTER — ALLIED HEALTH/NURSE VISIT (OUTPATIENT)
Dept: UROLOGY | Facility: CLINIC | Age: 64
End: 2024-09-05
Payer: COMMERCIAL

## 2024-09-05 DIAGNOSIS — N13.8 BPH WITH OBSTRUCTION/LOWER URINARY TRACT SYMPTOMS: Primary | ICD-10-CM

## 2024-09-05 DIAGNOSIS — N40.1 BPH WITH OBSTRUCTION/LOWER URINARY TRACT SYMPTOMS: Primary | ICD-10-CM

## 2024-09-05 DIAGNOSIS — R35.0 URINARY FREQUENCY: ICD-10-CM

## 2024-09-05 LAB
ALBUMIN UR-MCNC: NEGATIVE MG/DL
APPEARANCE UR: CLEAR
BILIRUB UR QL STRIP: NEGATIVE
COLOR UR AUTO: YELLOW
GLUCOSE UR STRIP-MCNC: NEGATIVE MG/DL
HGB UR QL STRIP: NEGATIVE
KETONES UR STRIP-MCNC: NEGATIVE MG/DL
LEUKOCYTE ESTERASE UR QL STRIP: NEGATIVE
NITRATE UR QL: NEGATIVE
PH UR STRIP: 7 [PH] (ref 5–7)
RESIDUAL VOLUME (RV) (EXTERNAL): 22
SP GR UR STRIP: 1.02 (ref 1–1.03)
UROBILINOGEN UR STRIP-ACNC: 0.2 E.U./DL

## 2024-09-05 PROCEDURE — 87086 URINE CULTURE/COLONY COUNT: CPT

## 2024-09-05 PROCEDURE — 51798 US URINE CAPACITY MEASURE: CPT

## 2024-09-05 PROCEDURE — 81003 URINALYSIS AUTO W/O SCOPE: CPT | Mod: QW

## 2024-09-05 NOTE — PROGRESS NOTES
Anderson Zhuhear comes into clinic today for Incomplete Bladder Emptying  at the request of Divya Valdes PAC Ordering Provider for PVR/UA/UC  This service provided today was under the supervising provider of the apt MONTOYA CNP , who was available if needed.  post void residual by bladder scan 22 ml  UA/UC sent.  Kyra Huntley LPN

## 2024-09-07 LAB — BACTERIA UR CULT: NORMAL

## 2024-09-20 ENCOUNTER — THERAPY VISIT (OUTPATIENT)
Dept: PHYSICAL THERAPY | Facility: CLINIC | Age: 64
End: 2024-09-20
Payer: COMMERCIAL

## 2024-09-20 DIAGNOSIS — M25.572 PAIN IN JOINT, ANKLE AND FOOT, LEFT: ICD-10-CM

## 2024-09-20 DIAGNOSIS — Z98.890 STATUS POST SURGERY: Primary | ICD-10-CM

## 2024-09-20 PROCEDURE — 97140 MANUAL THERAPY 1/> REGIONS: CPT | Mod: GP | Performed by: PHYSICAL THERAPIST

## 2024-09-20 PROCEDURE — 97110 THERAPEUTIC EXERCISES: CPT | Mod: GP | Performed by: PHYSICAL THERAPIST

## 2024-09-20 NOTE — PROGRESS NOTES
[facility]                                                                                   OUTPATIENT PHYSICAL THERAPY    PLAN OF TREATMENT FOR OUTPATIENT REHABILITATION   Patient's Last Name, First Name, Anderson Hicks YOB: 1960   Provider's Name   [facility]   Medical Record No.  [id number]     Onset Date: 12/27/23  Start of Care Date: 03/14/24     Medical Diagnosis:  L ankle pain s/p ankle surgery      PT Treatment Diagnosis:  L ankle pain s/p ankle surgery Plan of Treatment  Frequency/Duration: 1x/month/ 3 months    Certification date from 09/15/24 to 12/15/24         See note for plan of treatment details and functional goals     ALEX CHACON, PT                         I CERTIFY THE NEED FOR THESE SERVICES FURNISHED UNDER        THIS PLAN OF TREATMENT AND WHILE UNDER MY CARE     (Physician attestation of this document indicates review and certification of the therapy plan).              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 post op   Other pertinent information L subtalar fusion on 12/27/23   Progress Note/Certification   Start of Care Date 03/14/24   Onset of illness/injury or Date of Surgery 12/27/23   Therapy Frequency 1x/month   Predicted Duration 3 months   Certification date from 09/15/24   Certification date to 12/15/24   Progress Note Due Date 06/06/24   Progress Note Completed Date 03/14/24   GOALS   PT Goals 2;3   PT Goal 1   Goal Identifier muscle activation   Goal Description pt will be able to actively move ankle in  all 4 directions - DF, PF, inversion, eversion   Rationale to maximize safety and independence with performance of ADLs and functional tasks;to maximize safety and independence with self cares;to maximize safety and independence within the home;to maximize safety and independence within the community;to maximize safety and independence with transportation   Goal Progress Still limited severely but improving   Target Date 11/04/24   PT Goal 2   Goal Identifier ROM   Goal Description pt will have PROM WFL in order to be able to perform ADLs and reduce stiffness and pain   Rationale to maximize safety and independence with performance of ADLs and functional tasks;to maximize safety and independence within the home;to maximize safety and independence within the community;to maximize safety and independence with transportation;to maximize safety and independence with self cares   Goal Progress improving but still limited overall   Target Date 11/04/24   PT Goal 3   Goal Identifier ambulation   Goal Description pt will be able to ambulate 15 minutes with <2/10 pain and LRAD   Rationale to maximize safety and independence within the community;to maximize safety and independence with performance of ADLs and functional tasks;to maximize safety and independence within the home;to maximize safety and independence with self cares   Target Date 06/06/24   Date Met 04/25/24   Subjective Report   Subjective Report Patient hasn't been seen in a month, he feels like things are going ok, mobility is a little better. Noticing that with all the work on the L he is feeling R weakness as well, now getting R knee pain too. He can get up from a kneeling position better. He was crawling with a weight around his ankle really helped the strength with lifting of the hip.   Objective Measures   Objective Measures Objective Measure 1;Objective Measure 2   Objective Measure 1   Objective Measure MRI results per pt report:   Details Lateral  compartment:     Lateral meniscus: There is free edge fraying versus ill-defined free edge tear of the body of the lateral meniscus, new compared to previous MRI 8/27/2023. No unstable lateral meniscal tear is seen.     Cartilage: 1.5 x 1.5 cm area of full-thickness chondral loss over the posterior portion of the lateral tibial plateau with slight associated subchondral cystic changes and a 2.0 x 1.0 cm area of near full-thickness chondral loss over the posterior weightbearing portion of the lateral femoral condyle, new compared to previous MRI 8/27/2004.       Ligaments:     Anterior cruciate ligament: Intact.     Posterior cruciate ligament: Intact.     Medial collateral ligament: Intact.     Posterior oblique ligament: Intact.     Fibular collateral ligament: Intact.     Posterolateral corner:     The distal biceps femoris tendon, iliotibial band, popliteus tendon, popliteus muscle, popliteofibular ligament, and arcuate ligament are intact.     Extensor mechanism:     Patellar tendon: Intact.     Quadriceps tendon: Intact.   Objective Measure 2   Objective Measure Patient with minimal to foot/toe muscle activation with exercise   Details Feeling like proprioception is coming back, improved sensation at the ankle too   PT Modalities   PT Modalities Electrical Stimulation   Treatment Interventions (PT)   Interventions Therapeutic Procedure/Exercise;Manual Therapy;Self Care/Home Management;Therapeutic Activity;Neuromuscular Re-education;Gait Training   Therapeutic Procedure/Exercise   Therapeutic Procedures: strength, endurance, ROM, flexibility minutes (75344) 15   Therapeutic Procedures Ther Proc 2;Ther Proc 3;Ther Proc 4;Ther Proc 5;Ther Proc 6   Ther Proc 1 elevated gastroc heel raises   Ther Proc 1 - Details HEP   Ther Proc 2 seated heel raises with weights   Ther Proc 2 - Details HEP   Ther Proc 3 EDU on activity modification   Ther Proc 3 - Details lunges, running, getting back into his own workouts - what  workouts he has been doing on his own.   Ther Proc 4 EDU on R knee HEP as he is having increased P and weakness   PTRx Ther Proc 1 Long Sit Gastroc Stretch With Towel   PTRx Ther Proc 1 - Details HEP   PTRx Ther Proc 2 Seated Ankle Soleus Stretch With Towel    PTRx Ther Proc 2 - Details HEP   PTRx Ther Proc 3 Ankle Pumps in Long Sitting   PTRx Ther Proc 3 - Details HEP   PTRx Ther Proc 4 MLD Ankle   PTRx Ther Proc 4 - Details HEP   PTRx Ther Proc 5 Hip Flexion Straight Leg Raise   PTRx Ther Proc 5 - Details HEP   PTRx Ther Proc 6 Seated Knee Extension With Theraband   PTRx Ther Proc 6 - Details HEP   PTRx Ther Proc 7 Standing Hamstring Curl Knee Flexion   PTRx Ther Proc 7 - Details HEP   PTRx Ther Proc 8 Standing Weight Shift   PTRx Ther Proc 8 - Details HEP   PTRx Ther Proc 9 Knee Bends   PTRx Ther Proc 9 - Details HEP   PTRx Ther Proc 10 Standing Hip Flexion   PTRx Ther Proc 10 - Details HEP   PTRx Ther Proc 11 Hip AROM Standing Abduction   PTRx Ther Proc 11 - Details HEP   PTRx Ther Proc 12 Standing Alternating Plantarflexion and Dorsiflexion   PTRx Ther Proc 12 - Details HEP   Skilled Intervention strengthening for quads and ankle for knee pain and plantar fasciitis   Patient Response/Progress tolerated OK with verbalized understanding   Ther Proc 4 - Details quad set, SAQ, SLR and bridging - Cues to keep working on plantar fascia   Neuromuscular Re-education   Neuromuscular Re-education Neuro Re-ed 2;Neuro Re-ed 3;Neuro Re-ed 4;Neuro Re-ed 5   Manual Therapy   Manual Therapy: Mobilization, MFR, MLD, friction massage minutes (27624) 20   Manual Therapy 1 prone MFR to L medial>lateral achilles, distal gastroc and soleus, plantar fascia   Manual Therapy 1 - Details to improve mobility and decrease symptoms   Patient Response/Progress tolerated ok and felt better after session today, some pins and needles at ball of foot with manual tx   Manual Therapy 2 PROM with MET and overpressure with 5 sec hold at end range    Manual Therapy 2 - Details dorsiflexion and plantarflexion   Manual Therapy Manual Therapy 2   Education   Learner/Method Patient   Plan   Home program ptrx printout   Updates to plan of care as needed until feeling ready for independence   Plan for next session progress HEP as indicated   Total Session Time   Timed Code Treatment Minutes 35   Total Treatment Time (sum of timed and untimed services) 35

## 2024-10-02 ENCOUNTER — MEDICAL CORRESPONDENCE (OUTPATIENT)
Dept: HEALTH INFORMATION MANAGEMENT | Facility: CLINIC | Age: 64
End: 2024-10-02
Payer: COMMERCIAL

## 2024-10-04 ENCOUNTER — THERAPY VISIT (OUTPATIENT)
Dept: PHYSICAL THERAPY | Facility: CLINIC | Age: 64
End: 2024-10-04
Payer: COMMERCIAL

## 2024-10-04 DIAGNOSIS — Z98.890 STATUS POST SURGERY: Primary | ICD-10-CM

## 2024-10-04 DIAGNOSIS — M25.572 PAIN IN JOINT, ANKLE AND FOOT, LEFT: ICD-10-CM

## 2024-10-04 PROCEDURE — 97140 MANUAL THERAPY 1/> REGIONS: CPT | Mod: GP | Performed by: PHYSICAL THERAPIST

## 2024-10-04 PROCEDURE — 97110 THERAPEUTIC EXERCISES: CPT | Mod: GP | Performed by: PHYSICAL THERAPIST

## 2024-10-04 PROCEDURE — 97112 NEUROMUSCULAR REEDUCATION: CPT | Mod: GP | Performed by: PHYSICAL THERAPIST

## 2024-10-17 ENCOUNTER — THERAPY VISIT (OUTPATIENT)
Dept: PHYSICAL THERAPY | Facility: CLINIC | Age: 64
End: 2024-10-17
Payer: COMMERCIAL

## 2024-10-17 DIAGNOSIS — Z98.890 STATUS POST SURGERY: Primary | ICD-10-CM

## 2024-10-17 DIAGNOSIS — M25.572 PAIN IN JOINT, ANKLE AND FOOT, LEFT: ICD-10-CM

## 2024-10-17 PROCEDURE — 97140 MANUAL THERAPY 1/> REGIONS: CPT | Mod: GP | Performed by: PHYSICAL THERAPIST

## 2024-10-17 PROCEDURE — 97110 THERAPEUTIC EXERCISES: CPT | Mod: GP | Performed by: PHYSICAL THERAPIST

## 2024-10-31 ENCOUNTER — THERAPY VISIT (OUTPATIENT)
Dept: PHYSICAL THERAPY | Facility: CLINIC | Age: 64
End: 2024-10-31
Payer: COMMERCIAL

## 2024-10-31 DIAGNOSIS — M25.572 PAIN IN JOINT, ANKLE AND FOOT, LEFT: ICD-10-CM

## 2024-10-31 DIAGNOSIS — Z98.890 STATUS POST SURGERY: Primary | ICD-10-CM

## 2024-10-31 PROCEDURE — 97140 MANUAL THERAPY 1/> REGIONS: CPT | Mod: GP | Performed by: PHYSICAL THERAPIST

## 2024-10-31 PROCEDURE — 97530 THERAPEUTIC ACTIVITIES: CPT | Mod: GP | Performed by: PHYSICAL THERAPIST

## 2024-11-20 ENCOUNTER — LAB (OUTPATIENT)
Dept: INFUSION THERAPY | Facility: CLINIC | Age: 64
End: 2024-11-20
Attending: INTERNAL MEDICINE
Payer: COMMERCIAL

## 2024-11-20 DIAGNOSIS — D47.2 SMOLDERING MYELOMA: ICD-10-CM

## 2024-11-20 LAB
ANION GAP SERPL CALCULATED.3IONS-SCNC: 10 MMOL/L (ref 7–15)
BUN SERPL-MCNC: 14.8 MG/DL (ref 8–23)
CALCIUM SERPL-MCNC: 8.9 MG/DL (ref 8.8–10.4)
CHLORIDE SERPL-SCNC: 107 MMOL/L (ref 98–107)
CREAT SERPL-MCNC: 1.28 MG/DL (ref 0.67–1.17)
EGFRCR SERPLBLD CKD-EPI 2021: 63 ML/MIN/1.73M2
ERYTHROCYTE [DISTWIDTH] IN BLOOD BY AUTOMATED COUNT: 14.1 % (ref 10–15)
GLUCOSE SERPL-MCNC: 105 MG/DL (ref 70–99)
HCO3 SERPL-SCNC: 25 MMOL/L (ref 22–29)
HCT VFR BLD AUTO: 43.1 % (ref 40–53)
HGB BLD-MCNC: 15.8 G/DL (ref 13.3–17.7)
MCH RBC QN AUTO: 29 PG (ref 26.5–33)
MCHC RBC AUTO-ENTMCNC: 36.7 G/DL (ref 31.5–36.5)
MCV RBC AUTO: 79 FL (ref 78–100)
PLATELET # BLD AUTO: 215 10E3/UL (ref 150–450)
POTASSIUM SERPL-SCNC: 4.4 MMOL/L (ref 3.4–5.3)
RBC # BLD AUTO: 5.45 10E6/UL (ref 4.4–5.9)
SODIUM SERPL-SCNC: 142 MMOL/L (ref 135–145)
TOTAL PROTEIN SERUM FOR ELP: 6.8 G/DL (ref 6.4–8.3)
WBC # BLD AUTO: 4.6 10E3/UL (ref 4–11)

## 2024-11-20 PROCEDURE — 82374 ASSAY BLOOD CARBON DIOXIDE: CPT | Performed by: INTERNAL MEDICINE

## 2024-11-20 PROCEDURE — 83521 IG LIGHT CHAINS FREE EACH: CPT | Performed by: INTERNAL MEDICINE

## 2024-11-20 PROCEDURE — 36415 COLL VENOUS BLD VENIPUNCTURE: CPT

## 2024-11-20 PROCEDURE — 84165 PROTEIN E-PHORESIS SERUM: CPT | Mod: TC | Performed by: PATHOLOGY

## 2024-11-20 PROCEDURE — 82784 ASSAY IGA/IGD/IGG/IGM EACH: CPT | Performed by: INTERNAL MEDICINE

## 2024-11-20 PROCEDURE — 84155 ASSAY OF PROTEIN SERUM: CPT | Performed by: INTERNAL MEDICINE

## 2024-11-20 PROCEDURE — 80048 BASIC METABOLIC PNL TOTAL CA: CPT | Performed by: INTERNAL MEDICINE

## 2024-11-20 PROCEDURE — 85014 HEMATOCRIT: CPT | Performed by: INTERNAL MEDICINE

## 2024-11-20 NOTE — PROGRESS NOTES
Medical Assistant Note:  Anderson Mitchell presents today for lab draw.    Patient seen by provider today: No.   present during visit today: Not Applicable.    Concerns: No Concerns.    Procedure:  Lab draw site: LAC, Needle type: BF, Gauge: 21. Gauze and coban applied    Post Assessment:  Labs drawn without difficulty: Yes.    Discharge Plan:  Departure Mode: Ambulatory.    Face to Face Time: 5.    Ines Pinzon CMA

## 2024-11-21 LAB
IGG SERPL-MCNC: 1463 MG/DL (ref 610–1616)
KAPPA LC FREE SER-MCNC: 1.45 MG/DL (ref 0.33–1.94)
KAPPA LC FREE/LAMBDA FREE SER NEPH: 0.28 {RATIO} (ref 0.26–1.65)
LAMBDA LC FREE SERPL-MCNC: 5.21 MG/DL (ref 0.57–2.63)

## 2024-11-26 ENCOUNTER — ONCOLOGY VISIT (OUTPATIENT)
Dept: ONCOLOGY | Facility: CLINIC | Age: 64
End: 2024-11-26
Attending: INTERNAL MEDICINE
Payer: COMMERCIAL

## 2024-11-26 VITALS
HEART RATE: 67 BPM | WEIGHT: 220.6 LBS | RESPIRATION RATE: 16 BRPM | SYSTOLIC BLOOD PRESSURE: 150 MMHG | TEMPERATURE: 98.7 F | BODY MASS INDEX: 29.11 KG/M2 | OXYGEN SATURATION: 99 % | DIASTOLIC BLOOD PRESSURE: 83 MMHG

## 2024-11-26 DIAGNOSIS — D47.2 SMOLDERING MYELOMA: Primary | ICD-10-CM

## 2024-11-26 PROCEDURE — 99213 OFFICE O/P EST LOW 20 MIN: CPT | Performed by: INTERNAL MEDICINE

## 2024-11-26 PROCEDURE — G2211 COMPLEX E/M VISIT ADD ON: HCPCS | Performed by: INTERNAL MEDICINE

## 2024-11-26 PROCEDURE — G0463 HOSPITAL OUTPT CLINIC VISIT: HCPCS | Performed by: INTERNAL MEDICINE

## 2024-11-26 ASSESSMENT — PAIN SCALES - GENERAL: PAINLEVEL_OUTOF10: NO PAIN (0)

## 2024-11-26 NOTE — NURSING NOTE
"Oncology Rooming Note    November 26, 2024 2:46 PM   Anderson Mitchell is a 64 year old male who presents for:    Chief Complaint   Patient presents with    Oncology Clinic Visit     Initial Vitals: BP (!) 150/83   Pulse 67   Temp 98.7  F (37.1  C) (Oral)   Resp 16   Wt 100.1 kg (220 lb 9.6 oz)   SpO2 99%   BMI 29.11 kg/m   Estimated body mass index is 29.11 kg/m  as calculated from the following:    Height as of 12/27/23: 1.854 m (6' 0.99\").    Weight as of this encounter: 100.1 kg (220 lb 9.6 oz). Body surface area is 2.27 meters squared.  No Pain (0) Comment: Data Unavailable   No LMP for male patient.  Allergies reviewed: Yes  Medications reviewed: Yes    Medications: Medication refills not needed today.  Pharmacy name entered into [facility]: [id number] [address]    Frailty Screening:   Is the patient here for a new oncology consult visit in cancer care? 2. No      Clinical concerns: follow up        Annette Souza            "

## 2024-11-26 NOTE — LETTER
11/26/2024      Anderson Mitchell  [address]  [place] [address]      Dear Colleague,    Thank you for referring your patient, Anderson Mitchell, to the [facility] [place]. Please see a copy of my visit note below.    HEMATOLOGY HISTORY: Mr. Mitchell is a gentleman with smoldering myeloma.  -In 2008, EGD revealed a submucosal smooth nodule 5 cm near the antrum of the stomach. Had wedge gastrectomy on 05/05/2008.  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       1.  On 02/03/2021:  -Normal calcium and creatinine.  -Normal hemoglobin A1c.  -Normal TSH.     2.  Multiple labs done on 03/15/2021:    -Normal LFT.  -Normal copper.  -Normal zinc.  -Normal vitamin D.  -SPEP revealed monoclonal peak of 0.8.  -Serum immunofixation reveals monoclonal IgG lambda.  -IgG level of 1562.     3.  On 04/01/2021:  -Pease free light chain of 1.27 and lambda free light chain of 2.88. Normal ratio.  -Urine immunofixation reveals faint monoclonal IgG lambda.     4.  Multiple CBCs have been done in the past.  WBC, hemoglobin and platelet have been normal.  MCV has been mildly low around 77.  The patient carries a diagnosis of thalassemia trait.     5. Bone marrow biopsy on 07/01/2021 reveals 10% to 15% lambda monotypic plasma cell.  Bone marrow was 40% cellularity with trilineage hematopoiesis.  No amyloid deposits.  Flow cytometry reveals lambda monotypic plasma cells.  -FISH : Gain of 1q (1%), Gain of 11q (68%), Loss of 13q14 (35%). Monosomy 13 (15%) and rearrangement of IGH with (34%) or without (28%) gain of 5' (telomeric) signal   -46,XY[19]     6. Skeletal survey on 07/12/2021 revealed degenerative changes.  No lytic lesion or compression fracture.     SUBJECTIVE:  Mr. Mitchell is a 64-year-old gentleman with smoldering myeloma on observation.      He is doing well. He is very active and runs few miles daily. No headache.  No dizziness.  No chest pain. No shortness of breath.  No  abdominal pain, nausea or vomiting. He gets heartburn. No urinary or bowel complaints. No bone pain. He has mild peripheral neuropathy. He gets intermittent numbness in the fingers and the bottom of the feet. No fever or night sweats. All other review of systems negative.     PHYSICAL EXAMINATION:   GENERAL:  Alert and oriented x 3.   VITAL SIGNS:  Reviewed.  ECOG PS of 0.     Rest of the systems not examined.     LABS:  CBC, BMP, free light chain, IgG level and SPEP reviewed.     ASSESSMENT:    1.  A 64-year-old gentleman with smoldering myeloma. Stable.  2.  Peripheral neuropathy, stable.    3.  Mildly elevated creatinine.     PLAN:  -Labs every 6 months.  -Follow-up in 1 year with labs.    DISCUSSION:  1.  Patient is doing well.  He is asymptomatic.    Labs were reviewed with him.  Explained to him that his smoldering myeloma is stable.  We have been monitoring it for last 3 and half years and it has been remaining stable.    Explained to him that we will continue to monitor it.  He will have labs checked every 6 months.    2.  His creatinine is mildly elevated.  Advised him to increase his fluid intake.  Will recheck it in 6 months.    3.  I will see him in 1 year time with labs.  Advised him to call us with any questions or concerns.    Total visit time of 20 minutes.  Time spent in today's visit, review of chart/investigations today and documentation today.         Again, thank you for allowing me to participate in the care of your patient.        Sincerely,        Jackie Braden MD

## 2024-11-26 NOTE — PROGRESS NOTES
HEMATOLOGY HISTORY: Mr. Mitchell is a gentleman with smoldering myeloma.  -In 2008, EGD revealed a submucosal smooth nodule 5 cm near the antrum of the stomach. Had wedge gastrectomy on 05/05/2008.  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       1.  On 02/03/2021:  -Normal calcium and creatinine.  -Normal hemoglobin A1c.  -Normal TSH.     2.  Multiple labs done on 03/15/2021:    -Normal LFT.  -Normal copper.  -Normal zinc.  -Normal vitamin D.  -SPEP revealed monoclonal peak of 0.8.  -Serum immunofixation reveals monoclonal IgG lambda.  -IgG level of 1562.     3.  On 04/01/2021:  -Drasco free light chain of 1.27 and lambda free light chain of 2.88. Normal ratio.  -Urine immunofixation reveals faint monoclonal IgG lambda.     4.  Multiple CBCs have been done in the past.  WBC, hemoglobin and platelet have been normal.  MCV has been mildly low around 77.  The patient carries a diagnosis of thalassemia trait.     5. Bone marrow biopsy on 07/01/2021 reveals 10% to 15% lambda monotypic plasma cell.  Bone marrow was 40% cellularity with trilineage hematopoiesis.  No amyloid deposits.  Flow cytometry reveals lambda monotypic plasma cells.  -FISH : Gain of 1q (1%), Gain of 11q (68%), Loss of 13q14 (35%). Monosomy 13 (15%) and rearrangement of IGH with (34%) or without (28%) gain of 5' (telomeric) signal   -46,XY[19]     6. Skeletal survey on 07/12/2021 revealed degenerative changes.  No lytic lesion or compression fracture.     SUBJECTIVE:  Mr. Mitchell is a 64-year-old gentleman with smoldering myeloma on observation.      He is doing well. He is very active and runs few miles daily. No headache.  No dizziness.  No chest pain. No shortness of breath.  No abdominal pain, nausea or vomiting. He gets heartburn. No urinary or bowel complaints. No bone pain. He has mild peripheral neuropathy. He gets intermittent numbness in the fingers and the bottom of the feet. No fever or night sweats. All other review of systems  negative.     PHYSICAL EXAMINATION:   GENERAL:  Alert and oriented x 3.   VITAL SIGNS:  Reviewed.  ECOG PS of 0.     Rest of the systems not examined.     LABS:  CBC, BMP, free light chain, IgG level and SPEP reviewed.     ASSESSMENT:    1.  A 64-year-old gentleman with smoldering myeloma. Stable.  2.  Peripheral neuropathy, stable.    3.  Mildly elevated creatinine.     PLAN:  -Labs every 6 months.  -Follow-up in 1 year with labs.    DISCUSSION:  1.  Patient is doing well.  He is asymptomatic.    Labs were reviewed with him.  Explained to him that his smoldering myeloma is stable.  We have been monitoring it for last 3 and half years and it has been remaining stable.    Explained to him that we will continue to monitor it.  He will have labs checked every 6 months.    2.  His creatinine is mildly elevated.  Advised him to increase his fluid intake.  Will recheck it in 6 months.    3.  I will see him in 1 year time with labs.  Advised him to call us with any questions or concerns.    Total visit time of 20 minutes.  Time spent in today's visit, review of chart/investigations today and documentation today.

## 2024-12-10 ENCOUNTER — THERAPY VISIT (OUTPATIENT)
Dept: PHYSICAL THERAPY | Facility: CLINIC | Age: 64
End: 2024-12-10
Payer: COMMERCIAL

## 2024-12-10 DIAGNOSIS — Z98.890 STATUS POST SURGERY: Primary | ICD-10-CM

## 2024-12-10 DIAGNOSIS — M25.572 PAIN IN JOINT, ANKLE AND FOOT, LEFT: ICD-10-CM

## 2024-12-10 PROCEDURE — 97110 THERAPEUTIC EXERCISES: CPT | Mod: GP | Performed by: PHYSICAL THERAPIST

## 2024-12-30 ENCOUNTER — MYC MEDICAL ADVICE (OUTPATIENT)
Dept: ORTHOPEDICS | Facility: CLINIC | Age: 64
End: 2024-12-30
Payer: COMMERCIAL

## 2024-12-30 NOTE — TELEPHONE ENCOUNTER
Left Voicemail (1st Attempt) and Sent Mychart (1st Attempt) for the patient to call back and schedule the following:    Appointment type: Return Foot/Ankle  Provider:   Return date: Next Available

## 2024-12-31 ENCOUNTER — OFFICE VISIT (OUTPATIENT)
Dept: ORTHOPEDICS | Facility: CLINIC | Age: 64
End: 2024-12-31
Payer: COMMERCIAL

## 2024-12-31 DIAGNOSIS — M54.50 LUMBAR PAIN: Primary | ICD-10-CM

## 2024-12-31 DIAGNOSIS — M25.572 PAIN IN JOINT, ANKLE AND FOOT, LEFT: ICD-10-CM

## 2024-12-31 PROCEDURE — 99213 OFFICE O/P EST LOW 20 MIN: CPT | Performed by: ORTHOPAEDIC SURGERY

## 2024-12-31 NOTE — NURSING NOTE
Reason For Visit:   Chief Complaint   Patient presents with    RECHECK     Left ankle subtalar fusion 12/27/23           Pain Assessment  Patient Currently in Pain: Yes  Patient's Stated Pain Goal: 3    Jayna Tatum

## 2024-12-31 NOTE — LETTER
12/31/2024      Anderson Mitchell  [address]  [place] [address]      Dear Colleague,    Thank you for referring your patient, Anderson Mitchell, to the [facility] [place]. Please see a copy of my visit note below.    Chief complaint: Status post left subtalar joint arthrodesis performed December 27, 2023    Patient presents today for further follow-up he reports to have no pain but to have a sensation of burning and tightness across the foot.  He tries to stretch as much as possible in spite of that is happening.    He reports to have a history of back problems which he has addressed to his chiropractor.  Denies to have had any imaging studies of his lumbar spine.    And his exam he presented with grossly full range of motion of the ankle with limited subtalar motion which will be expected.  There is no signs of infection inflammation.    Assessment: Status post left subtalar joint arthrodesis with most likely neurogenic symptoms through the left foot    Plan: I discussed with the patient that at this point I would like to proceed with an MRI of the lumbar spine to rule out any type of radiculopathy.  Patient will be contacted with regards to the findings and the future plan.    In the meantime he has no restrictions.  I informed the patient that the symptoms that does not equal damage to his foot or spine.    All questions were answered.  Patient will follow-up accordingly.    TT 20 minutes      Again, thank you for allowing me to participate in the care of your patient.        Sincerely,        Ayush Hamilton MD    Electronically signed

## 2024-12-31 NOTE — PROGRESS NOTES
Chief complaint: Status post left subtalar joint arthrodesis performed December 27, 2023    Patient presents today for further follow-up he reports to have no pain but to have a sensation of burning and tightness across the foot.  He tries to stretch as much as possible in spite of that is happening.    He reports to have a history of back problems which he has addressed to his chiropractor.  Denies to have had any imaging studies of his lumbar spine.    And his exam he presented with grossly full range of motion of the ankle with limited subtalar motion which will be expected.  There is no signs of infection inflammation.    Assessment: Status post left subtalar joint arthrodesis with most likely neurogenic symptoms through the left foot    Plan: I discussed with the patient that at this point I would like to proceed with an MRI of the lumbar spine to rule out any type of radiculopathy.  Patient will be contacted with regards to the findings and the future plan.    In the meantime he has no restrictions.  I informed the patient that the symptoms that does not equal damage to his foot or spine.    All questions were answered.  Patient will follow-up accordingly.    TT 20 minutes

## 2025-01-06 ENCOUNTER — TELEPHONE (OUTPATIENT)
Dept: UROLOGY | Facility: CLINIC | Age: 65
End: 2025-01-06
Payer: COMMERCIAL

## 2025-01-06 NOTE — TELEPHONE ENCOUNTER
PA Initiation    Medication: TADALAFIL 5 MG PO TABS  Insurance Company: [employer] - Phone [phone] Fax [phone]  Pharmacy Filling the Rx: [facility] [id number] [place] [address] AT [facility]  Filling Pharmacy Phone: [phone]  Filling Pharmacy Fax:    Start Date: 1/6/2025  Retail Pharmacy Prior Authorization Team   Phone: [phone]

## 2025-01-06 NOTE — TELEPHONE ENCOUNTER
Prior Authorization Retail Medication Request    Medication/Dose: Tadalafil 5 mg daily  Diagnosis and ICD code (if different than what is on RX):  BPH  New/renewal/insurance change PA/secondary ins. PA:  Previously Tried and Failed:    Rationale:     Insurance   Primary: bc/tex GUZMAN  Insurance ID:      Secondary (if applicable):  Insurance ID:      Pharmacy Information (if different than what is on RX)  [facility]  Phone:  [phone]  Fax:[phone]    Clinic Information  Preferred routing pool for dept communication:

## 2025-01-07 ENCOUNTER — TRANSCRIBE ORDERS (OUTPATIENT)
Dept: OTHER | Age: 65
End: 2025-01-07

## 2025-01-07 DIAGNOSIS — M25.572 LEFT ANKLE PAIN: Primary | ICD-10-CM

## 2025-01-07 NOTE — TELEPHONE ENCOUNTER
Prior Authorization Approval    Medication: TADALAFIL 5 MG PO TABS  Authorization Effective Date: 1/1/2025  Authorization Expiration Date: 1/7/2026  Approved Dose/Quantity:   Reference #:     Insurance Company: [employer] - Phone [phone] Fax [phone]  Expected CoPay: $    CoPay Card Available:      Financial Assistance Needed: No  Which Pharm[facility] is filling the [facility] [id number] [place] [address] [facility]  Pharmacy Notified: Yes  Patient Notified: The pharmacy will notify the patient.

## 2025-01-09 ENCOUNTER — ANCILLARY PROCEDURE (OUTPATIENT)
Dept: MRI IMAGING | Facility: CLINIC | Age: 65
End: 2025-01-09
Attending: ORTHOPAEDIC SURGERY
Payer: COMMERCIAL

## 2025-01-09 DIAGNOSIS — M54.50 LUMBAR PAIN: ICD-10-CM

## 2025-01-09 PROCEDURE — 72148 MRI LUMBAR SPINE W/O DYE: CPT

## 2025-01-13 ENCOUNTER — TELEPHONE (OUTPATIENT)
Dept: ORTHOPEDICS | Facility: CLINIC | Age: 65
End: 2025-01-13
Payer: COMMERCIAL

## 2025-01-13 NOTE — TELEPHONE ENCOUNTER
Patient confirmed scheduled appointment:  Date: 2/3/2025  Time: 12:30pm  Visit type: Video Visit New  Provider: Adeline Ventura  Location: [facility]  Testing/imaging: MRI completed on 1/9/25

## 2025-03-27 ENCOUNTER — APPOINTMENT (OUTPATIENT)
Dept: ULTRASOUND IMAGING | Facility: CLINIC | Age: 65
End: 2025-03-27
Attending: EMERGENCY MEDICINE
Payer: COMMERCIAL

## 2025-03-27 ENCOUNTER — HOSPITAL ENCOUNTER (EMERGENCY)
Facility: CLINIC | Age: 65
Discharge: HOME | End: 2025-03-27
Attending: EMERGENCY MEDICINE
Payer: COMMERCIAL

## 2025-03-27 ENCOUNTER — APPOINTMENT (OUTPATIENT)
Dept: GENERAL RADIOLOGY | Facility: CLINIC | Age: 65
End: 2025-03-27
Attending: EMERGENCY MEDICINE
Payer: COMMERCIAL

## 2025-03-27 ENCOUNTER — TELEPHONE (OUTPATIENT)
Dept: FAMILY MEDICINE | Facility: CLINIC | Age: 65
End: 2025-03-27
Payer: COMMERCIAL

## 2025-03-27 VITALS
RESPIRATION RATE: 20 BRPM | TEMPERATURE: 99.3 F | SYSTOLIC BLOOD PRESSURE: 146 MMHG | HEART RATE: 85 BPM | OXYGEN SATURATION: 96 % | DIASTOLIC BLOOD PRESSURE: 74 MMHG

## 2025-03-27 DIAGNOSIS — N28.89 RENAL MASS: ICD-10-CM

## 2025-03-27 DIAGNOSIS — R17 ELEVATED BILIRUBIN: ICD-10-CM

## 2025-03-27 DIAGNOSIS — E87.6 HYPOKALEMIA: ICD-10-CM

## 2025-03-27 DIAGNOSIS — J18.9 PNEUMONIA OF LEFT LOWER LOBE DUE TO INFECTIOUS ORGANISM: ICD-10-CM

## 2025-03-27 DIAGNOSIS — K82.8 GALLBLADDER MASS: ICD-10-CM

## 2025-03-27 LAB
ALBUMIN SERPL BCG-MCNC: 3.4 G/DL (ref 3.5–5.2)
ALBUMIN UR-MCNC: 50 MG/DL
ALP SERPL-CCNC: 89 U/L (ref 40–150)
ALT SERPL W P-5'-P-CCNC: 44 U/L (ref 0–70)
ANION GAP SERPL CALCULATED.3IONS-SCNC: 11 MMOL/L (ref 7–15)
APPEARANCE UR: CLEAR
AST SERPL W P-5'-P-CCNC: 34 U/L (ref 0–45)
BASOPHILS # BLD AUTO: 0 10E3/UL (ref 0–0.2)
BASOPHILS NFR BLD AUTO: 0 %
BILIRUB SERPL-MCNC: 2.6 MG/DL
BILIRUB UR QL STRIP: NEGATIVE
BUN SERPL-MCNC: 24.6 MG/DL (ref 8–23)
CALCIUM SERPL-MCNC: 8.2 MG/DL (ref 8.8–10.4)
CHLORIDE SERPL-SCNC: 98 MMOL/L (ref 98–107)
COLOR UR AUTO: YELLOW
CREAT SERPL-MCNC: 1.46 MG/DL (ref 0.67–1.17)
EGFRCR SERPLBLD CKD-EPI 2021: 54 ML/MIN/1.73M2
EOSINOPHIL # BLD AUTO: 0 10E3/UL (ref 0–0.7)
EOSINOPHIL NFR BLD AUTO: 0 %
ERYTHROCYTE [DISTWIDTH] IN BLOOD BY AUTOMATED COUNT: 13.8 % (ref 10–15)
FLUAV RNA SPEC QL NAA+PROBE: NEGATIVE
FLUBV RNA RESP QL NAA+PROBE: NEGATIVE
GLUCOSE SERPL-MCNC: 113 MG/DL (ref 70–99)
GLUCOSE UR STRIP-MCNC: NEGATIVE MG/DL
HCO3 SERPL-SCNC: 28 MMOL/L (ref 22–29)
HCT VFR BLD AUTO: 36.3 % (ref 40–53)
HGB BLD-MCNC: 13.2 G/DL (ref 13.3–17.7)
HGB UR QL STRIP: ABNORMAL
HOLD SPECIMEN: NORMAL
IMM GRANULOCYTES # BLD: 0 10E3/UL
IMM GRANULOCYTES NFR BLD: 0 %
KETONES UR STRIP-MCNC: NEGATIVE MG/DL
LEUKOCYTE ESTERASE UR QL STRIP: NEGATIVE
LYMPHOCYTES # BLD AUTO: 0.4 10E3/UL (ref 0.8–5.3)
LYMPHOCYTES NFR BLD AUTO: 5 %
MCH RBC QN AUTO: 28.4 PG (ref 26.5–33)
MCHC RBC AUTO-ENTMCNC: 36.4 G/DL (ref 31.5–36.5)
MCV RBC AUTO: 78 FL (ref 78–100)
MONOCYTES # BLD AUTO: 0.2 10E3/UL (ref 0–1.3)
MONOCYTES NFR BLD AUTO: 2 %
MUCOUS THREADS #/AREA URNS LPF: PRESENT /LPF
NEUTROPHILS # BLD AUTO: 7.1 10E3/UL (ref 1.6–8.3)
NEUTROPHILS NFR BLD AUTO: 93 %
NITRATE UR QL: NEGATIVE
NRBC # BLD AUTO: 0 10E3/UL
NRBC BLD AUTO-RTO: 0 /100
PH UR STRIP: 6.5 [PH] (ref 5–7)
PLAT MORPH BLD: NORMAL
PLATELET # BLD AUTO: 159 10E3/UL (ref 150–450)
POTASSIUM SERPL-SCNC: 3.1 MMOL/L (ref 3.4–5.3)
PROT SERPL-MCNC: 6.7 G/DL (ref 6.4–8.3)
RBC # BLD AUTO: 4.65 10E6/UL (ref 4.4–5.9)
RBC MORPH BLD: NORMAL
RBC URINE: <1 /HPF
RSV RNA SPEC NAA+PROBE: NEGATIVE
SARS-COV-2 RNA RESP QL NAA+PROBE: NEGATIVE
SODIUM SERPL-SCNC: 137 MMOL/L (ref 135–145)
SP GR UR STRIP: 1.03 (ref 1–1.03)
SQUAMOUS EPITHELIAL: <1 /HPF
UROBILINOGEN UR STRIP-MCNC: >12 MG/DL
WBC # BLD AUTO: 7.7 10E3/UL (ref 4–11)
WBC URINE: 1 /HPF

## 2025-03-27 PROCEDURE — 80053 COMPREHEN METABOLIC PANEL: CPT | Performed by: EMERGENCY MEDICINE

## 2025-03-27 PROCEDURE — 87637 SARSCOV2&INF A&B&RSV AMP PRB: CPT | Performed by: EMERGENCY MEDICINE

## 2025-03-27 PROCEDURE — 76705 ECHO EXAM OF ABDOMEN: CPT

## 2025-03-27 PROCEDURE — 36415 COLL VENOUS BLD VENIPUNCTURE: CPT | Performed by: EMERGENCY MEDICINE

## 2025-03-27 PROCEDURE — 71046 X-RAY EXAM CHEST 2 VIEWS: CPT

## 2025-03-27 PROCEDURE — 85025 COMPLETE CBC W/AUTO DIFF WBC: CPT | Performed by: EMERGENCY MEDICINE

## 2025-03-27 PROCEDURE — 85014 HEMATOCRIT: CPT | Performed by: EMERGENCY MEDICINE

## 2025-03-27 PROCEDURE — 81003 URINALYSIS AUTO W/O SCOPE: CPT | Performed by: EMERGENCY MEDICINE

## 2025-03-27 PROCEDURE — 99284 EMERGENCY DEPT VISIT MOD MDM: CPT | Mod: 25

## 2025-03-27 PROCEDURE — 250N000013 HC RX MED GY IP 250 OP 250 PS 637: Performed by: EMERGENCY MEDICINE

## 2025-03-27 RX ORDER — AZITHROMYCIN 250 MG/1
TABLET, FILM COATED ORAL
Qty: 6 TABLET | Refills: 0 | Status: SHIPPED | OUTPATIENT
Start: 2025-03-27 | End: 2025-04-01

## 2025-03-27 RX ORDER — POTASSIUM CHLORIDE 1.5 G/1.58G
40 POWDER, FOR SOLUTION ORAL ONCE
Status: COMPLETED | OUTPATIENT
Start: 2025-03-27 | End: 2025-03-27

## 2025-03-27 RX ORDER — POTASSIUM CHLORIDE 7.45 MG/ML
10 INJECTION INTRAVENOUS ONCE
Status: DISCONTINUED | OUTPATIENT
Start: 2025-03-27 | End: 2025-03-27

## 2025-03-27 RX ADMIN — POTASSIUM CHLORIDE 40 MEQ: 1.5 POWDER, FOR SOLUTION ORAL at 13:09

## 2025-03-27 ASSESSMENT — ACTIVITIES OF DAILY LIVING (ADL)
ADLS_ACUITY_SCORE: 41

## 2025-03-27 NOTE — ED PROVIDER NOTES
Emergency Department Note      History of Present Illness     Chief Complaint   Cough and Hematuria    HPI   Anderson Mitchell is a 64 year old male with past medical history significant for GI stromal tumor and hypertension here for evaluation of a cough and hematuria. Patient reports onset of a cough three days ago. Two days ago he reports seeing some dark red blood in his urine. Denies bright red blood. No history kidney stones. No dysuria. No chest pain with the cough. No known recent sick contacts. Patient received his COVID and influenza vaccine this year.     Independent Historian   None    Review of External Notes   I reviewed the Family Medicine Triage Nurse Call from earlier today     Past Medical History     Medical History and Problem List   Generalized anxiety disorder   Arthritis  Chronic constipation   GI stromal tumor  Hypertension   Abdominal hernia  Obstructive sleep apnea   Mumps     Medications   Cialis   Hygroton     Surgical History   Abdominal hernia repair   Arthrodesis, left foot     Physical Exam     Patient Vitals for the past 24 hrs:   BP Temp Temp src Pulse Resp SpO2   03/27/25 1801 (!) 146/74 -- -- -- -- --   03/27/25 1311 128/66 -- -- 85 20 96 %   03/27/25 1100 (!) 160/72 99.3  F (37.4  C) Oral 82 20 94 %     Physical Exam  General: Alert, interactive   Head:  Scalp is atraumatic  Eyes:  No scleral icterus  ENT:      Nose:  The external nose is normal  Ears:  External ears are normal      Neck:  Normal range of motion.      There is no rigidity.    Trachea is in the midline         CV:  Regular rate and rhythm    No murmur   Resp:  Course breath sounds at the bilateral bases.       GI:  Abdomen is soft, no distension, no tenderness.     No CVA tenderness  MS:  Normal strength in all 4 extremities  Skin:  Warm and dry, No rash or lesions noted.  Neuro:   Strength 5/5 x4.   Psych: Awake. Alert.  Normal affect.      Appropriate interactions.    Diagnostics     Lab Results   Labs Ordered  and Resulted from Time of ED Arrival to Time of ED Departure   COMPREHENSIVE METABOLIC PANEL - Abnormal       Result Value    Sodium 137      Potassium 3.1 (*)     Carbon Dioxide (CO2) 28      Anion Gap 11      Urea Nitrogen 24.6 (*)     Creatinine 1.46 (*)     GFR Estimate 54 (*)     Calcium 8.2 (*)     Chloride 98      Glucose 113 (*)     Alkaline Phosphatase 89      AST 34      ALT 44      Protein Total 6.7      Albumin 3.4 (*)     Bilirubin Total 2.6 (*)    ROUTINE UA WITH MICROSCOPIC REFLEX TO CULTURE - Abnormal    Color Urine Yellow      Appearance Urine Clear      Glucose Urine Negative      Bilirubin Urine Negative      Ketones Urine Negative      Specific Gravity Urine 1.026      Blood Urine Trace (*)     pH Urine 6.5      Protein Albumin Urine 50 (*)     Urobilinogen Urine >12.0 (*)     Nitrite Urine Negative      Leukocyte Esterase Urine Negative      Mucus Urine Present (*)     RBC Urine <1      WBC Urine 1      Squamous Epithelials Urine <1     CBC WITH PLATELETS AND DIFFERENTIAL - Abnormal    WBC Count 7.7      RBC Count 4.65      Hemoglobin 13.2 (*)     Hematocrit 36.3 (*)     MCV 78      MCH 28.4      MCHC 36.4      RDW 13.8      Platelet Count 159      % Neutrophils 93      % Lymphocytes 5      % Monocytes 2      % Eosinophils 0      % Basophils 0      % Immature Granulocytes 0      NRBCs per 100 WBC 0      Absolute Neutrophils 7.1      Absolute Lymphocytes 0.4 (*)     Absolute Monocytes 0.2      Absolute Eosinophils 0.0      Absolute Basophils 0.0      Absolute Immature Granulocytes 0.0      Absolute NRBCs 0.0     INFLUENZA A/B, RSV AND SARS-COV2 PCR - Normal    Influenza A PCR Negative      Influenza B PCR Negative      RSV PCR Negative      SARS CoV2 PCR Negative     RBC AND PLATELET MORPHOLOGY    RBC Morphology Confirmed RBC Indices      Platelet Assessment        Value: Automated Count Confirmed. Platelet morphology is normal.       Imaging   XR Chest 2 Views   Final Result   IMPRESSION:        Dense left basilar consolidation, compatible with pneumonia. Consider follow-up in 6 weeks after treatment to document complete resolution.      Mild right basilar atelectasis. No focal airspace disease in the right lung. No pleural effusion or pneumothorax.      The cardiomediastinal silhouette is unremarkable.      Mild multilevel degenerative changes of the spine.      Left upper quadrant surgical clips.      US Abdomen Limited (RUQ)   Final Result   IMPRESSION:   1.  Ill-defined hypoechoic lesion is seen within the mid pole of the right kidney measuring up to 2.5 cm. Findings may reflect a prominent column of Milo but underlying mass cannot be excluded. Suggest further characterization with MRI exam.   2.  Ill-defined hypoechoic lesion is seen near the gallbladder fossa measuring up to 2.0 cm. Findings suspected to reflect focal fatty sparing but underlying mass cannot be excluded. This lesion can also be further characterized with the MRI exam    suggested above.               Independent Interpretation   CXR: left lower lobe infiltrate.    ED Course      Medications Administered   Medications   potassium chloride (KLOR-CON) Packet 40 mEq (40 mEq Oral $Given 3/27/25 1305)       Procedures   Procedures     Discussion of Management   None    ED Course   ED Course as of 03/27/25 1836   Thu Mar 27, 2025   1303 I obtained history and examined the patient as noted above.        Additional Documentation  None    Medical Decision Making / Diagnosis     CMS Diagnoses: None    MIPS       None    [facility]   Anderson Mitchell is a 64 year old male presenting with cough and dark urine.  Chest radiograph demonstrates findings consistent with pneumonia, this fits with his clinical scenario.  He is not hypoxic, there is no signs of severe sepsis or septic shock.  He has no pain to suggest pulmonary embolism or more concerning illness.  I will treat with antibiotics as noted below.  Patient feels comfortable with discharge to  home.  Patient does have what he relays is dark urine, his bilirubin is elevated and there is a potential mass noted near his gallbladder as well as on his kidney.  There is no signs of pyelonephritis or more concerning illness however I believe he should follow-up with his primary care provider to obtain an MRI as recommended by the radiologist.  I informed the patient of this and he was comfortable with this plan of action.  He was mildly hypokalemic and received p.o. replacement in the emergency department, he will follow-up with his primary care provider return the emergency department if new symptoms develop.    Disposition   The patient was discharged.     Diagnosis     ICD-10-CM    1. Pneumonia of left lower lobe due to infectious organism  J18.9       2. Renal mass  N28.89       3. Gallbladder mass  K82.8       4. Elevated bilirubin  R17       5. Hypokalemia  E87.6            Discharge Medications   Discharge Medication List as of 3/27/2025  6:05 PM        START taking these medications    Details   amoxicillin-clavulanate (AUGMENTIN) 875-125 MG tablet Take 1 tablet by mouth 2 times daily for 7 days., Disp-14 tablet, R-0, E-Prescribe      azithromycin (ZITHROMAX) 250 MG tablet Take 2 tablets (500 mg) by mouth daily for 1 day, THEN 1 tablet (250 mg) daily for 4 days., Disp-6 tablet, R-0, E-Prescribe               Scribe Disclosure:  I, Sue Celestin, am serving as a scribe at 1:00 PM on 3/27/2025 to document services personally performed by Amos Tolentino,* based on my observations and the provider's statements to me.        Amos Tolentino MD  03/27/25 3196

## 2025-03-27 NOTE — DISCHARGE INSTRUCTIONS
Discharge Instructions  Incidental Findings  There are incidental findings of masses in your gallbladder and kidney, you should closely follow-up with your primary care provider and have an outpatient MRI of these areas.  An incidental finding is something unexpected that was found while you were being treated and is felt to not be related to the reason that you came to the Emergency Department.  While this finding is not an emergency, you need to follow up with your primary provider (or occasionally a specialist) to determine if anything should be done about it.    These findings can come from:  Checking your vital signs (example: high blood pressure).  Taking your history (example: unexplained weight loss).  The physical exam (example: a heart murmur).  Laboratory study (example: anemia or low blood count).  X-rays/ultrasound/CT or other imaging (example: an unexplained mass).    Generally, every Emergency Department visit should have a follow-up clinic visit with either a primary or a specialty clinic/provider. Please follow-up as instructed by your emergency provider today.    Return to the Emergency Department if:  Your condition worsens.  You develop unexpected pain.  You now develop new symptoms or have new concerns.  If you were given a prescription for medicine here today, be sure to read all of the information (including the package insert) that comes with your prescription.  This will include important information about the medicine, its side effects, and any warnings that you need to know about.  The pharmacist who fills the prescription can provide more information and answer questions you may have about the medicine.  If you have questions or concerns that the pharmacist cannot address, please call or return to the Emergency Department.   Remember that you can always come back to the Emergency Department if you are not able to see your regular provider in the amount of time listed above, if you get any  new symptoms, or if there is anything that worries you.    (4) excellent

## 2025-03-27 NOTE — TELEPHONE ENCOUNTER
Pt called the clinic stating that he has a low grade fever, productive cough, wheezing, and hematuria.   He sees a PCP outside of the [facility] system who advised him to go to the ER.   Advised pt that this disposition is accurate and that he should proceed there now.   He verbalized understanding and agreement to plan.   Provided pt with [facility] ER address.   He will either drive himself there or have a friend bring him. Informed pt an ambulance would be appropriate if he cannot get there himself.     Thank you,  Darling Damon RN

## 2025-03-27 NOTE — ED TRIAGE NOTES
Pt c/oi cough and flu like symptoms starting Monday and hematuria starting Tuesday with mild R side flank pain, ABCD intact.       Triage Assessment (Adult)       Row Name 03/27/25 1151          Triage Assessment    Airway WDL WDL        Respiratory WDL    Respiratory WDL X;cough        Skin Circulation/Temperature WDL    Skin Circulation/Temperature WDL WDL        Cardiac WDL    Cardiac WDL X        Peripheral/Neurovascular WDL    Peripheral Neurovascular WDL WDL        Cognitive/Neuro/Behavioral WDL    Cognitive/Neuro/Behavioral WDL WDL

## 2025-04-12 ENCOUNTER — HEALTH MAINTENANCE LETTER (OUTPATIENT)
Age: 65
End: 2025-04-12

## 2025-04-15 ENCOUNTER — HOSPITAL ENCOUNTER (OUTPATIENT)
Dept: MRI IMAGING | Facility: CLINIC | Age: 65
Discharge: HOME | End: 2025-04-15
Attending: PHYSICIAN ASSISTANT | Admitting: PHYSICIAN ASSISTANT
Payer: COMMERCIAL

## 2025-04-15 DIAGNOSIS — N28.1 RENAL CYST: ICD-10-CM

## 2025-04-15 PROCEDURE — 74183 MRI ABD W/O CNTR FLWD CNTR: CPT

## 2025-04-15 PROCEDURE — 255N000002 HC RX 255 OP 636: Performed by: PHYSICIAN ASSISTANT

## 2025-04-15 PROCEDURE — A9585 GADOBUTROL INJECTION: HCPCS | Performed by: PHYSICIAN ASSISTANT

## 2025-04-15 RX ORDER — GADOBUTROL 604.72 MG/ML
10 INJECTION INTRAVENOUS ONCE
Status: COMPLETED | OUTPATIENT
Start: 2025-04-15 | End: 2025-04-15

## 2025-04-15 RX ADMIN — GADOBUTROL 10 ML: 604.72 INJECTION INTRAVENOUS at 11:15

## 2025-04-23 ENCOUNTER — VIRTUAL VISIT (OUTPATIENT)
Dept: UROLOGY | Facility: CLINIC | Age: 65
End: 2025-04-23
Payer: COMMERCIAL

## 2025-04-23 DIAGNOSIS — N13.8 BPH WITH OBSTRUCTION/LOWER URINARY TRACT SYMPTOMS: Primary | ICD-10-CM

## 2025-04-23 DIAGNOSIS — N40.1 BPH WITH OBSTRUCTION/LOWER URINARY TRACT SYMPTOMS: Primary | ICD-10-CM

## 2025-04-23 PROCEDURE — 98005 SYNCH AUDIO-VIDEO EST LOW 20: CPT | Performed by: PHYSICIAN ASSISTANT

## 2025-04-23 RX ORDER — TADALAFIL 5 MG/1
5 TABLET ORAL EVERY 24 HOURS
Qty: 90 TABLET | Refills: 4 | Status: SHIPPED | OUTPATIENT
Start: 2025-04-23

## 2025-04-23 NOTE — NURSING NOTE
Current patient location: [address] [place] [address] [address]  [facility] [address]    Is the patient currently in the state of [place]? YES    Visit mode: VIDEO    If the visit is dropped, the patient can be reconnected by:VIDEO VISIT: Text to cell phone:   Telephone Information:   Mobile [phone]       Will anyone else be joining the visit? NO  (If patient encounters technical issues they should call [phone]    Are changes needed to the allergy or medication list? No and Pt stated no med changes    Are refills needed on medications prescribed by this physician? NO    Rooming Documentation:  Not applicable    Reason for visit: KIEL UGALDE

## 2025-04-23 NOTE — LETTER
"4/23/2025       RE: Anderson Mitchell  [address]  [place] [address]     Dear Colleague,    Thank you for referring your patient, Anderosn Mitchell, to the [facility] at [facility]. Please see a copy of my visit note below.    Video-Visit Details    Type of service:  Video Visit     Originating Location (pt. Location):Home    Distant Location (provider location):  On-site  Platform used for Video Visit: AmWell  Start time: 2:05 pm  End time: 2:20pm    CC: ED, LUTS, left renal lesion/Bosniak IIF cyst    HPI:  Anderson \"Charan العلي\" Paula is a pleasant 63 year old male who presents in follow-up of the above.   Initially seen 3/27/23:  Has urgency (georgina in AM), nocturia x 1, stream is strong, not interrupted, minor post void dribbling, no dysuria, sense of very mild pressure at end of penis. No hematuria.     PSA done through [employer] and not avail for review but per pt report was normal.      Had been on Flomax for a year. Has tried sildenafil 50mg (gets a bad headache). Not a full erection with this dose.     Saw Dr. Vivas in 2020 due to complex renal lesion (7 mm left renal inferior pole nonsimple cyst) and was to have a renal MRI in 6 mo follow-up. Pt does not recall this recommendation.     6/19/23  Started on cialis 5mg daily. This has improved his urinary symptoms and some erectile function (although not firm enough).   Renal MRI in April: small nodule with heterogeneous signal along the lower left kidney is again noted but does not appear to show enhancement. Dr. Vivas rec repeat MRI in 1 year.      4/15/24  Had been on Cialis 5-10mg daily. Developed penile pain post ankle surgery and stopped this. Has noted worsening urinary stream while off. Pain has resolved. Going to restart this soon.   T-level normal 9/27/23  Renal MRI stable.     TODAY 4/23/25  Renal MRI noted reduced size in renal lesion we have been " following.   Taking Cilais 5mg daily with good erectile function and   Was in the ED for PNA and noted to have bilirubin in his urine. Upon review, no hematuria.     EXAM: MR RENAL W/O and W CONTRAST  LOCATION: [facility]  DATE: 3/14/2024     INDICATION:  Kidney lesion, native, left  COMPARISON: 04/10/2023  TECHNIQUE: Routine MRI renal protocol including T1 in/out phase, diffusion, multiplane T2, and dynamic T1 with IV contrast.  CONTRAST: 10ml Gadavist     FINDINGS:     RIGHT KIDNEY: 8 mm T2 hyperintense cystic lesion is seen along the inferior pole of the right kidney and was not noted previously. No hydronephrosis is present.     LEFT KIDNEY: Stable size of 8 mm T2 hyperintense cystic lesion along the superior pole of the left kidney. A few thin internal septations are present which were less prominent on the prior exam. 8 mm T1 hyperintense lesion along the inferior pole of the   left kidney also appears unchanged in size with minimally thickened septation measuring up to 3 mm noted on the postcontrast subtraction weighted images (series 10, image 20).     ADDITIONAL FINDINGS: Mild, diffuse hepatic steatosis. Stable appearance of 7 mm cystic lesion along the right hepatic lobe. No intrahepatic or extra hepatic biliary duct dilatation is seen. Gallbladder is unremarkable. The spleen and adrenal glands are   unremarkable. No intra-abdominal lymphadenopathy. No suspicious osseous lesions are seen.                                                                      IMPRESSION:  1.  Stable appearance of complex left renal cysts including an 8 mm Bosniak class II cyst along the upper pole as well as a 7 mm Bosniak class II F cyst along the lower pole. Suggest 6-12 month follow-up exam.  2.  Mild, diffuse hepatic steatosis.     Bosniak MRI characterization     Class I: Well-defined, thin (<= 2mm width) smooth wall; homogenous simple fluid (signal intensity similar to CSF); no septa or  calcifications; the wall may enhance     Class II: Three types, all well-defined with thin (<=2 mm) smooth walls;  -Cystic masses with thin (<= 2 mm) and few (1-3) enhancing septa; any nonenhancing septa; may have calcification of any type  -Homogeneous masses markedly hyperintense at T2-weighted imaging (similar to CSF) at noncontrast MRI  -Homogeneous masses markedly hyperintense at T1-weighted imaging (approximately 2.5 times normal parenchymal signal intensity at noncontrast MRI)     Class IIF: Two types:  -Cystic masses with a smooth minimally thickened (3 mm) enhancing wall, or smooth minimal thickening of one or more enhancing septa, or many (>=4 in number) smooth thin (<=2mm width) enhancing septa  -Cystic masses that are heterogeneously hyperintense on T1-weighted fat-saturated non-contrast imaging     Class III: One or more enhancing thick (>= 4 mm width) or enhancing irregular walls or septa. (Irregular defined as displaying 3mm or smaller obtusely marginated convex protrusions)     Class IV: One or more enhancing nodules (>= 4mm convex protrusion with obtuse margins or a convex protrusion of any size with acute margins)     Bosniak Classification of Cystic Renal Masses, Version 2019: An Update Proposal and Needs Assessment. Cassy SG et al. Radiology 2019.    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/2008     Hernia, abdominal      Hypertension      Malignant neoplasm (H)     gastrointestinal mass-removed 5-2008     Mumps      Sleep apnea      Thalassaemia trait 05/07/2013       Past Surgical History:   Procedure Laterality Date     ABDOMEN SURGERY       ARTHRODESIS FOOT Left 12/27/2023    Procedure: Left Subtalar Joint Fusion and Partial Fibula Excision;  Surgeon: Ayush Hamilton MD;   Location: [facility]     BONE MARROW BIOPSY, BONE SPECIMEN, NEEDLE/TROCAR N/A 07/01/2021    Procedure: BONE MARROW BIOPSY;  Surgeon: Ivette Hassan MD;  Location:  GI     COLONOSCOPY  04/06/2012    Procedure:COLONOSCOPY; Surgeon:TAMIE REYNA; Location: GI     COLONOSCOPY N/A 9/1/2023    Procedure: Colonoscopy;  Surgeon: Rico Horner MD;  Location:  GI     ESOPHAGOSCOPY, GASTROSCOPY, DUODENOSCOPY (EGD), COMBINED N/A 07/30/2020    Procedure: ESOPHAGOGASTRODUODENOSCOPY, WITH BIOPSY;  Surgeon: Tejas Bradley MD;  [facility]     HERNIA REPAIR       ORTHOPEDIC SURGERY         Social History     Socioeconomic History     Marital status: Single     Spouse name: Not on file     Number of children: Not on file     Years of education: Not on file     Highest education level: Not on file   Occupational History     Not on file   Tobacco Use     Smoking status: Former     Current packs/day: 0.00     Types: Cigars, Cigarettes     Smokeless tobacco: Never     Tobacco comments:     OCC. CIGAR   Vaping Use     Vaping status: Never Used   Substance and Sexual Activity     Alcohol use: Yes     Alcohol/week: 0.0 standard drinks of alcohol     Comment: 2 a week     Drug use: No     Sexual activity: Never   Other Topics Concern     Parent/sibling w/ CABG, MI or angioplasty before 65F 55M? Not Asked   Social History Narrative     Not on file     Social Drivers of Health     Financial Resource Strain: Not on file   Food Insecurity: Not on file   Transportation Needs: Not on file   Physical Activity: Not on file   Stress: Not on file   Social Connections: Not on file   Interpersonal Safety: Not on file   Housing Stability: Not on file       Family History   Problem Relation Age of Onset     Hypertension Father         Descease     Respiratory Father         YUNG     Cerebrovascular Disease Father         ,     C.A.D. Father      Respiratory Brother         YUNG     Hypertension Brother      Thyroid  Disease Mother         descease     Hypertension Brother        Allergies   Allergen Reactions     Blueberry Flavoring Agent (Non-Screening)      Cats Itching and Other (See Comments)     Grass      Lactose      Nkda [No Known Drug Allergy]      No Clinical Screening - See Comments Other (See Comments)     Nuts Itching     Trees        Current Outpatient Medications   Medication Sig Dispense Refill     cetirizine (ZYRTEC) 10 MG tablet Take 10 mg by mouth daily       chlorthalidone (HYGROTON) 25 MG tablet Take 1 tablet by mouth daily at 2 pm       fluticasone (FLONASE) 50 MCG/ACT nasal spray        Multiple Vitamin (MULTIVITAMIN) per tablet Take 1 tablet by mouth daily.       tadalafil (CIALIS) 5 MG tablet TAKE 1 TABLET (5 MG) BY MOUTH EVERY 24 HOURS 90 tablet 3     VERAPAMIL HCL PO Take 240 mg by mouth        No current facility-administered medications for this visit.         PEx:   PSYCH: NAD  EYES: EOMI  MOUTH: MMM  NEURO: AAO x3      A/P: Anderson Mitchell is a 63 year old male with BPH w/ LUTS, ED, renal lesion.   -No longer needs routine renal imaging.   -Refilled Cilais 5mg daily, if room for improvement, can increase to 10mg daily at 1 week.     Divya Valdes PA-C  [facility]        20 minutes spent on the date of the encounter doing chart review, review of outside records, review of test results, interpretation of tests, patient visit and documentation                 Again, thank you for allowing me to participate in the care of your patient.      Sincerely,    Divya Valdes PA-C, [place]

## 2025-04-23 NOTE — PROGRESS NOTES
"Video-Visit Details    Type of service:  Video Visit     Originating Location (pt. Location):Home    Distant Location (provider location):  On-site  Platform used for Video Visit: Charles  Start time: 2:05 pm  End time: 2:20pm    CC: ED, LUTS, left renal lesion/Bosniak IIF cyst    HPI:  Anderson \"Charan العلي\" Paula is a pleasant 63 year old male who presents in follow-up of the above.   Initially seen 3/27/23:  Has urgency (georgina in AM), nocturia x 1, stream is strong, not interrupted, minor post void dribbling, no dysuria, sense of very mild pressure at end of penis. No hematuria.     PSA done through [facility] and not avail for review but per pt report was normal.      Had been on Flomax for a year. Has tried sildenafil 50mg (gets a bad headache). Not a full erection with this dose.     Saw Dr. Vivas in 2020 due to complex renal lesion (7 mm left renal inferior pole nonsimple cyst) and was to have a renal MRI in 6 mo follow-up. Pt does not recall this recommendation.     6/19/23  Started on cialis 5mg daily. This has improved his urinary symptoms and some erectile function (although not firm enough).   Renal MRI in April: small nodule with heterogeneous signal along the lower left kidney is again noted but does not appear to show enhancement. Dr. Vivas rec repeat MRI in 1 year.      4/15/24  Had been on Cialis 5-10mg daily. Developed penile pain post ankle surgery and stopped this. Has noted worsening urinary stream while off. Pain has resolved. Going to restart this soon.   T-level normal 9/27/23  Renal MRI stable.     TODAY 4/23/25  Renal MRI noted reduced size in renal lesion we have been following.   Taking Cilais 5mg daily with good erectile function and   Was in the ED for PNA and noted to have bilirubin in his urine. Upon review, no hematuria.     EXAM: MR RENAL W/O and W CONTRAST  LOCATION: [facility]  DATE: 3/14/2024     INDICATION:  Kidney lesion, native, left  COMPARISON: " 04/10/2023  TECHNIQUE: Routine MRI renal protocol including T1 in/out phase, diffusion, multiplane T2, and dynamic T1 with IV contrast.  CONTRAST: 10ml Gadavist     FINDINGS:     RIGHT KIDNEY: 8 mm T2 hyperintense cystic lesion is seen along the inferior pole of the right kidney and was not noted previously. No hydronephrosis is present.     LEFT KIDNEY: Stable size of 8 mm T2 hyperintense cystic lesion along the superior pole of the left kidney. A few thin internal septations are present which were less prominent on the prior exam. 8 mm T1 hyperintense lesion along the inferior pole of the   left kidney also appears unchanged in size with minimally thickened septation measuring up to 3 mm noted on the postcontrast subtraction weighted images (series 10, image 20).     ADDITIONAL FINDINGS: Mild, diffuse hepatic steatosis. Stable appearance of 7 mm cystic lesion along the right hepatic lobe. No intrahepatic or extra hepatic biliary duct dilatation is seen. Gallbladder is unremarkable. The spleen and adrenal glands are   unremarkable. No intra-abdominal lymphadenopathy. No suspicious osseous lesions are seen.                                                                      IMPRESSION:  1.  Stable appearance of complex left renal cysts including an 8 mm Bosniak class II cyst along the upper pole as well as a 7 mm Bosniak class II F cyst along the lower pole. Suggest 6-12 month follow-up exam.  2.  Mild, diffuse hepatic steatosis.     Bosniak MRI characterization     Class I: Well-defined, thin (<= 2mm width) smooth wall; homogenous simple fluid (signal intensity similar to CSF); no septa or calcifications; the wall may enhance     Class II: Three types, all well-defined with thin (<=2 mm) smooth walls;  -Cystic masses with thin (<= 2 mm) and few (1-3) enhancing septa; any nonenhancing septa; may have calcification of any type  -Homogeneous masses markedly hyperintense at T2-weighted imaging (similar to CSF) at  noncontrast MRI  -Homogeneous masses markedly hyperintense at T1-weighted imaging (approximately 2.5 times normal parenchymal signal intensity at noncontrast MRI)     Class IIF: Two types:  -Cystic masses with a smooth minimally thickened (3 mm) enhancing wall, or smooth minimal thickening of one or more enhancing septa, or many (>=4 in number) smooth thin (<=2mm width) enhancing septa  -Cystic masses that are heterogeneously hyperintense on T1-weighted fat-saturated non-contrast imaging     Class III: One or more enhancing thick (>= 4 mm width) or enhancing irregular walls or septa. (Irregular defined as displaying 3mm or smaller obtusely marginated convex protrusions)     Class IV: One or more enhancing nodules (>= 4mm convex protrusion with obtuse margins or a convex protrusion of any size with acute margins)     Bosniak Classification of Cystic Renal Masses, Version 2019: An Update Proposal and Needs Assessment. Cassy SG et al. Radiology 2019.    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    Hernia, abdominal     Hypertension     Malignant neoplasm (H)     gastrointestinal mass-removed 5-2008    Mumps     Sleep apnea     Thalassaemia trait 05/07/2013       Past Surgical History:   Procedure Laterality Date    ABDOMEN SURGERY      ARTHRODESIS FOOT Left 12/27/2023    Procedure: Left Subtalar Joint Fusion and Partial Fibula Excision;  Surgeon: Ayush Hamilton MD;  Location: [facility] OR    BONE MARROW BIOPSY, BONE SPECIMEN, NEEDLE/TROCAR N/A 07/01/2021    Procedure: BONE MARROW BIOPSY;  Surgeon: Ivette Hassan MD;  Location:  GI    COLONOSCOPY  04/06/2012    Procedure:COLONOSCOPY; Surgeon:TAMIE REYNA; Location:[facility]    COLONOSCOPY N/A 9/1/2023    Procedure: Colonoscopy;  Surgeon:  Rico Horner MD;  Location:  GI    ESOPHAGOSCOPY, GASTROSCOPY, DUODENOSCOPY (EGD), COMBINED N/A 07/30/2020    Procedure: ESOPHAGOGASTRODUODENOSCOPY, WITH BIOPSY;  Surgeon: Tejas Bradley MD;  [facility]    HERNIA REPAIR      ORTHOPEDIC SURGERY         Social History     Socioeconomic History    Marital status: Single     Spouse name: Not on file    Number of children: Not on file    Years of education: Not on file    Highest education level: Not on file   Occupational History    Not on file   Tobacco Use    Smoking status: Former     Current packs/day: 0.00     Types: Cigars, Cigarettes    Smokeless tobacco: Never    Tobacco comments:     OCC. CIGAR   Vaping Use    Vaping status: Never Used   Substance and Sexual Activity    Alcohol use: Yes     Alcohol/week: 0.0 standard drinks of alcohol     Comment: 2 a week    Drug use: No    Sexual activity: Never   Other Topics Concern    Parent/sibling w/ CABG, MI or angioplasty before 65F 55M? Not Asked   Social History Narrative    Not on file     Social Drivers of Health     Financial Resource Strain: Not on file   Food Insecurity: Not on file   Transportation Needs: Not on file   Physical Activity: Not on file   Stress: Not on file   Social Connections: Not on file   Interpersonal Safety: Not on file   Housing Stability: Not on file       Family History   Problem Relation Age of Onset    Hypertension Father         Descease    Respiratory Father         YUNG    Cerebrovascular Disease Father         ,    C.A.D. Father     Respiratory Brother         YUNG    Hypertension Brother     Thyroid Disease Mother         descease    Hypertension Brother        Allergies   Allergen Reactions    Blueberry Flavoring Agent (Non-Screening)     Cats Itching and Other (See Comments)    Grass     Lactose     Nkda [No Known Drug Allergy]     No Clinical Screening - See Comments Other (See Comments)    Nuts Itching    Trees        Current Outpatient Medications   Medication  Sig Dispense Refill    cetirizine (ZYRTEC) 10 MG tablet Take 10 mg by mouth daily      chlorthalidone (HYGROTON) 25 MG tablet Take 1 tablet by mouth daily at 2 pm      fluticasone (FLONASE) 50 MCG/ACT nasal spray       Multiple Vitamin (MULTIVITAMIN) per tablet Take 1 tablet by mouth daily.      tadalafil (CIALIS) 5 MG tablet TAKE 1 TABLET (5 MG) BY MOUTH EVERY 24 HOURS 90 tablet 3    VERAPAMIL HCL PO Take 240 mg by mouth        No current facility-administered medications for this visit.         PEx:   PSYCH: NAD  EYES: EOMI  MOUTH: MMM  NEURO: AAO x3      A/P: Anderson Mitchell is a 63 year old male with BPH w/ LUTS, ED, renal lesion.   -No longer needs routine renal imaging.   -Refilled Cilais 5mg daily, if room for improvement, can increase to 10mg daily at 1 week.     Divya Valdes PA-C  [facility]        20 minutes spent on the date of the encounter doing chart review, review of outside records, review of test results, interpretation of tests, patient visit and documentation

## 2025-06-02 ENCOUNTER — TELEPHONE (OUTPATIENT)
Dept: ONCOLOGY | Facility: CLINIC | Age: 65
End: 2025-06-02
Payer: COMMERCIAL

## 2025-06-02 NOTE — TELEPHONE ENCOUNTER
Lvm for patient to schedule 2 labs & follow up with Dr. Sampson Victoria on 6/2/2025 at 10:55 AM

## 2025-06-12 ENCOUNTER — TELEPHONE (OUTPATIENT)
Dept: ONCOLOGY | Facility: CLINIC | Age: 65
End: 2025-06-12
Payer: COMMERCIAL

## 2025-06-12 NOTE — TELEPHONE ENCOUNTER
Called patient and left message to call back. Patient is coming due to sched follow up with provider with labs prior.

## 2025-06-12 NOTE — TELEPHONE ENCOUNTER
Spoke with patient and got his yr follow up with labs scheduled but patient declined to schedule the 6mo lab work    Ai Victoria on 6/12/2025 at 11:36 AM

## 2025-06-14 ENCOUNTER — HEALTH MAINTENANCE LETTER (OUTPATIENT)
Age: 65
End: 2025-06-14

## 2025-06-29 DIAGNOSIS — N13.8 BPH WITH OBSTRUCTION/LOWER URINARY TRACT SYMPTOMS: ICD-10-CM

## 2025-06-29 DIAGNOSIS — N40.1 BPH WITH OBSTRUCTION/LOWER URINARY TRACT SYMPTOMS: ICD-10-CM

## 2025-06-29 DIAGNOSIS — N52.01 ERECTILE DYSFUNCTION DUE TO ARTERIAL INSUFFICIENCY: ICD-10-CM

## 2025-06-29 RX ORDER — TADALAFIL 5 MG/1
5-10 TABLET ORAL EVERY 24 HOURS
Qty: 90 TABLET | Refills: 3 | Status: SHIPPED | OUTPATIENT
Start: 2025-06-29

## 2025-07-02 ENCOUNTER — TELEPHONE (OUTPATIENT)
Dept: UROLOGY | Facility: CLINIC | Age: 65
End: 2025-07-02
Payer: COMMERCIAL

## 2025-07-02 NOTE — TELEPHONE ENCOUNTER
Health Call Center    Phone Message    May a detailed message be left on voicemail: yes     Reason for Call: Medication Question or concern regarding medication   Prescription Clarification  Name of Medication: Tadalfil 5mg  Prescribing Provider: Divya Valdes PA-C    Pharmacy: [id number]   What on the order needs clarification? Pharmacist is calling about a possible drug interaction with Verapamil from a different prescriber. Pharmacy is requesting a call back. Patient is reporting that he is out of the medication.    Action Taken: Message routed to:  Other: Urology    Travel Screening: Not Applicable

## 2025-07-08 ENCOUNTER — TELEPHONE (OUTPATIENT)
Dept: UROLOGY | Facility: CLINIC | Age: 65
End: 2025-07-08
Payer: COMMERCIAL

## 2025-07-08 DIAGNOSIS — N52.9 ED (ERECTILE DYSFUNCTION): Primary | ICD-10-CM

## 2025-07-08 DIAGNOSIS — N52.9 ED (ERECTILE DYSFUNCTION): ICD-10-CM

## 2025-07-08 RX ORDER — TADALAFIL 5 MG/1
5 TABLET ORAL EVERY 24 HOURS
Qty: 90 TABLET | Refills: 2 | Status: SHIPPED | OUTPATIENT
Start: 2025-07-08 | End: 2025-07-08

## 2025-07-08 RX ORDER — TADALAFIL 5 MG/1
10 TABLET ORAL EVERY 24 HOURS
Qty: 90 TABLET | Refills: 2 | Status: SHIPPED | OUTPATIENT
Start: 2025-07-08

## (undated) DEVICE — SYR 30ML SLIP TIP W/O NDL 302833

## (undated) DEVICE — Device

## (undated) DEVICE — SUTURE VICRYL+ 2-0 CT-2 27" UND VCP269H

## (undated) DEVICE — CAST FIBERGLASS 4" ROLL WHITE 73458-00003-00

## (undated) DEVICE — ENDO FORCEP ENDOJAW BIOPSY 2.8MMX160CM FB-220K

## (undated) DEVICE — STPL SKIN 35W ROTATING HEAD PRW35

## (undated) DEVICE — SPECIMEN CONTAINER 60MLW/10% FORMALIN 59601

## (undated) DEVICE — SUCTION MANIFOLD NEPTUNE 2 SYS 1 PORT 702-025-000

## (undated) DEVICE — SUCTION CATH AIRLIFE TRI-FLO W/CONTROL PORT 14FR  T60C

## (undated) DEVICE — GOWN IMPERVIOUS 2XL BLUE

## (undated) DEVICE — SUCTION MANIFOLD NEPTUNE 2 SYS 4 PORT 0702-020-000

## (undated) DEVICE — PEN MARKING SKIN

## (undated) DEVICE — SYR 10ML FINGER CONTROL W/O NDL 309695

## (undated) DEVICE — LINEN ORTHO PACK 5446

## (undated) DEVICE — PACK LOWER EXTREMITY RIVERSIDE SOP32LEFSX

## (undated) DEVICE — GOWN XLG DISP 9545

## (undated) DEVICE — STRAP KNEE/BODY 31143004

## (undated) DEVICE — SPECIMEN CONTAINER 3OZ W/FORMALIN 59901

## (undated) DEVICE — SOL WATER IRRIG 1000ML BOTTLE 2F7114

## (undated) DEVICE — SYR 30ML LL W/O NDL 302832

## (undated) DEVICE — ENDO BITE BLOCK ADULT OMNI-BLOC

## (undated) DEVICE — KIT ENDO TURNOVER/PROCEDURE CARRY-ON 101822

## (undated) DEVICE — CAST PADDING 4" UNSTERILE 9044

## (undated) DEVICE — SOL NACL 0.9% IRRIG 1000ML BOTTLE 2F7124

## (undated) DEVICE — DEVICE RETRIEVAL ROTH NET PLATINUM UNIV 2.5MMX230CM 00715050

## (undated) DEVICE — DRAPE C-ARM MINI 54X85" 07-CA600

## (undated) DEVICE — IMM LEG ELEVATOR 79-90191

## (undated) DEVICE — GLOVE BIOGEL PI MICRO INDICATOR UNDERGLOVE SZ 8.0 48980

## (undated) DEVICE — SU ETHILON 3-0 PS-1 18" 1663H

## (undated) DEVICE — KIT ENDO FIRST STEP DISINFECTANT 200ML W/POUCH EP-4

## (undated) DEVICE — ESU GROUND PAD ADULT W/CORD E7507

## (undated) DEVICE — GLOVE BIOGEL PI ULTRATOUCH G SZ 7.5 42175

## (undated) RX ORDER — LIDOCAINE HYDROCHLORIDE 10 MG/ML
INJECTION, SOLUTION EPIDURAL; INFILTRATION; INTRACAUDAL; PERINEURAL
Status: DISPENSED
Start: 2023-08-04

## (undated) RX ORDER — BUPIVACAINE HYDROCHLORIDE 2.5 MG/ML
INJECTION, SOLUTION EPIDURAL; INFILTRATION; INTRACAUDAL
Status: DISPENSED
Start: 2021-06-17

## (undated) RX ORDER — LIDOCAINE HYDROCHLORIDE 10 MG/ML
INJECTION, SOLUTION EPIDURAL; INFILTRATION; INTRACAUDAL; PERINEURAL
Status: DISPENSED
Start: 2019-03-26

## (undated) RX ORDER — TRIAMCINOLONE ACETONIDE 40 MG/ML
INJECTION, SUSPENSION INTRA-ARTICULAR; INTRAMUSCULAR
Status: DISPENSED
Start: 2020-12-29

## (undated) RX ORDER — FENTANYL CITRATE 50 UG/ML
INJECTION, SOLUTION INTRAMUSCULAR; INTRAVENOUS
Status: DISPENSED
Start: 2023-09-01

## (undated) RX ORDER — FENTANYL CITRATE 50 UG/ML
INJECTION, SOLUTION INTRAMUSCULAR; INTRAVENOUS
Status: DISPENSED
Start: 2021-07-01

## (undated) RX ORDER — BUPIVACAINE HYDROCHLORIDE 2.5 MG/ML
INJECTION, SOLUTION EPIDURAL; INFILTRATION; INTRACAUDAL
Status: DISPENSED
Start: 2022-11-18

## (undated) RX ORDER — LIDOCAINE HYDROCHLORIDE 10 MG/ML
INJECTION, SOLUTION EPIDURAL; INFILTRATION; INTRACAUDAL; PERINEURAL
Status: DISPENSED
Start: 2018-04-17

## (undated) RX ORDER — LIDOCAINE HYDROCHLORIDE 10 MG/ML
INJECTION, SOLUTION EPIDURAL; INFILTRATION; INTRACAUDAL; PERINEURAL
Status: DISPENSED
Start: 2021-06-17

## (undated) RX ORDER — FENTANYL CITRATE 50 UG/ML
INJECTION, SOLUTION INTRAMUSCULAR; INTRAVENOUS
Status: DISPENSED
Start: 2023-12-27

## (undated) RX ORDER — ACETAMINOPHEN 325 MG/1
TABLET ORAL
Status: DISPENSED
Start: 2023-12-27

## (undated) RX ORDER — BUPIVACAINE HYDROCHLORIDE 2.5 MG/ML
INJECTION, SOLUTION EPIDURAL; INFILTRATION; INTRACAUDAL
Status: DISPENSED
Start: 2019-03-26

## (undated) RX ORDER — TRIAMCINOLONE ACETONIDE 40 MG/ML
INJECTION, SUSPENSION INTRA-ARTICULAR; INTRAMUSCULAR
Status: DISPENSED
Start: 2017-12-19

## (undated) RX ORDER — TRIAMCINOLONE ACETONIDE 40 MG/ML
INJECTION, SUSPENSION INTRA-ARTICULAR; INTRAMUSCULAR
Status: DISPENSED
Start: 2022-03-15

## (undated) RX ORDER — LIDOCAINE HYDROCHLORIDE 10 MG/ML
INJECTION, SOLUTION EPIDURAL; INFILTRATION; INTRACAUDAL; PERINEURAL
Status: DISPENSED
Start: 2022-03-15

## (undated) RX ORDER — TRIAMCINOLONE ACETONIDE 40 MG/ML
INJECTION, SUSPENSION INTRA-ARTICULAR; INTRAMUSCULAR
Status: DISPENSED
Start: 2019-03-26

## (undated) RX ORDER — CEFAZOLIN SODIUM/WATER 2 G/20 ML
SYRINGE (ML) INTRAVENOUS
Status: DISPENSED
Start: 2023-12-27

## (undated) RX ORDER — LIDOCAINE HYDROCHLORIDE 10 MG/ML
INJECTION, SOLUTION EPIDURAL; INFILTRATION; INTRACAUDAL; PERINEURAL
Status: DISPENSED
Start: 2023-03-31

## (undated) RX ORDER — BUPIVACAINE HYDROCHLORIDE 2.5 MG/ML
INJECTION, SOLUTION EPIDURAL; INFILTRATION; INTRACAUDAL
Status: DISPENSED
Start: 2023-08-04

## (undated) RX ORDER — BUPIVACAINE HYDROCHLORIDE 2.5 MG/ML
INJECTION, SOLUTION EPIDURAL; INFILTRATION; INTRACAUDAL
Status: DISPENSED
Start: 2022-03-15

## (undated) RX ORDER — LIDOCAINE HYDROCHLORIDE 10 MG/ML
INJECTION, SOLUTION EPIDURAL; INFILTRATION; INTRACAUDAL; PERINEURAL
Status: DISPENSED
Start: 2022-11-18

## (undated) RX ORDER — TRIAMCINOLONE ACETONIDE 40 MG/ML
INJECTION, SUSPENSION INTRA-ARTICULAR; INTRAMUSCULAR
Status: DISPENSED
Start: 2022-11-18

## (undated) RX ORDER — BUPIVACAINE HYDROCHLORIDE 2.5 MG/ML
INJECTION, SOLUTION EPIDURAL; INFILTRATION; INTRACAUDAL
Status: DISPENSED
Start: 2017-12-19

## (undated) RX ORDER — BUPIVACAINE HYDROCHLORIDE 2.5 MG/ML
INJECTION, SOLUTION EPIDURAL; INFILTRATION; INTRACAUDAL
Status: DISPENSED
Start: 2023-03-31

## (undated) RX ORDER — BUPIVACAINE HYDROCHLORIDE 2.5 MG/ML
INJECTION, SOLUTION EPIDURAL; INFILTRATION; INTRACAUDAL
Status: DISPENSED
Start: 2018-04-17

## (undated) RX ORDER — LIDOCAINE HYDROCHLORIDE 10 MG/ML
INJECTION, SOLUTION EPIDURAL; INFILTRATION; INTRACAUDAL; PERINEURAL
Status: DISPENSED
Start: 2017-12-19

## (undated) RX ORDER — TRIAMCINOLONE ACETONIDE 40 MG/ML
INJECTION, SUSPENSION INTRA-ARTICULAR; INTRAMUSCULAR
Status: DISPENSED
Start: 2023-03-31

## (undated) RX ORDER — LIDOCAINE HYDROCHLORIDE 10 MG/ML
INJECTION, SOLUTION EPIDURAL; INFILTRATION; INTRACAUDAL; PERINEURAL
Status: DISPENSED
Start: 2020-12-29

## (undated) RX ORDER — TRIAMCINOLONE ACETONIDE 40 MG/ML
INJECTION, SUSPENSION INTRA-ARTICULAR; INTRAMUSCULAR
Status: DISPENSED
Start: 2018-04-17

## (undated) RX ORDER — TRIAMCINOLONE ACETONIDE 40 MG/ML
INJECTION, SUSPENSION INTRA-ARTICULAR; INTRAMUSCULAR
Status: DISPENSED
Start: 2021-06-17

## (undated) RX ORDER — TRIAMCINOLONE ACETONIDE 40 MG/ML
INJECTION, SUSPENSION INTRA-ARTICULAR; INTRAMUSCULAR
Status: DISPENSED
Start: 2023-08-04